# Patient Record
Sex: FEMALE | Race: WHITE | NOT HISPANIC OR LATINO | Employment: UNEMPLOYED | URBAN - METROPOLITAN AREA
[De-identification: names, ages, dates, MRNs, and addresses within clinical notes are randomized per-mention and may not be internally consistent; named-entity substitution may affect disease eponyms.]

---

## 2017-03-10 ENCOUNTER — TELEPHONE (OUTPATIENT)
Dept: GASTROENTEROLOGY | Facility: CLINIC | Age: 82
End: 2017-03-10

## 2017-05-02 ENCOUNTER — OFFICE VISIT (OUTPATIENT)
Dept: INTERNAL MEDICINE | Facility: CLINIC | Age: 82
End: 2017-05-02
Payer: MEDICARE

## 2017-05-02 ENCOUNTER — LAB VISIT (OUTPATIENT)
Dept: LAB | Facility: HOSPITAL | Age: 82
End: 2017-05-02
Attending: INTERNAL MEDICINE
Payer: MEDICARE

## 2017-05-02 VITALS
BODY MASS INDEX: 19.13 KG/M2 | HEIGHT: 59 IN | SYSTOLIC BLOOD PRESSURE: 98 MMHG | RESPIRATION RATE: 18 BRPM | WEIGHT: 94.88 LBS | DIASTOLIC BLOOD PRESSURE: 60 MMHG | HEART RATE: 68 BPM

## 2017-05-02 VITALS
DIASTOLIC BLOOD PRESSURE: 60 MMHG | BODY MASS INDEX: 18.95 KG/M2 | OXYGEN SATURATION: 98 % | WEIGHT: 94 LBS | HEIGHT: 59 IN | HEART RATE: 66 BPM | SYSTOLIC BLOOD PRESSURE: 96 MMHG

## 2017-05-02 DIAGNOSIS — E78.5 HYPERLIPIDEMIA, UNSPECIFIED HYPERLIPIDEMIA TYPE: ICD-10-CM

## 2017-05-02 DIAGNOSIS — M81.0 OSTEOPOROSIS, POST-MENOPAUSAL: ICD-10-CM

## 2017-05-02 DIAGNOSIS — F17.200 SMOKER: ICD-10-CM

## 2017-05-02 DIAGNOSIS — K22.4 ESOPHAGEAL DYSMOTILITY: Primary | ICD-10-CM

## 2017-05-02 DIAGNOSIS — K21.9 GASTROESOPHAGEAL REFLUX DISEASE WITHOUT ESOPHAGITIS: ICD-10-CM

## 2017-05-02 DIAGNOSIS — K22.4 ESOPHAGEAL DYSMOTILITY: ICD-10-CM

## 2017-05-02 DIAGNOSIS — S22.000S THORACIC COMPRESSION FRACTURE, SEQUELA: ICD-10-CM

## 2017-05-02 DIAGNOSIS — K59.03 CONSTIPATION DUE TO PAIN MEDICATION: ICD-10-CM

## 2017-05-02 DIAGNOSIS — Z00.00 ENCOUNTER FOR PREVENTIVE HEALTH EXAMINATION: Primary | ICD-10-CM

## 2017-05-02 LAB
ALBUMIN SERPL BCP-MCNC: 3.5 G/DL
ALP SERPL-CCNC: 74 U/L
ALT SERPL W/O P-5'-P-CCNC: 8 U/L
ANION GAP SERPL CALC-SCNC: 11 MMOL/L
AST SERPL-CCNC: 22 U/L
BASOPHILS # BLD AUTO: 0.02 K/UL
BASOPHILS NFR BLD: 0.2 %
BILIRUB SERPL-MCNC: 0.4 MG/DL
BUN SERPL-MCNC: 15 MG/DL
CALCIUM SERPL-MCNC: 9.5 MG/DL
CHLORIDE SERPL-SCNC: 96 MMOL/L
CHOLEST/HDLC SERPL: 2.9 {RATIO}
CO2 SERPL-SCNC: 32 MMOL/L
CREAT SERPL-MCNC: 1 MG/DL
DIFFERENTIAL METHOD: ABNORMAL
EOSINOPHIL # BLD AUTO: 0.2 K/UL
EOSINOPHIL NFR BLD: 2.2 %
ERYTHROCYTE [DISTWIDTH] IN BLOOD BY AUTOMATED COUNT: 14 %
EST. GFR  (AFRICAN AMERICAN): 57.7 ML/MIN/1.73 M^2
EST. GFR  (NON AFRICAN AMERICAN): 50.1 ML/MIN/1.73 M^2
GLUCOSE SERPL-MCNC: 97 MG/DL
HCT VFR BLD AUTO: 44.8 %
HDL/CHOLESTEROL RATIO: 35 %
HDLC SERPL-MCNC: 143 MG/DL
HDLC SERPL-MCNC: 50 MG/DL
HGB BLD-MCNC: 14.3 G/DL
LDLC SERPL CALC-MCNC: 65.6 MG/DL
LYMPHOCYTES # BLD AUTO: 1.9 K/UL
LYMPHOCYTES NFR BLD: 21.8 %
MCH RBC QN AUTO: 31.1 PG
MCHC RBC AUTO-ENTMCNC: 31.9 %
MCV RBC AUTO: 97 FL
MONOCYTES # BLD AUTO: 0.8 K/UL
MONOCYTES NFR BLD: 9.1 %
NEUTROPHILS # BLD AUTO: 5.7 K/UL
NEUTROPHILS NFR BLD: 66.5 %
NONHDLC SERPL-MCNC: 93 MG/DL
PLATELET # BLD AUTO: 253 K/UL
PMV BLD AUTO: 10.1 FL
POTASSIUM SERPL-SCNC: 3.6 MMOL/L
PROT SERPL-MCNC: 7.1 G/DL
RBC # BLD AUTO: 4.6 M/UL
SODIUM SERPL-SCNC: 139 MMOL/L
TRIGL SERPL-MCNC: 137 MG/DL
TSH SERPL DL<=0.005 MIU/L-ACNC: 1.95 UIU/ML
WBC # BLD AUTO: 8.57 K/UL

## 2017-05-02 PROCEDURE — 99999 PR PBB SHADOW E&M-EST. PATIENT-LVL III: CPT | Mod: PBBFAC,,, | Performed by: NURSE PRACTITIONER

## 2017-05-02 PROCEDURE — 99213 OFFICE O/P EST LOW 20 MIN: CPT | Mod: 27,PBBFAC | Performed by: INTERNAL MEDICINE

## 2017-05-02 PROCEDURE — 99999 PR PBB SHADOW E&M-EST. PATIENT-LVL III: CPT | Mod: 27,PBBFAC,, | Performed by: INTERNAL MEDICINE

## 2017-05-02 PROCEDURE — G0438 PPPS, INITIAL VISIT: HCPCS | Mod: ,,, | Performed by: NURSE PRACTITIONER

## 2017-05-02 PROCEDURE — 99214 OFFICE O/P EST MOD 30 MIN: CPT | Mod: S$PBB,,, | Performed by: INTERNAL MEDICINE

## 2017-05-02 RX ORDER — SIMVASTATIN 40 MG/1
TABLET, FILM COATED ORAL
Qty: 90 TABLET | Refills: 3 | Status: SHIPPED | OUTPATIENT
Start: 2017-05-02 | End: 2017-12-03

## 2017-05-02 RX ORDER — SIMVASTATIN 40 MG/1
TABLET, FILM COATED ORAL
Qty: 90 TABLET | Refills: 0 | Status: SHIPPED | OUTPATIENT
Start: 2017-05-02 | End: 2017-05-02 | Stop reason: SDUPTHER

## 2017-05-02 NOTE — PROGRESS NOTES
Subjective:       Patient ID: Simona Bassett is a 89 y.o. female.    Chief Complaint: Follow-up (Dysphagia, unspecified type )    HPI   C/o ache R shoulder.  She sleeps on that side.  Also with bruise on back where spine hits chair.  She spends a lot of time in room.    She smokes in bed in her room.    H/o dysphagia.  She says it's fine, but daughter thinks otherwise.  GI note reviewed.  Pt is eating less.  Esophogram never done.  Eating a few bites only.  Takes nexim twice a week.    Still smokes, most often in bed!.    Hyperlipidemia.  Due for refill simvastatin.    Constipation worse, but not eating much and not taking lactulose.    She has h/o hypokalemia, but not taking K regularly.    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  Had had R retinal injections.        Review of Systems   Constitutional: Negative for fever and unexpected weight change.   HENT: Negative for congestion and postnasal drip.    Eyes: Negative for pain, discharge and visual disturbance.   Respiratory: Negative for cough, chest tightness, shortness of breath and wheezing.    Cardiovascular: Negative for chest pain and leg swelling.   Gastrointestinal: Positive for constipation. Negative for abdominal pain, diarrhea and nausea.   Genitourinary: Negative for difficulty urinating, dysuria and hematuria.   Skin: Positive for rash.   Neurological: Negative for headaches.   Psychiatric/Behavioral: Negative for dysphoric mood and sleep disturbance. The patient is not nervous/anxious.        Objective:      Physical Exam   Constitutional: She is oriented to person, place, and time. She appears well-developed and well-nourished.   Eyes: No scleral icterus.   Neck: No JVD present. No thyromegaly present.   Cardiovascular: Normal rate, regular rhythm and normal heart sounds.    Pulmonary/Chest: Effort normal and breath sounds normal. No respiratory distress. She has no wheezes. She has no rales.   Abdominal: Soft. She  exhibits no mass. There is no tenderness.   Musculoskeletal: She exhibits no edema.   Neurological: She is alert and oriented to person, place, and time.   Skin:   Erythema, without breakdown, at mid spine protrusion.  Few abraded areas R prox post humerus.   Psychiatric: She has a normal mood and affect. Her behavior is normal.       Results for orders placed or performed in visit on 05/02/17   CBC auto differential   Result Value Ref Range    WBC 8.57 3.90 - 12.70 K/uL    RBC 4.60 4.00 - 5.40 M/uL    Hemoglobin 14.3 12.0 - 16.0 g/dL    Hematocrit 44.8 37.0 - 48.5 %    MCV 97 82 - 98 fL    MCH 31.1 (H) 27.0 - 31.0 pg    MCHC 31.9 (L) 32.0 - 36.0 %    RDW 14.0 11.5 - 14.5 %    Platelets 253 150 - 350 K/uL    MPV 10.1 9.2 - 12.9 fL    Gran # 5.7 1.8 - 7.7 K/uL    Lymph # 1.9 1.0 - 4.8 K/uL    Mono # 0.8 0.3 - 1.0 K/uL    Eos # 0.2 0.0 - 0.5 K/uL    Baso # 0.02 0.00 - 0.20 K/uL    Gran% 66.5 38.0 - 73.0 %    Lymph% 21.8 18.0 - 48.0 %    Mono% 9.1 4.0 - 15.0 %    Eosinophil% 2.2 0.0 - 8.0 %    Basophil% 0.2 0.0 - 1.9 %    Differential Method Automated      Assessment:       1. Esophageal dysmotility    2. Gastroesophageal reflux disease without esophagitis    3. Hyperlipidemia, unspecified hyperlipidemia type    4. Osteoporosis, post-menopausal    5. Smoker    6. Constipation due to pain medication        Plan:       Simona was seen today for follow-up.    Diagnoses and all orders for this visit:    Esophageal dysmotility    Gastroesophageal reflux disease without esophagitis    Hyperlipidemia, unspecified hyperlipidemia type    Osteoporosis, post-menopausal    Smoker    Constipation due to pain medication    Other orders  -     simvastatin (ZOCOR) 40 MG tablet; TAKE ONE TABLET BY MOUTH ONCE DAILY IN THE EVENING           Will notify Dr Cruz - esophogram needs to be r/s [phone] is daughter's #    Change position regularly.  Warned against smoking in bed!.  Daughter aware of risks and doing her best to ensure pt's  safety.

## 2017-05-02 NOTE — PROGRESS NOTES
"Simona Bassett presented for an initial Medicare AWV today.  Her daughter is present for today's visit. The following components were reviewed and updated:    · Medical history  · Family History  · Social history  · Allergies and Current Medications  · Health Risk Assessment  · Health Maintenance  · Care Team    **See Completed Assessments for Annual Wellness visit with in the encounter summary    The following assessments were completed:  · Depression Screening  · Cognitive function Screening      · Timed Get Up Test  · Whisper Test    Vitals:    05/02/17 0949   BP: 98/60   BP Location: Right arm   Patient Position: Sitting   BP Method: Manual   Pulse: 68   Resp: 18   Weight: 43 kg (94 lb 14.4 oz)   Height: 4' 11" (1.499 m)     Body mass index is 19.17 kg/(m^2).  Waist Measurements: 36 in]        Diagnoses and health risks identified today and associated recommendations/orders:  1. Encounter for preventive health examination      2. Hyperlipidemia, unspecified hyperlipidemia type  Stable and controlled. Continue current treatment plan as previously prescribed by PCP.        3. Gastroesophageal reflux disease without esophagitis  Stable and controlled. Continue current treatment plan as previously prescribed by PCP.       4. Esophageal dysmotility  Stable and controlled. Continue current treatment plan as previously prescribed by PCP.       5. Osteoporosis, post-menopausal  Stable and controlled. Continue current treatment plan as previously prescribed by PCP.       6. Thoracic compression fracture, sequela  Stable and controlled. Continue current treatment plan as previously prescribed by PCP.       7. Smoker  Discussed with patient the importance of smoking cessation. Literature provided. Declines participation in the smoking cessation program.      Provided Simona with a 5-10 year written screening schedule and personal prevention plan. Recommendations were developed using the USPSTF age appropriate " recommendations. Education, counseling, and referrals were provided as needed.  After Visit Summary printed and given to patient which includes a list of additional screenings\tests needed.    Return in about 6 months (around 11/2/2017) for Follow up with PCP, SOONER IF NEEDED, Follow up Annual Wellness Visit in 1 year.      JULISA SuarezC

## 2017-05-02 NOTE — MR AVS SNAPSHOT
[facility]  [address]  [facility] [address]  Phone: [phone]  Fax: [phone]                  Simona Bassett   2017 10:00 AM   Office Visit    Description:  Female : 10/1/1927   Provider:  SUSAN RICHARDS   Department:  [facility]           Reason for Visit     Medicare AWV           Diagnoses this Visit        Comments    Encounter for preventive health examination    -  Primary            To Do List           Future Appointments        Provider Department Dept Phone    2017 11:00 AM Twila Loera MD [facility] [phone]      Goals (5 Years of Data)     None      Follow-Up and Disposition     Return in about 6 months (around 2017) for Follow up with PCP, SOONER IF NEEDED, Follow up Annual Wellness Visit in 1 year.      [facility]     [facility] On Call Nurse Care Line -  Assistance  Unless otherwise directed by your provider, please contact [facility], our nurse care line that is available for  assistance.     Registered nurses in the [facility] On Call Center provide: appointment scheduling, clinical advisement, health education, and other advisory services.  Call: [phone] (toll free)               Medications           Message regarding Medications     Verify the changes and/or additions to your medication regime listed below are the same as discussed with your clinician today.  If any of these changes or additions are incorrect, please notify your healthcare provider.             Verify that the below list of medications is an accurate representation of the medications you are currently taking.  If none reported, the list may be blank. If incorrect, please contact your healthcare provider. Carry this list with you in case of emergency.           Current Medications     aspirin 81 mg Tab Every day    CALCIUM CARBONATE/VITAMIN D3 (CALCIUM 500 + D ORAL) Twice a day    esomeprazole (NEXIUM) 20 MG  "capsule Take 1 capsule (20 mg total) by mouth before breakfast.    mupirocin (BACTROBAN) 2 % ointment 1 Tube. AAA an needed    nitroGLYCERIN (NITROSTAT) 0.4 MG SL tablet Place 1 tablet (0.4 mg total) under the tongue every 5 (five) minutes as needed for Chest pain. Every day PRN    polymyxin B sulf-trimethoprim (POLYTRIM) 10,000 unit- 1 mg/mL Drop Place 1 drop into the left eye 3 (three) times daily.     potassium chloride SA (K-DUR,KLOR-CON) 20 MEQ tablet 1 tab po q day    simvastatin (ZOCOR) 40 MG tablet TAKE ONE TABLET BY MOUTH ONCE DAILY IN THE EVENING    tramadol (ULTRAM) 50 mg tablet Take 1 tablet (50 mg total) by mouth once daily.           Clinical Reference Information           Your Vitals Were     BP Pulse Resp Height Weight BMI    98/60 (BP Location: Right arm, Patient Position: Sitting, BP Method: Manual) 68 18 4' 11" (1.499 m) 43 kg (94 lb 14.4 oz) 19.17 kg/m2      Blood Pressure          Most Recent Value    BP  98/60      Allergies as of 5/2/2017     No Known Allergies      Immunizations Administered on Date of Encounter - 5/2/2017     None      [facility] Sign-Up     Activating your [facility] account is as easy as 1-2-3!     1) Visit [email or web address], select Sign Up Now, enter this activation code and your date of birth, then select Next.  [email or web address]  Expires: 6/16/2017 10:21 AM      2) Create a username and password to use when you visit [facility] in the future and select a security question in case you lose your password and select Next.    3) Enter your e-mail address and click Sign Up!    Additional Information  If you have questions, please e-mail [email or web address] or call [phone] to talk to our [facility] staff. Remember, [facility] is NOT to be used for urgent needs. For medical emergencies, dial 911.         Instructions      Counseling and Referral of Other Preventative  (Italic type indicates deductible and co-insurance are waived)    Patient Name: Simona Bassett  Today's Date: " 5/2/2017      SERVICE LIMITATIONS RECOMMENDATION    Vaccines    · Pneumococcal (once after 65)    · Influenza (annually)    · Hepatitis B (if medium/high risk)    · Prevnar 13  · TDAP      Hepatitis B medium/high risk factors:       - End-stage renal disease       - Hemophiliacs who received Factor VII or         IX concentrates       - Clients of institutions for the mentally             retarded       - Persons who live in the same house as          a HepB carrier       - Homosexual men       - Illicit injectable drug abusers     Pneumococcal: Done, no repeat necessary   11/02/2016     Influenza: Done, repeat in one year   11/02/2016     Hepatitis B: N/A     Prevnar 13: Done, no repeat necessary   05/07/2015  TDAP:03/12/2015    Mammogram (biennial age 50-74)  Annually (age 40 or over)  N/A    Pap (up to age 70 and after 70 if unknown history or abnormal study last 10 years)    N/A     The USPSTF recommends against screening for cervical cancer in women older than age 65 years who have had adequate prior screening and are not otherwise at high risk for cervical cancer.      Colorectal cancer screening (to age 75)    · Fecal occult blood test (annual)  · Flexible sigmoidoscopy (5y)  · Screening colonoscopy (10y)  · Barium enema   N/A     LAST DONE 06/05/2013; REPEAT AS CLINICALLY INDICATED     Diabetes self-management training (no USPSTF recommendations)  Requires referral by treating physician for patient with diabetes or renal disease. 10 hours of initial DSMT sessions of no less than 30 minutes each in a continuous 12-month period. 2 hours of follow-up DSMT in subsequent years.  N/A    Bone mass measurements (age 65 & older, biennial)  Requires diagnosis related to osteoporosis or estrogen deficiency. Biennial benefit unless patient has history of long-term glucocorticoid  Last done 07/08/2016, recommend to repeat every 2 years    Glaucoma screening (no USPSTF recommendation)  Diabetes mellitus, family history    , age 50 or over    American, age 65 or over  Last done 04/07/2017, recommend to repeat every 1  years    Medical nutrition therapy for diabetes or renal disease (no recommended schedule)  Requires referral by treating physician for patient with diabetes or renal disease or kidney transplant within the past 3 years.  Can be provided in same year as diabetes self-management training (DSMT), and CMS recommends medical nutrition therapy take place after DSMT. Up to 3 hours for initial year and 2 hours in subsequent years.  N/A    Cardiovascular screening blood tests (every 5 years)  · Fasting lipid panel  Order as a panel if possible  Last done 05/02/2017, recommend to repeat every 5  years    Diabetes screening tests (at least every 3 years, Medicare covers annually or at 6-month intervals for prediabetic patients)  · Fasting blood sugar (FBS) or glucose tolerance test (GTT)  Patient must be diagnosed with one of the following:       - Hypertension       - Dyslipidemia       - Obesity (BMI 30kg/m2)       - Previous elevated impaired FBS or GTT       ... or any two of the following:       - Overweight (BMI 25 but <30)       - Family history of diabetes       - Age 65 or older       - History of gestational diabetes or birth of baby weighing more than 9 pounds  Last done 05/02/2017, recommend to repeat every 3  years    Abdominal aortic aneurysm screening (once)  · Sonogram   Limited to patients who meet one of the following criteria:       - Men who are 65-75 years old and have smoked more than 100 cigarette in their lifetime       - Anyone with a family history of abdominal aortic aneurysm       - Anyone recommended for screening by the USPSTF  N/A    HIV screening (annually for increased risk patients)  · HIV-1 and HIV-2 by EIA, or JENNIFER, rapid antibody test or oral mucosa transudate  Patients must be at increased risk for HIV infection per USPSTF guidelines or pregnant. Tests covered annually  for patient at increased risk or as requested by the patient. Pregnant patients may receive up to 3 tests during pregnancy.  Risks discussed, screening is not recommended    Smoking cessation counseling (up to 8 sessions per year)  Patients must be asymptomatic of tobacco-related conditions to receive as a preventative service.  Declines    Subsequent annual wellness visit  At least 12 months since last AWV  Return in one year     The following information is provided to all patients.  This information is to help you find resources for any of the problems found today that may be affecting your health:                Living healthy guide: www.[facility].[place].[facility]      Understanding Diabetes: www.diabetes.org      Eating healthy: www.cdc.gov/healthyweight      [facility] home safety checklist: www.cdc.gov/steadi/patient.html      Agency on Aging: www.goea.[place].[facility]      Alcoholics anonymous (AA): www.aa.org      Physical Activity: www.marly.nih.gov/mg3tmhg      Tobacco use: www.quitwithusla.org     How to Quit Smoking  Smoking is one of the hardest habits to break. About half of all people who have ever smoked have been able to quit. Most people who still smoke want to quit. Here are some of the best ways to stop smoking.    Keep trying  It takes most smokers about eight tries before they can quit entirely. Its important not to give up.  Go cold turkey  Most former smokers quit cold turkey (all at once). Trying to cut back gradually doesn't seem to work as well, perhaps because it continues the smoking habit. Also, it is possible to inhale more while smoking fewer cigarettes. This results in the same amount of nicotine in your body!  Get support  Support programs can be a big help, especially for heavy smokers. These groups offer lectures, ways to change behavior, and peer support. Here are some ways to find a support program:  · Free national quitline: 800-QUIT-NOW (216-029-7008).  · Hospital quit-smoking  "programs.  · American Lung Association: (607.579.4862).  · American Cancer Society (247-968-9036).  Support at home is important too. Nonsmokers can offer praise and encouragement. If the smoker in your life finds it hard to quit, encourage them to keep trying!  Over-the-counter medicines  Nicotine replacement therapy may make quitting easier. Certain aids, such as the nicotine patch, gum, and lozenges, are available without a prescription. It is best to use these under a doctors care, though. The skin patch provides a steady supply of nicotine. Nicotine gum and lozenges give temporary bursts of low levels of nicotine. Both methods reduce the craving for cigarettes. Warning: If you have nausea, vomiting, dizziness, weakness, or a fast heartbeat, stop using these products and see your doctor.  Prescription medicines  After reviewing your smoking patterns and prior attempts to quit, your doctor may offer a prescription medicine such as bupropion, varenicline, a nicotine inhaler, or nasal spray. Each has advantages and side effects. Your doctor can review these with you.  Health benefits of quitting  The benefits of quitting start right away and keep improving the longer you go without smoking. These benefits occur at any age.  So whether you are 17 or 70, quitting is a good decision. Some of the benefits include:  · 20 minutes: Blood pressure and pulse return to normal.  · 8 hours: Oxygen levels return to normal.  · 2 days: Ability to smell and taste begin to improve as damaged nerves regrow.  · 2 to 3 weeks: Circulation and lung function improve.  · 1 to 9 months: Coughing, congestion, and shortness of breath decrease; tiredness decreases.  · 1 year: Risk of heart attack decreases by half.  · 5 years: Risk of lung cancer decreases by half; risk of stroke becomes the same as a nonsmokers.  For more on how to quit smoking, try these online resources:   · Smokefree.gov  · "Clearing the Air" booklet from the National " Cancer [place]: smokefree.gov/sites/default/files/pdf/clearing-the-air-accessible.pdf  Date Last Reviewed: 4/28/2015 © 2000-2016 [employer] [address] [place] [address], [place] [address]. All rights reserved. This information is not intended as a substitute for professional medical care. Always follow your healthcare professional's instructions.             Smoking Cessation     If you would like to quit smoking:   You may be eligible for free services if you are a [place] resident and started smoking cigarettes before September 1, 1988.  Call the Smoking Cessation Trust (SCT) toll free at (682) 482-4703 or (027) 719-0458.   Call 0-800-QUIT-NOW if you do not meet the above criteria.   Contact us via email: [email or web address]   View our website for more information: [email or web address]        Language Assistance Services     ATTENTION: Language assistance services are available, free of charge. Please call [phone].      ATENCIÓN: Si elliotla madina, tiene a cardenas disposición servicios gratuitos de asistencia lingüística. Llame al [phone].     CHÚ Ý: N?u b?n nói Ti?ng Vi?t, có các d?ch v? h? tr? ngôn ng? mi?n phí dành cho b?n. G?i s? [phone].         [facility] complies with applicable Federal civil rights laws and does not discriminate on the basis of race, color, national origin, age, disability, or sex.

## 2017-05-02 NOTE — Clinical Note
Nati - daughter is ready to schedule esophogram if you so desire.  Her # (Brandi) - [phone]  thanks  Twila

## 2017-05-02 NOTE — PATIENT INSTRUCTIONS
Counseling and Referral of Other Preventative  (Italic type indicates deductible and co-insurance are waived)    Patient Name: Simona Bassett  Today's Date: 5/2/2017      SERVICE LIMITATIONS RECOMMENDATION    Vaccines    · Pneumococcal (once after 65)    · Influenza (annually)    · Hepatitis B (if medium/high risk)    · Prevnar 13  · TDAP      Hepatitis B medium/high risk factors:       - End-stage renal disease       - Hemophiliacs who received Factor VII or         IX concentrates       - Clients of institutions for the mentally             retarded       - Persons who live in the same house as          a HepB carrier       - Homosexual men       - Illicit injectable drug abusers     Pneumococcal: Done, no repeat necessary   11/02/2016     Influenza: Done, repeat in one year   11/02/2016     Hepatitis B: N/A     Prevnar 13: Done, no repeat necessary   05/07/2015  TDAP:03/12/2015    Mammogram (biennial age 50-74)  Annually (age 40 or over)  N/A    Pap (up to age 70 and after 70 if unknown history or abnormal study last 10 years)    N/A     The USPSTF recommends against screening for cervical cancer in women older than age 65 years who have had adequate prior screening and are not otherwise at high risk for cervical cancer.      Colorectal cancer screening (to age 75)    · Fecal occult blood test (annual)  · Flexible sigmoidoscopy (5y)  · Screening colonoscopy (10y)  · Barium enema   N/A     LAST DONE 06/05/2013; REPEAT AS CLINICALLY INDICATED     Diabetes self-management training (no USPSTF recommendations)  Requires referral by treating physician for patient with diabetes or renal disease. 10 hours of initial DSMT sessions of no less than 30 minutes each in a continuous 12-month period. 2 hours of follow-up DSMT in subsequent years.  N/A    Bone mass measurements (age 65 & older, biennial)  Requires diagnosis related to osteoporosis or estrogen deficiency. Biennial benefit unless patient has history of  long-term glucocorticoid  Last done 07/08/2016, recommend to repeat every 2 years    Glaucoma screening (no USPSTF recommendation)  Diabetes mellitus, family history   , age 50 or over    American, age 65 or over  Last done 04/07/2017, recommend to repeat every 1  years    Medical nutrition therapy for diabetes or renal disease (no recommended schedule)  Requires referral by treating physician for patient with diabetes or renal disease or kidney transplant within the past 3 years.  Can be provided in same year as diabetes self-management training (DSMT), and CMS recommends medical nutrition therapy take place after DSMT. Up to 3 hours for initial year and 2 hours in subsequent years.  N/A    Cardiovascular screening blood tests (every 5 years)  · Fasting lipid panel  Order as a panel if possible  Last done 05/02/2017, recommend to repeat every 5  years    Diabetes screening tests (at least every 3 years, Medicare covers annually or at 6-month intervals for prediabetic patients)  · Fasting blood sugar (FBS) or glucose tolerance test (GTT)  Patient must be diagnosed with one of the following:       - Hypertension       - Dyslipidemia       - Obesity (BMI 30kg/m2)       - Previous elevated impaired FBS or GTT       ... or any two of the following:       - Overweight (BMI 25 but <30)       - Family history of diabetes       - Age 65 or older       - History of gestational diabetes or birth of baby weighing more than 9 pounds  Last done 05/02/2017, recommend to repeat every 3  years    Abdominal aortic aneurysm screening (once)  · Sonogram   Limited to patients who meet one of the following criteria:       - Men who are 65-75 years old and have smoked more than 100 cigarette in their lifetime       - Anyone with a family history of abdominal aortic aneurysm       - Anyone recommended for screening by the USPSTF  N/A    HIV screening (annually for increased risk patients)  · HIV-1 and HIV-2 by EIA,  or JENNIFER, rapid antibody test or oral mucosa transudate  Patients must be at increased risk for HIV infection per USPSTF guidelines or pregnant. Tests covered annually for patient at increased risk or as requested by the patient. Pregnant patients may receive up to 3 tests during pregnancy.  Risks discussed, screening is not recommended    Smoking cessation counseling (up to 8 sessions per year)  Patients must be asymptomatic of tobacco-related conditions to receive as a preventative service.  Declines    Subsequent annual wellness visit  At least 12 months since last AWV  Return in one year     The following information is provided to all patients.  This information is to help you find resources for any of the problems found today that may be affecting your health:                Living healthy guide: www.[facility].[place].[facility]      Understanding Diabetes: www.diabetes.org      Eating healthy: www.cdc.gov/healthyweight      [facility] home safety checklist: www.cdc.gov/steadi/patient.html      Agency on Aging: www.goea.[place].[facility]      Alcoholics anonymous (AA): www.aa.org      Physical Activity: www.marly.nih.gov/iu1pwwe      Tobacco use: www.quitwithusla.org     How to Quit Smoking  Smoking is one of the hardest habits to break. About half of all people who have ever smoked have been able to quit. Most people who still smoke want to quit. Here are some of the best ways to stop smoking.    Keep trying  It takes most smokers about eight tries before they can quit entirely. Its important not to give up.  Go cold turkey  Most former smokers quit cold turkey (all at once). Trying to cut back gradually doesn't seem to work as well, perhaps because it continues the smoking habit. Also, it is possible to inhale more while smoking fewer cigarettes. This results in the same amount of nicotine in your body!  Get support  Support programs can be a big help, especially for heavy smokers. These groups offer lectures, ways to change  behavior, and peer support. Here are some ways to find a support program:  · Free national quitline: 800-QUIT-NOW (226-122-5074).  · Hospital quit-smoking programs.  · American Lung Association: (939.405.5663).  · American Cancer Society (848-759-2454).  Support at home is important too. Nonsmokers can offer praise and encouragement. If the smoker in your life finds it hard to quit, encourage them to keep trying!  Over-the-counter medicines  Nicotine replacement therapy may make quitting easier. Certain aids, such as the nicotine patch, gum, and lozenges, are available without a prescription. It is best to use these under a doctors care, though. The skin patch provides a steady supply of nicotine. Nicotine gum and lozenges give temporary bursts of low levels of nicotine. Both methods reduce the craving for cigarettes. Warning: If you have nausea, vomiting, dizziness, weakness, or a fast heartbeat, stop using these products and see your doctor.  Prescription medicines  After reviewing your smoking patterns and prior attempts to quit, your doctor may offer a prescription medicine such as bupropion, varenicline, a nicotine inhaler, or nasal spray. Each has advantages and side effects. Your doctor can review these with you.  Health benefits of quitting  The benefits of quitting start right away and keep improving the longer you go without smoking. These benefits occur at any age.  So whether you are 17 or 70, quitting is a good decision. Some of the benefits include:  · 20 minutes: Blood pressure and pulse return to normal.  · 8 hours: Oxygen levels return to normal.  · 2 days: Ability to smell and taste begin to improve as damaged nerves regrow.  · 2 to 3 weeks: Circulation and lung function improve.  · 1 to 9 months: Coughing, congestion, and shortness of breath decrease; tiredness decreases.  · 1 year: Risk of heart attack decreases by half.  · 5 years: Risk of lung cancer decreases by half; risk of stroke becomes  "the same as a nonsmokers.  For more on how to quit smoking, try these online resources:   · Smokefree.gov  · "Clearing the Air" booklet from the National Cancer [employer]: smokefree.gov/sites/default/files/pdf/clearing-the-air-accessible.pdf  Date Last Reviewed: 4/28/2015 © 2000-2016 [employer]. [address] [place] [address]. All rights reserved. This information is not intended as a substitute for professional medical care. Always follow your healthcare professional's instructions.        "

## 2017-05-03 ENCOUNTER — TELEPHONE (OUTPATIENT)
Dept: GASTROENTEROLOGY | Facility: CLINIC | Age: 82
End: 2017-05-03

## 2017-05-03 NOTE — TELEPHONE ENCOUNTER
Per Dr. Loera daughter is ready to schedule esophogram.  Her # (Brandi) - [phone]      Pls contact pt and daughter and confirm that pt desires to proceed w esophagogram. pls arrange if pt agrees

## 2017-05-06 ENCOUNTER — TELEPHONE (OUTPATIENT)
Dept: GASTROENTEROLOGY | Facility: CLINIC | Age: 82
End: 2017-05-06

## 2017-05-06 DIAGNOSIS — R13.10 DYSPHAGIA, UNSPECIFIED TYPE: Primary | ICD-10-CM

## 2017-05-07 NOTE — TELEPHONE ENCOUNTER
Per Dr. Loera daughter is ready to schedule esophogram.  Her # (Brandi) - [phone]      Pls contact pt and daughter and confirm that pt desires to proceed w esophagogram. pls arrange if pt agrees

## 2017-05-08 NOTE — TELEPHONE ENCOUNTER
Spoke to Ms. Paz.  Esophagram scheduled.  Instructions and directions given.    She verbalizes understanding and appt slip mailed.

## 2017-05-18 ENCOUNTER — TELEPHONE (OUTPATIENT)
Dept: RADIOLOGY | Facility: HOSPITAL | Age: 82
End: 2017-05-18

## 2017-05-19 ENCOUNTER — HOSPITAL ENCOUNTER (OUTPATIENT)
Dept: RADIOLOGY | Facility: HOSPITAL | Age: 82
Discharge: HOME | End: 2017-05-19
Attending: INTERNAL MEDICINE
Payer: MEDICARE

## 2017-05-19 DIAGNOSIS — R13.10 DYSPHAGIA, UNSPECIFIED TYPE: ICD-10-CM

## 2017-05-19 PROCEDURE — 74220 X-RAY XM ESOPHAGUS 1CNTRST: CPT | Mod: TC

## 2017-05-19 PROCEDURE — 74220 X-RAY XM ESOPHAGUS 1CNTRST: CPT | Mod: 26,GC,, | Performed by: RADIOLOGY

## 2017-05-25 ENCOUNTER — TELEPHONE (OUTPATIENT)
Dept: GASTROENTEROLOGY | Facility: CLINIC | Age: 82
End: 2017-05-25

## 2017-05-25 NOTE — TELEPHONE ENCOUNTER
----- Message from Nati Cruz MD sent at 5/19/2017 11:25 PM CDT -----  Please let patient's daughter Gricelda Shanon Kelly at [phone]  know that esophagogram shows moderate dysmotility w tertiary contractions but no other significant abnormalities.  I released the result via [facility]. Unlikely that EGD w dilation would make much of a difference     The esophagus is normal in contour and caliber without evidence of masses or strictures. There are moderate tertiary contractions. No gastroesophageal reflux occurred during this exam.  No hiatal hernia demonstrated.  The gastric cardia is unremarkable in appearance.

## 2017-06-19 RX ORDER — TRAMADOL HYDROCHLORIDE 50 MG/1
50 TABLET ORAL DAILY
Qty: 90 TABLET | Refills: 0 | Status: ON HOLD | OUTPATIENT
Start: 2017-06-19 | End: 2017-10-30

## 2017-06-19 NOTE — TELEPHONE ENCOUNTER
----- Message from José Miguel Rutledge sent at 6/19/2017 12:31 PM CDT -----  Contact: Brandi/ [phone] cell  Type: Rx    Name of medication(s): tramadol (ULTRAM) 50 mg tablet    Is this a refill? New rx? refill    Who prescribed medication? Dr. Loera    Pharmacy Name, Phone, & Location: [facility] on file    Comments: Pt's daughter would like to request a refill on the medication above.  Please call and advise.    Thank you

## 2017-09-19 ENCOUNTER — TELEPHONE (OUTPATIENT)
Dept: INTERNAL MEDICINE | Facility: CLINIC | Age: 82
End: 2017-09-19

## 2017-09-19 NOTE — TELEPHONE ENCOUNTER
----- Message from Deandra Link sent at 9/18/2017  1:19 PM CDT -----  Contact: DaughterBrandi  Mobile: [phone]   Requesting a form for renewal for handicap sticker. Please mail it.    [address] [place] [address] JANET HERNANDEZ [id number]

## 2017-10-09 ENCOUNTER — TELEPHONE (OUTPATIENT)
Dept: INTERNAL MEDICINE | Facility: CLINIC | Age: 82
End: 2017-10-09

## 2017-10-09 DIAGNOSIS — S92.909S CLOSED FRACTURE OF FOOT, UNSPECIFIED LATERALITY, SEQUELA: Primary | ICD-10-CM

## 2017-10-09 NOTE — TELEPHONE ENCOUNTER
----- Message from Laura Carmona sent at 10/9/2017 10:03 AM CDT -----  Contact: pt [phone]  Patient would like to get a referral.  Does the patient already have the specialty clinic appointment scheduled:  no  If yes, what date is the appointment scheduled:   n/a  Referral to what specialty:  orthopedic  Reason (be specific):  Fracture in left foot  Does the patient want the referral with a specific physician:  no  Is this an [facility] or [facility] physician:  no  Comments:

## 2017-10-13 ENCOUNTER — OFFICE VISIT (OUTPATIENT)
Dept: ORTHOPEDICS | Facility: CLINIC | Age: 82
End: 2017-10-13
Payer: MEDICARE

## 2017-10-13 ENCOUNTER — HOSPITAL ENCOUNTER (OUTPATIENT)
Dept: RADIOLOGY | Facility: HOSPITAL | Age: 82
Discharge: HOME | End: 2017-10-13
Attending: ORTHOPAEDIC SURGERY
Payer: MEDICARE

## 2017-10-13 VITALS — BODY MASS INDEX: 18.95 KG/M2 | WEIGHT: 94 LBS | HEIGHT: 59 IN

## 2017-10-13 DIAGNOSIS — M79.672 LEFT FOOT PAIN: ICD-10-CM

## 2017-10-13 DIAGNOSIS — S92.355A CLOSED NONDISPLACED FRACTURE OF FIFTH METATARSAL BONE OF LEFT FOOT, INITIAL ENCOUNTER: ICD-10-CM

## 2017-10-13 DIAGNOSIS — M79.672 LEFT FOOT PAIN: Primary | ICD-10-CM

## 2017-10-13 PROCEDURE — 99203 OFFICE O/P NEW LOW 30 MIN: CPT | Mod: S$PBB,,, | Performed by: ORTHOPAEDIC SURGERY

## 2017-10-13 PROCEDURE — 99212 OFFICE O/P EST SF 10 MIN: CPT | Mod: PBBFAC,25 | Performed by: ORTHOPAEDIC SURGERY

## 2017-10-13 PROCEDURE — 99999 PR PBB SHADOW E&M-EST. PATIENT-LVL II: CPT | Mod: PBBFAC,,, | Performed by: ORTHOPAEDIC SURGERY

## 2017-10-13 PROCEDURE — 73630 X-RAY EXAM OF FOOT: CPT | Mod: TC,LT

## 2017-10-13 PROCEDURE — 73630 X-RAY EXAM OF FOOT: CPT | Mod: 26,LT,, | Performed by: RADIOLOGY

## 2017-10-13 RX ORDER — LACTULOSE 10 G/15ML
SOLUTION ORAL; RECTAL
Status: ON HOLD | COMMUNITY
Start: 2017-10-09 | End: 2017-10-30 | Stop reason: HOSPADM

## 2017-10-13 NOTE — LETTER
October 13, 2017      Twila Loera MD  [address]  [facility] [address]           [facility]  [address]  [facility] [address]  Phone: [phone]          Patient: Simona Bassett   MR Number: [id number]   YOB: 1927   Date of Visit: 10/13/2017       Dear Dr. Twila Loera:    Thank you for referring Simona Bassett to me for evaluation. Attached you will find relevant portions of my assessment and plan of care.    If you have questions, please do not hesitate to call me. I look forward to following Simona Bassett along with you.    Sincerely,    Brijesh Treviño MD    Enclosure  CC:  No Recipients    If you would like to receive this communication electronically, please contact [email or web address] or [phone] to request more information on [employer] [facility] access.    For providers and/or their staff who would like to refer a patient to [facility], please contact us through our one-stop-shop provider referral line, [facility], at [phone].    If you feel you have received this communication in error or would no longer like to receive these types of communications, please e-mail [email or web address]

## 2017-10-13 NOTE — PROGRESS NOTES
CC: left foot fracture    HPI: Simona Bassett is a 89 y.o. female who fell on 10/8/17 when she got up in the middle of the night and sustained a injury to her left foot. The pain is sharp in nature increased with any activity and improved with immobilization. She was seen at an urgent care which told her she had a mild stress frx. She denies and pain anywhere else, fever, or chills.       PAST MEDICAL HISTORY:   Past Medical History:   Diagnosis Date    Arthritis     Cataract     Dysphagia, unspecified(787.20)     Glaucoma     Hyperlipidemia     MGUS (monoclonal gammopathy of unknown significance)     Osteoporosis      PAST SURGICAL HISTORY:   Past Surgical History:   Procedure Laterality Date    BACK SURGERY      CHOLECYSTECTOMY      EYE SURGERY Bilateral     CATARACT SURGERY    GALLBLADDER SURGERY      SPINE SURGERY       FAMILY HISTORY:   Family History   Problem Relation Age of Onset    Stroke Mother     Heart disease Father     Cancer Sister     Cancer Brother      ? prostate    No Known Problems Daughter     No Known Problems Son     No Known Problems Son     No Known Problems Son     No Known Problems Son     No Known Problems Daughter     Colon cancer Neg Hx     Cystic fibrosis Neg Hx     Esophageal cancer Neg Hx     Hemochromatosis Neg Hx     Inflammatory bowel disease Neg Hx     Liver cancer Neg Hx     Liver disease Neg Hx     Rectal cancer Neg Hx     Stomach cancer Neg Hx     Derrick's disease Neg Hx     Celiac disease Neg Hx      SOCIAL HISTORY:   Social History     Social History    Marital status:      Spouse name: N/A    Number of children: 6    Years of education: N/A     Occupational History    Not on file.     Social History Main Topics    Smoking status: Current Every Day Smoker     Packs/day: 1.00     Years: 60.00    Smokeless tobacco: Never Used    Alcohol use No    Drug use: No    Sexual activity: Not on file     Other Topics Concern    Not on  "file     Social History Narrative    No narrative on file       MEDICATIONS:   Current Outpatient Prescriptions:     aspirin 81 mg Tab, Every day, Disp: , Rfl:     CALCIUM CARBONATE/VITAMIN D3 (CALCIUM 500 + D ORAL), Twice a day, Disp: , Rfl:     lactulose (CHRONULAC) 10 gram/15 mL solution, as needed. , Disp: , Rfl:     mupirocin (BACTROBAN) 2 % ointment, 1 Tube. AAA an needed, Disp: , Rfl:     nitroGLYCERIN (NITROSTAT) 0.4 MG SL tablet, Place 1 tablet (0.4 mg total) under the tongue every 5 (five) minutes as needed for Chest pain. Every day PRN, Disp: 100 tablet, Rfl: 5    polymyxin B sulf-trimethoprim (POLYTRIM) 10,000 unit- 1 mg/mL Drop, Place 1 drop into the left eye 3 (three) times daily. , Disp: , Rfl:     simvastatin (ZOCOR) 40 MG tablet, TAKE ONE TABLET BY MOUTH ONCE DAILY IN THE EVENING, Disp: 90 tablet, Rfl: 3    tramadol (ULTRAM) 50 mg tablet, Take 1 tablet (50 mg total) by mouth once daily., Disp: 90 tablet, Rfl: 0    VIT A/VIT C/VIT E/ZINC/COPPER (PRESERVISION AREDS ORAL), Take by mouth., Disp: , Rfl:     esomeprazole (NEXIUM) 20 MG capsule, Take 1 capsule (20 mg total) by mouth before breakfast. (Patient taking differently: Take 20 mg by mouth before breakfast. Daily as needed), Disp: 90 capsule, Rfl: 3    potassium chloride SA (K-DUR,KLOR-CON) 20 MEQ tablet, 1 tab po q day, Disp: 90 tablet, Rfl: 3  ALLERGIES: Review of patient's allergies indicates:  No Known Allergies    VITAL SIGNS: Ht 4' 11" (1.499 m)      Review of Systems   Constitution: Negative. Negative for chills, fever and night sweats.   HENT: Negative for congestion and headaches.    Eyes: Negative for blurred vision, left vision loss and right vision loss.   Cardiovascular: Negative for chest pain and syncope.   Respiratory: Negative for cough and shortness of breath.     Hematologic/Lymphatic: Negative for bleeding problem. Does not bruise/bleed easily.   Skin: Negative for dry skin, itching and rash.   Musculoskeletal: " Negative for falls and muscle weakness.   Neurological: Negative for disturbances in coordination, loss of balance and seizures.    Allergic/Immunologic: Negative for hives and persistent infections.       Physical Exam   Constitutional: Oriented to person, place, and time. Appears well-developed and well-nourished.   Head: Normocephalic and atraumatic.   Nose: Nose normal.   Eyes: No scleral icterus.   Neck: Normal range of motion. Neck supple.   Cardiovascular: Normal rate, rythm  Pulm: Breath sounds clear. No audible wheezing   Pulses:DP are 2+ on the right side, and 2+ on the left side.   Neurological: Alert and oriented to person, place, and time.   Skin: Skin is warm.   Psychiatric: Normal mood and affect.   Musc: Some ecchymosis and edema over the lateral aspect of the left foot with TTP over the 5th metatarsal. Otherwise NVI.    Imaging:  Xray of the left foot showing a non displaced 5th metatarsal frx    Assessment/Plan  Simona Bassett is a 89 y.o. female presents with a nondisplaced 5th metatarsal fracture. We will trx with a hard sole shoe and WBAT. Fu in 6 weeks.      I have personally taken the history and examined this patient and agree with the residents note as stated above.

## 2017-10-25 ENCOUNTER — ANESTHESIA EVENT (OUTPATIENT)
Dept: SURGERY | Facility: HOSPITAL | Age: 82
DRG: 481 | End: 2017-10-25
Payer: MEDICARE

## 2017-10-25 ENCOUNTER — HOSPITAL ENCOUNTER (INPATIENT)
Facility: HOSPITAL | Age: 82
LOS: 5 days | Discharge: NURSING, REHAB OR LONG-TERM CARE | DRG: 481 | End: 2017-10-30
Attending: EMERGENCY MEDICINE | Admitting: HOSPITALIST
Payer: MEDICARE

## 2017-10-25 DIAGNOSIS — S72.142D CLOSED DISPLACED INTERTROCHANTERIC FRACTURE OF LEFT FEMUR WITH ROUTINE HEALING: Primary | ICD-10-CM

## 2017-10-25 DIAGNOSIS — E83.39 HYPOPHOSPHATEMIA: ICD-10-CM

## 2017-10-25 DIAGNOSIS — K21.9 GASTROESOPHAGEAL REFLUX DISEASE WITHOUT ESOPHAGITIS: ICD-10-CM

## 2017-10-25 DIAGNOSIS — S72.142D CLOSED DISPLACED INTERTROCHANTERIC FRACTURE OF LEFT FEMUR WITH ROUTINE HEALING: ICD-10-CM

## 2017-10-25 DIAGNOSIS — R52 PAIN: ICD-10-CM
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
## 2017-10-25 DIAGNOSIS — Z72.0 TOBACCO ABUSE: ICD-10-CM

## 2017-10-25 DIAGNOSIS — D62 ACUTE BLOOD LOSS AS CAUSE OF POSTOPERATIVE ANEMIA: ICD-10-CM

## 2017-10-25 DIAGNOSIS — E78.2 MIXED HYPERLIPIDEMIA: ICD-10-CM

## 2017-10-25 DIAGNOSIS — S72.002D ENCOUNTER FOR AFTERCARE FOR HEALING CLOSED TRAUMATIC FRACTURE OF LEFT HIP: ICD-10-CM

## 2017-10-25 DIAGNOSIS — Z01.810 PREOP CARDIOVASCULAR EXAM: ICD-10-CM
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
## 2017-10-25 DIAGNOSIS — N30.01 ACUTE CYSTITIS WITH HEMATURIA: ICD-10-CM

## 2017-10-25 DIAGNOSIS — S72.142A CLOSED DISPLACED INTERTROCHANTERIC FRACTURE OF LEFT FEMUR: ICD-10-CM

## 2017-10-25 DIAGNOSIS — S72.142A CLOSED DISPLACED INTERTROCHANTERIC FRACTURE OF LEFT FEMUR, INITIAL ENCOUNTER: ICD-10-CM

## 2017-10-25 DIAGNOSIS — M81.0 OSTEOPOROSIS, POST-MENOPAUSAL: ICD-10-CM

## 2017-10-25 DIAGNOSIS — S72.002A CLOSED FRACTURE OF LEFT HIP, INITIAL ENCOUNTER: ICD-10-CM

## 2017-10-25 DIAGNOSIS — R63.0 POOR APPETITE: ICD-10-CM

## 2017-10-25 DIAGNOSIS — K59.03 CONSTIPATION DUE TO PAIN MEDICATION: ICD-10-CM

## 2017-10-25 PROBLEM — S92.353A CLOSED FRACTURE OF BASE OF FIFTH METATARSAL BONE: Status: ACTIVE | Noted: 2017-10-25

## 2017-10-25 LAB
AMORPH CRY UR QL COMP ASSIST: ABNORMAL
ANION GAP SERPL CALC-SCNC: 9 MMOL/L
BASOPHILS # BLD AUTO: 0.03 K/UL
BASOPHILS NFR BLD: 0.4 %
BILIRUB UR QL STRIP: NEGATIVE
BUN SERPL-MCNC: 19 MG/DL
CALCIUM SERPL-MCNC: 10.2 MG/DL
CHLORIDE SERPL-SCNC: 95 MMOL/L
CLARITY UR REFRACT.AUTO: CLEAR
CO2 SERPL-SCNC: 34 MMOL/L
COLOR UR AUTO: YELLOW
CREAT SERPL-MCNC: 0.8 MG/DL
DIFFERENTIAL METHOD: ABNORMAL
EOSINOPHIL # BLD AUTO: 0.5 K/UL
EOSINOPHIL NFR BLD: 5.4 %
ERYTHROCYTE [DISTWIDTH] IN BLOOD BY AUTOMATED COUNT: 13.8 %
EST. GFR  (AFRICAN AMERICAN): >60 ML/MIN/1.73 M^2
EST. GFR  (NON AFRICAN AMERICAN): >60 ML/MIN/1.73 M^2
GLUCOSE SERPL-MCNC: 66 MG/DL
GLUCOSE UR QL STRIP: NEGATIVE
HCT VFR BLD AUTO: 39 %
HGB BLD-MCNC: 12.3 G/DL
HGB UR QL STRIP: ABNORMAL
IMM GRANULOCYTES # BLD AUTO: 0.02 K/UL
IMM GRANULOCYTES NFR BLD AUTO: 0.2 %
INR PPP: 0.9
KETONES UR QL STRIP: NEGATIVE
LEUKOCYTE ESTERASE UR QL STRIP: ABNORMAL
LYMPHOCYTES # BLD AUTO: 1.9 K/UL
LYMPHOCYTES NFR BLD: 22.4 %
MCH RBC QN AUTO: 30.3 PG
MCHC RBC AUTO-ENTMCNC: 31.5 G/DL
MCV RBC AUTO: 96 FL
MICROSCOPIC COMMENT: ABNORMAL
MONOCYTES # BLD AUTO: 0.9 K/UL
MONOCYTES NFR BLD: 10.3 %
NEUTROPHILS # BLD AUTO: 5.2 K/UL
NEUTROPHILS NFR BLD: 61.3 %
NITRITE UR QL STRIP: NEGATIVE
NRBC BLD-RTO: 0 /100 WBC
PH UR STRIP: 7 [PH] (ref 5–8)
PLATELET # BLD AUTO: 321 K/UL
PMV BLD AUTO: 9.7 FL
POCT GLUCOSE: 123 MG/DL (ref 70–110)
POTASSIUM SERPL-SCNC: 3.5 MMOL/L
PROT UR QL STRIP: NEGATIVE
PROTHROMBIN TIME: 9.6 SEC
RBC # BLD AUTO: 4.06 M/UL
RBC #/AREA URNS AUTO: 14 /HPF (ref 0–4)
SODIUM SERPL-SCNC: 138 MMOL/L
SP GR UR STRIP: 1.01 (ref 1–1.03)
URN SPEC COLLECT METH UR: ABNORMAL
UROBILINOGEN UR STRIP-ACNC: NEGATIVE EU/DL
WBC # BLD AUTO: 8.47 K/UL
WBC #/AREA URNS AUTO: 7 /HPF (ref 0–5)

## 2017-10-25 PROCEDURE — 99285 EMERGENCY DEPT VISIT HI MDM: CPT | Mod: 25

## 2017-10-25 PROCEDURE — 51702 INSERT TEMP BLADDER CATH: CPT

## 2017-10-25 PROCEDURE — 85610 PROTHROMBIN TIME: CPT

## 2017-10-25 PROCEDURE — 93005 ELECTROCARDIOGRAM TRACING: CPT

## 2017-10-25 PROCEDURE — 96374 THER/PROPH/DIAG INJ IV PUSH: CPT

## 2017-10-25 PROCEDURE — 80048 BASIC METABOLIC PNL TOTAL CA: CPT

## 2017-10-25 PROCEDURE — 99285 EMERGENCY DEPT VISIT HI MDM: CPT | Mod: ,,, | Performed by: INTERNAL MEDICINE

## 2017-10-25 PROCEDURE — 63600175 PHARM REV CODE 636 W HCPCS: Performed by: EMERGENCY MEDICINE

## 2017-10-25 PROCEDURE — 63600175 PHARM REV CODE 636 W HCPCS: Performed by: HOSPITALIST

## 2017-10-25 PROCEDURE — 93010 ELECTROCARDIOGRAM REPORT: CPT | Mod: ,,, | Performed by: INTERNAL MEDICINE

## 2017-10-25 PROCEDURE — 11000001 HC ACUTE MED/SURG PRIVATE ROOM

## 2017-10-25 PROCEDURE — 63600175 PHARM REV CODE 636 W HCPCS: Performed by: STUDENT IN AN ORGANIZED HEALTH CARE EDUCATION/TRAINING PROGRAM

## 2017-10-25 PROCEDURE — 25000003 PHARM REV CODE 250: Performed by: HOSPITALIST

## 2017-10-25 PROCEDURE — 81001 URINALYSIS AUTO W/SCOPE: CPT

## 2017-10-25 PROCEDURE — 85025 COMPLETE CBC W/AUTO DIFF WBC: CPT

## 2017-10-25 PROCEDURE — 99223 1ST HOSP IP/OBS HIGH 75: CPT | Mod: ,,, | Performed by: HOSPITALIST

## 2017-10-25 RX ORDER — HEPARIN SODIUM 5000 [USP'U]/ML
5000 INJECTION, SOLUTION INTRAVENOUS; SUBCUTANEOUS EVERY 8 HOURS
Status: DISCONTINUED | OUTPATIENT
Start: 2017-10-25 | End: 2017-10-25

## 2017-10-25 RX ORDER — IBUPROFEN 600 MG/1
600 TABLET ORAL EVERY 6 HOURS PRN
Status: DISCONTINUED | OUTPATIENT
Start: 2017-10-25 | End: 2017-10-28

## 2017-10-25 RX ORDER — POLYETHYLENE GLYCOL 3350 17 G/17G
17 POWDER, FOR SOLUTION ORAL DAILY
Status: DISCONTINUED | OUTPATIENT
Start: 2017-10-26 | End: 2017-10-26

## 2017-10-25 RX ORDER — POLYMYXIN B SULFATE AND TRIMETHOPRIM 1; 10000 MG/ML; [USP'U]/ML
1 SOLUTION OPHTHALMIC 3 TIMES DAILY
Status: DISCONTINUED | OUTPATIENT
Start: 2017-10-25 | End: 2017-10-30 | Stop reason: HOSPADM

## 2017-10-25 RX ORDER — HYDROMORPHONE HYDROCHLORIDE 1 MG/ML
1 INJECTION, SOLUTION INTRAMUSCULAR; INTRAVENOUS; SUBCUTANEOUS EVERY 4 HOURS PRN
Status: DISCONTINUED | OUTPATIENT
Start: 2017-10-25 | End: 2017-10-26

## 2017-10-25 RX ORDER — IBUPROFEN 200 MG
24 TABLET ORAL
Status: DISCONTINUED | OUTPATIENT
Start: 2017-10-25 | End: 2017-10-30 | Stop reason: HOSPADM

## 2017-10-25 RX ORDER — FERROUS SULFATE, DRIED 160(50) MG
1 TABLET, EXTENDED RELEASE ORAL 2 TIMES DAILY
Status: DISCONTINUED | OUTPATIENT
Start: 2017-10-25 | End: 2017-10-30 | Stop reason: HOSPADM

## 2017-10-25 RX ORDER — ONDANSETRON 8 MG/1
8 TABLET, ORALLY DISINTEGRATING ORAL EVERY 8 HOURS PRN
Status: DISCONTINUED | OUTPATIENT
Start: 2017-10-25 | End: 2017-10-28

## 2017-10-25 RX ORDER — RAMELTEON 8 MG/1
8 TABLET ORAL NIGHTLY PRN
Status: DISCONTINUED | OUTPATIENT
Start: 2017-10-25 | End: 2017-10-26

## 2017-10-25 RX ORDER — HYDROMORPHONE HYDROCHLORIDE 1 MG/ML
0.5 INJECTION, SOLUTION INTRAMUSCULAR; INTRAVENOUS; SUBCUTANEOUS ONCE
Status: COMPLETED | OUTPATIENT
Start: 2017-10-25 | End: 2017-10-25

## 2017-10-25 RX ORDER — ACETAMINOPHEN 325 MG/1
650 TABLET ORAL EVERY 8 HOURS PRN
Status: DISCONTINUED | OUTPATIENT
Start: 2017-10-25 | End: 2017-10-28

## 2017-10-25 RX ORDER — GLUCAGON 1 MG
1 KIT INJECTION
Status: DISCONTINUED | OUTPATIENT
Start: 2017-10-25 | End: 2017-10-30 | Stop reason: HOSPADM

## 2017-10-25 RX ORDER — FENTANYL CITRATE 50 UG/ML
50 INJECTION, SOLUTION INTRAMUSCULAR; INTRAVENOUS
Status: COMPLETED | OUTPATIENT
Start: 2017-10-25 | End: 2017-10-25

## 2017-10-25 RX ORDER — MORPHINE SULFATE 4 MG/ML
4 INJECTION, SOLUTION INTRAMUSCULAR; INTRAVENOUS EVERY 4 HOURS PRN
Status: DISCONTINUED | OUTPATIENT
Start: 2017-10-25 | End: 2017-10-26

## 2017-10-25 RX ORDER — SIMVASTATIN 20 MG/1
40 TABLET, FILM COATED ORAL NIGHTLY
Status: DISCONTINUED | OUTPATIENT
Start: 2017-10-25 | End: 2017-10-30 | Stop reason: HOSPADM

## 2017-10-25 RX ORDER — PANTOPRAZOLE SODIUM 40 MG/1
40 TABLET, DELAYED RELEASE ORAL DAILY
Status: DISCONTINUED | OUTPATIENT
Start: 2017-10-26 | End: 2017-10-30 | Stop reason: HOSPADM

## 2017-10-25 RX ORDER — ONDANSETRON 2 MG/ML
8 INJECTION INTRAMUSCULAR; INTRAVENOUS EVERY 8 HOURS PRN
Status: DISCONTINUED | OUTPATIENT
Start: 2017-10-25 | End: 2017-10-28

## 2017-10-25 RX ORDER — IBUPROFEN 200 MG
1 TABLET ORAL DAILY
Status: DISCONTINUED | OUTPATIENT
Start: 2017-10-26 | End: 2017-10-30 | Stop reason: HOSPADM

## 2017-10-25 RX ORDER — MORPHINE SULFATE 2 MG/ML
6 INJECTION, SOLUTION INTRAMUSCULAR; INTRAVENOUS EVERY 4 HOURS PRN
Status: DISCONTINUED | OUTPATIENT
Start: 2017-10-25 | End: 2017-10-25

## 2017-10-25 RX ORDER — IBUPROFEN 200 MG
16 TABLET ORAL
Status: DISCONTINUED | OUTPATIENT
Start: 2017-10-25 | End: 2017-10-30 | Stop reason: HOSPADM

## 2017-10-25 RX ADMIN — OYSTER SHELL CALCIUM WITH VITAMIN D 1 TABLET: 500; 200 TABLET, FILM COATED ORAL at 09:10

## 2017-10-25 RX ADMIN — HYDROMORPHONE HYDROCHLORIDE 0.5 MG: 1 INJECTION, SOLUTION INTRAMUSCULAR; INTRAVENOUS; SUBCUTANEOUS at 09:10

## 2017-10-25 RX ADMIN — SIMVASTATIN 40 MG: 20 TABLET, FILM COATED ORAL at 09:10

## 2017-10-25 RX ADMIN — FENTANYL CITRATE 50 MCG: 50 INJECTION INTRAMUSCULAR; INTRAVENOUS at 05:10

## 2017-10-25 NOTE — ED NOTES
Pt reports falling today at 3:30 PM , pt reports hitting the left side of her face during the fall. Pt denies dizzines, weakness. Pt reports usually walking with a walker at home, pt reports losing balance reaching too far away from walker.

## 2017-10-25 NOTE — MEDICAL/APP STUDENT
History     Chief Complaint   Patient presents with    Hip Injury     slipped and fell 1 hr PTA,no loc, ,  left leg shortening and outwward rotation.  Usually walks w/ walker Left elbow tear.      Patient is a 89 y/o female with a past medical hx of osteoporosis presenting after a fall at 3:30pm today. Pts  called their daughter to the room after the patient fell. The fall was non witnessed. The daughter reports that she found the Pt against the wall with her left ankle crossed over the right. The Pt reports that she took her hands off of her walker to reach for an object and then slipped. She was unable to stand after the fall. Pt denies dizziness or light-headedness prior to the fall. No head injury or loss of consciousness noted.  Pt reports inability to bend the left knee or rotate the hip. Pt also reports an abrasion to the left elbow. Pt takes a baby ASA 81 mg daily.       The history is provided by the patient and a relative.       Past Medical History:   Diagnosis Date    Arthritis     Cataract     Dysphagia, unspecified(707.20)     Glaucoma     Hyperlipidemia     MGUS (monoclonal gammopathy of unknown significance)     Osteoporosis        Past Surgical History:   Procedure Laterality Date    BACK SURGERY      CHOLECYSTECTOMY      EYE SURGERY Bilateral     CATARACT SURGERY    GALLBLADDER SURGERY      SPINE SURGERY         Family History   Problem Relation Age of Onset    Stroke Mother     Heart disease Father     Cancer Sister     Cancer Brother      ? prostate    No Known Problems Daughter     No Known Problems Son     No Known Problems Son     No Known Problems Son     No Known Problems Son     No Known Problems Daughter     Colon cancer Neg Hx     Cystic fibrosis Neg Hx     Esophageal cancer Neg Hx     Hemochromatosis Neg Hx     Inflammatory bowel disease Neg Hx     Liver cancer Neg Hx     Liver disease Neg Hx     Rectal cancer Neg Hx     Stomach cancer Neg  "Hx     Derrick's disease Neg Hx     Celiac disease Neg Hx        Social History   Substance Use Topics    Smoking status: Current Every Day Smoker     Packs/day: 1.00     Years: 60.00    Smokeless tobacco: Never Used    Alcohol use No       Review of Systems   Constitutional: Negative for chills and fever.   HENT: Negative for ear discharge and rhinorrhea.    Respiratory: Negative for chest tightness and shortness of breath.    Cardiovascular: Negative for chest pain.   Gastrointestinal: Negative for nausea and vomiting.   Musculoskeletal: Positive for arthralgias, gait problem and joint swelling. Negative for back pain and neck pain.   Skin: Positive for wound. Negative for color change.        Left elbow abrasion   Neurological: Negative for dizziness, syncope, weakness, light-headedness, numbness and headaches.       Physical Exam   BP (!) 143/66 (BP Location: Right arm, Patient Position: Sitting)   Pulse 75   Temp 97.9 °F (36.6 °C) (Oral)   Resp 18   Ht 4' 11" (1.499 m)   Wt 43.1 kg (95 lb)   SpO2 95%   BMI 19.19 kg/m²     Physical Exam    Constitutional:   Elderly female appears uncomfortable and in pain   Cardiovascular: Normal heart sounds.   Musculoskeletal: She exhibits edema and tenderness.        Left elbow: She exhibits normal range of motion, no swelling and no deformity. No tenderness found.        Left hip: She exhibits decreased range of motion, decreased strength, tenderness, swelling and deformity. She exhibits no laceration.        Legs:  Neurological: She is alert and oriented to person, place, and time. No sensory deficit.   Skin: Abrasion noted.   Superficial left elbow abrasion, no active bleeding.         ED Course     1. Fall: mechanical in nature.   2. Hip injury: fracture vs dislocation of left hip.   Xray of left hip and femur   Pain control    Insert tash   3. Elbow abrasion:    Wound care per nursing staff    Consider, but do not suspect underlying fracture      "

## 2017-10-25 NOTE — ED NOTES
Pt identifiers accurate with arm band.   LOC: The patient is awake, alert and aware of environment with an appropriate affect, the patient is oriented x 3 and speaking appropriately.  APPEARANCE: Patient resting comfortably and in no acute distress, patient is clean and well groomed  SKIN: The skin is warm and dry, color consistent with ethnicity, patient has normal skin turgor and moist mucus membranes, skin intact.  MUSCULOSKELETAL: Patient moving all extremities well except bilateral lower extremities. Pt reports pain to left hip, elbow and face.   RESPIRATORY: Airway is open and patent, breath sounds clear throughout all lung fields; respirations are spontaneous, patient has a normal effort and rate, no accessory muscle use noted.   CARDIAC: Patient has no peripheral edema noted, capillary refill < 3 seconds. No complaints of chest pain.  ABDOMEN: Soft and non tender to palpation, no distention noted. Bowel sounds present x 4. Pt reports last BM 3 days ago on 10/20  NEUROLOGIC: Cataracts in bilateral eyes, eyes open spontaneously, behavior appropriate to situation, follows commands, facial expression symmetrical, bilateral hand grasp equal and even, purposeful motor response noted, normal sensation in all extremities when touched with a finger. Pt [place].

## 2017-10-25 NOTE — HPI
89 YOF presents with L intertrochanteric femur fx after mechanical FFS.    Fell onto L side with immediate pain and deformity.  Denies numbness, tingling.  Mild pain to R ankle and knee.  No other pain reported.  Walks with walker at baseline.  Daughter present on eval is medical power of .

## 2017-10-26 ENCOUNTER — SURGERY (OUTPATIENT)
Age: 82
End: 2017-10-26

## 2017-10-26 ENCOUNTER — ANESTHESIA (OUTPATIENT)
Dept: SURGERY | Facility: HOSPITAL | Age: 82
DRG: 481 | End: 2017-10-26
Payer: MEDICARE

## 2017-10-26 PROBLEM — S72.142D CLOSED DISPLACED INTERTROCHANTERIC FRACTURE OF LEFT FEMUR WITH ROUTINE HEALING: Status: ACTIVE | Noted: 2017-10-25

## 2017-10-26 PROBLEM — N30.01 ACUTE CYSTITIS WITH HEMATURIA: Status: ACTIVE | Noted: 2017-10-26

## 2017-10-26 LAB
25(OH)D3+25(OH)D2 SERPL-MCNC: 48 NG/ML
ABO + RH BLD: NORMAL
ALBUMIN SERPL BCP-MCNC: 3 G/DL
ALP SERPL-CCNC: 96 U/L
ALT SERPL W/O P-5'-P-CCNC: 13 U/L
ANION GAP SERPL CALC-SCNC: 12 MMOL/L
APTT BLDCRRT: 23.1 SEC
AST SERPL-CCNC: 25 U/L
BACTERIA #/AREA URNS AUTO: ABNORMAL /HPF
BASOPHILS # BLD AUTO: 0.02 K/UL
BASOPHILS # BLD AUTO: 0.03 K/UL
BASOPHILS NFR BLD: 0.2 %
BASOPHILS NFR BLD: 0.2 %
BILIRUB SERPL-MCNC: 0.6 MG/DL
BILIRUB UR QL STRIP: NEGATIVE
BLD GP AB SCN CELLS X3 SERPL QL: NORMAL
BUN SERPL-MCNC: 23 MG/DL
CALCIUM SERPL-MCNC: 10.4 MG/DL
CHLORIDE SERPL-SCNC: 96 MMOL/L
CLARITY UR REFRACT.AUTO: ABNORMAL
CO2 SERPL-SCNC: 27 MMOL/L
COLOR UR AUTO: YELLOW
CREAT SERPL-MCNC: 1 MG/DL
DIFFERENTIAL METHOD: ABNORMAL
DIFFERENTIAL METHOD: ABNORMAL
EOSINOPHIL # BLD AUTO: 0 K/UL
EOSINOPHIL # BLD AUTO: 0.1 K/UL
EOSINOPHIL NFR BLD: 0.2 %
EOSINOPHIL NFR BLD: 0.3 %
ERYTHROCYTE [DISTWIDTH] IN BLOOD BY AUTOMATED COUNT: 13.9 %
ERYTHROCYTE [DISTWIDTH] IN BLOOD BY AUTOMATED COUNT: 14.1 %
EST. GFR  (AFRICAN AMERICAN): 57.3 ML/MIN/1.73 M^2
EST. GFR  (NON AFRICAN AMERICAN): 49.7 ML/MIN/1.73 M^2
GLUCOSE SERPL-MCNC: 125 MG/DL
GLUCOSE UR QL STRIP: NEGATIVE
HCT VFR BLD AUTO: 27.5 %
HCT VFR BLD AUTO: 35 %
HGB BLD-MCNC: 11.3 G/DL
HGB BLD-MCNC: 8.6 G/DL
HGB UR QL STRIP: ABNORMAL
HYALINE CASTS UR QL AUTO: 2 /LPF
IMM GRANULOCYTES # BLD AUTO: 0.04 K/UL
IMM GRANULOCYTES # BLD AUTO: 0.07 K/UL
IMM GRANULOCYTES NFR BLD AUTO: 0.3 %
IMM GRANULOCYTES NFR BLD AUTO: 0.5 %
KETONES UR QL STRIP: NEGATIVE
LEUKOCYTE ESTERASE UR QL STRIP: ABNORMAL
LYMPHOCYTES # BLD AUTO: 1.1 K/UL
LYMPHOCYTES # BLD AUTO: 1.7 K/UL
LYMPHOCYTES NFR BLD: 10.9 %
LYMPHOCYTES NFR BLD: 8.8 %
MAGNESIUM SERPL-MCNC: 2 MG/DL
MCH RBC QN AUTO: 30.7 PG
MCH RBC QN AUTO: 31.2 PG
MCHC RBC AUTO-ENTMCNC: 31.3 G/DL
MCHC RBC AUTO-ENTMCNC: 32.3 G/DL
MCV RBC AUTO: 97 FL
MCV RBC AUTO: 98 FL
MICROSCOPIC COMMENT: ABNORMAL
MONOCYTES # BLD AUTO: 1 K/UL
MONOCYTES # BLD AUTO: 1.2 K/UL
MONOCYTES NFR BLD: 7.7 %
MONOCYTES NFR BLD: 8.1 %
NEUTROPHILS # BLD AUTO: 10.5 K/UL
NEUTROPHILS # BLD AUTO: 12.3 K/UL
NEUTROPHILS NFR BLD: 80 %
NEUTROPHILS NFR BLD: 82.8 %
NITRITE UR QL STRIP: NEGATIVE
NRBC BLD-RTO: 0 /100 WBC
NRBC BLD-RTO: 0 /100 WBC
PH UR STRIP: 6 [PH] (ref 5–8)
PHOSPHATE SERPL-MCNC: 3.6 MG/DL
PLATELET # BLD AUTO: 222 K/UL
PLATELET # BLD AUTO: 300 K/UL
PMV BLD AUTO: 10.4 FL
PMV BLD AUTO: 9.7 FL
POTASSIUM SERPL-SCNC: 4.3 MMOL/L
PREALB SERPL-MCNC: 21 MG/DL
PROCALCITONIN SERPL IA-MCNC: 0.13 NG/ML
PROT SERPL-MCNC: 6.4 G/DL
PROT UR QL STRIP: ABNORMAL
RBC # BLD AUTO: 2.8 M/UL
RBC # BLD AUTO: 3.62 M/UL
RBC #/AREA URNS AUTO: 91 /HPF (ref 0–4)
SODIUM SERPL-SCNC: 135 MMOL/L
SP GR UR STRIP: 1.01 (ref 1–1.03)
SQUAMOUS #/AREA URNS AUTO: 1 /HPF
TRANSFERRIN SERPL-MCNC: 216 MG/DL
URN SPEC COLLECT METH UR: ABNORMAL
UROBILINOGEN UR STRIP-ACNC: NEGATIVE EU/DL
WBC # BLD AUTO: 12.64 K/UL
WBC # BLD AUTO: 15.36 K/UL
WBC #/AREA URNS AUTO: 98 /HPF (ref 0–5)

## 2017-10-26 PROCEDURE — 0QS736Z REPOSITION LEFT UPPER FEMUR WITH INTRAMEDULLARY INTERNAL FIXATION DEVICE, PERCUTANEOUS APPROACH: ICD-10-PCS | Performed by: ORTHOPAEDIC SURGERY

## 2017-10-26 PROCEDURE — 27201423 OPTIME MED/SURG SUP & DEVICES STERILE SUPPLY: Performed by: ORTHOPAEDIC SURGERY

## 2017-10-26 PROCEDURE — 27800517 HC TRAY,EPIDURAL-CONTINUOUS: Performed by: ANESTHESIOLOGY

## 2017-10-26 PROCEDURE — 84100 ASSAY OF PHOSPHORUS: CPT

## 2017-10-26 PROCEDURE — 86900 BLOOD TYPING SEROLOGIC ABO: CPT

## 2017-10-26 PROCEDURE — 37000009 HC ANESTHESIA EA ADD 15 MINS: Performed by: ORTHOPAEDIC SURGERY

## 2017-10-26 PROCEDURE — 25000003 PHARM REV CODE 250: Performed by: NURSE ANESTHETIST, CERTIFIED REGISTERED

## 2017-10-26 PROCEDURE — 81001 URINALYSIS AUTO W/SCOPE: CPT

## 2017-10-26 PROCEDURE — 25000003 PHARM REV CODE 250: Performed by: INTERNAL MEDICINE

## 2017-10-26 PROCEDURE — 99232 SBSQ HOSP IP/OBS MODERATE 35: CPT | Mod: ,,, | Performed by: INTERNAL MEDICINE

## 2017-10-26 PROCEDURE — C1769 GUIDE WIRE: HCPCS | Performed by: ORTHOPAEDIC SURGERY

## 2017-10-26 PROCEDURE — 99221 1ST HOSP IP/OBS SF/LOW 40: CPT | Mod: 57,GC,, | Performed by: ORTHOPAEDIC SURGERY

## 2017-10-26 PROCEDURE — 82306 VITAMIN D 25 HYDROXY: CPT

## 2017-10-26 PROCEDURE — 71000033 HC RECOVERY, INTIAL HOUR: Performed by: ORTHOPAEDIC SURGERY

## 2017-10-26 PROCEDURE — 86901 BLOOD TYPING SEROLOGIC RH(D): CPT

## 2017-10-26 PROCEDURE — C1713 ANCHOR/SCREW BN/BN,TIS/BN: HCPCS | Performed by: ORTHOPAEDIC SURGERY

## 2017-10-26 PROCEDURE — D9220A PRA ANESTHESIA: Mod: CRNA,,, | Performed by: NURSE ANESTHETIST, CERTIFIED REGISTERED

## 2017-10-26 PROCEDURE — 76942 ECHO GUIDE FOR BIOPSY: CPT | Mod: 26,,, | Performed by: ANESTHESIOLOGY

## 2017-10-26 PROCEDURE — 85025 COMPLETE CBC W/AUTO DIFF WBC: CPT | Mod: 91

## 2017-10-26 PROCEDURE — 76942 ECHO GUIDE FOR BIOPSY: CPT | Performed by: ANESTHESIOLOGY

## 2017-10-26 PROCEDURE — 84466 ASSAY OF TRANSFERRIN: CPT

## 2017-10-26 PROCEDURE — 63600175 PHARM REV CODE 636 W HCPCS: Performed by: STUDENT IN AN ORGANIZED HEALTH CARE EDUCATION/TRAINING PROGRAM

## 2017-10-26 PROCEDURE — 36000711: Performed by: ORTHOPAEDIC SURGERY

## 2017-10-26 PROCEDURE — C9399 UNCLASSIFIED DRUGS OR BIOLOG: HCPCS | Performed by: NURSE ANESTHETIST, CERTIFIED REGISTERED

## 2017-10-26 PROCEDURE — 84145 PROCALCITONIN (PCT): CPT

## 2017-10-26 PROCEDURE — 25000003 PHARM REV CODE 250: Performed by: HOSPITALIST

## 2017-10-26 PROCEDURE — 64999 UNLISTED PX NERVOUS SYSTEM: CPT | Mod: 59,LT,, | Performed by: ANESTHESIOLOGY

## 2017-10-26 PROCEDURE — 86920 COMPATIBILITY TEST SPIN: CPT

## 2017-10-26 PROCEDURE — 63600175 PHARM REV CODE 636 W HCPCS: Performed by: NURSE ANESTHETIST, CERTIFIED REGISTERED

## 2017-10-26 PROCEDURE — D9220A PRA ANESTHESIA: Mod: ANES,,, | Performed by: ANESTHESIOLOGY

## 2017-10-26 PROCEDURE — 27245 TREAT THIGH FRACTURE: CPT | Mod: LT,GC,, | Performed by: ORTHOPAEDIC SURGERY

## 2017-10-26 PROCEDURE — 85730 THROMBOPLASTIN TIME PARTIAL: CPT

## 2017-10-26 PROCEDURE — 36000710: Performed by: ORTHOPAEDIC SURGERY

## 2017-10-26 PROCEDURE — 63600175 PHARM REV CODE 636 W HCPCS: Performed by: HOSPITALIST

## 2017-10-26 PROCEDURE — 25000003 PHARM REV CODE 250: Performed by: STUDENT IN AN ORGANIZED HEALTH CARE EDUCATION/TRAINING PROGRAM

## 2017-10-26 PROCEDURE — 37000008 HC ANESTHESIA 1ST 15 MINUTES: Performed by: ORTHOPAEDIC SURGERY

## 2017-10-26 PROCEDURE — 11000001 HC ACUTE MED/SURG PRIVATE ROOM

## 2017-10-26 PROCEDURE — 84134 ASSAY OF PREALBUMIN: CPT

## 2017-10-26 PROCEDURE — 87086 URINE CULTURE/COLONY COUNT: CPT

## 2017-10-26 PROCEDURE — 80053 COMPREHEN METABOLIC PANEL: CPT

## 2017-10-26 PROCEDURE — 71000039 HC RECOVERY, EACH ADD'L HOUR: Performed by: ORTHOPAEDIC SURGERY

## 2017-10-26 PROCEDURE — 83735 ASSAY OF MAGNESIUM: CPT

## 2017-10-26 PROCEDURE — 36415 COLL VENOUS BLD VENIPUNCTURE: CPT

## 2017-10-26 DEVICE — IMPLANTABLE DEVICE: Type: IMPLANTABLE DEVICE | Site: LEG | Status: FUNCTIONAL

## 2017-10-26 RX ORDER — ONDANSETRON 2 MG/ML
4 INJECTION INTRAMUSCULAR; INTRAVENOUS EVERY 12 HOURS PRN
Status: DISCONTINUED | OUTPATIENT
Start: 2017-10-26 | End: 2017-10-30 | Stop reason: HOSPADM

## 2017-10-26 RX ORDER — LIDOCAINE HCL/PF 100 MG/5ML
SYRINGE (ML) INTRAVENOUS
Status: DISCONTINUED | OUTPATIENT
Start: 2017-10-26 | End: 2017-10-26

## 2017-10-26 RX ORDER — PREGABALIN 75 MG/1
75 CAPSULE ORAL
Status: DISCONTINUED | OUTPATIENT
Start: 2017-10-26 | End: 2017-10-26

## 2017-10-26 RX ORDER — MUPIROCIN 20 MG/G
1 OINTMENT TOPICAL
Status: DISCONTINUED | OUTPATIENT
Start: 2017-10-26 | End: 2017-10-26

## 2017-10-26 RX ORDER — PHENYLEPHRINE HYDROCHLORIDE 10 MG/ML
INJECTION INTRAVENOUS
Status: DISCONTINUED | OUTPATIENT
Start: 2017-10-26 | End: 2017-10-26

## 2017-10-26 RX ORDER — PROPOFOL 10 MG/ML
VIAL (ML) INTRAVENOUS
Status: DISCONTINUED | OUTPATIENT
Start: 2017-10-26 | End: 2017-10-26

## 2017-10-26 RX ORDER — SODIUM CHLORIDE 9 MG/ML
INJECTION, SOLUTION INTRAVENOUS
Status: DISCONTINUED | OUTPATIENT
Start: 2017-10-26 | End: 2017-10-26

## 2017-10-26 RX ORDER — METHOCARBAMOL 500 MG/1
1000 TABLET, FILM COATED ORAL EVERY 6 HOURS PRN
Status: DISCONTINUED | OUTPATIENT
Start: 2017-10-26 | End: 2017-10-30 | Stop reason: HOSPADM

## 2017-10-26 RX ORDER — AMOXICILLIN 250 MG
1 CAPSULE ORAL 2 TIMES DAILY
Status: DISCONTINUED | OUTPATIENT
Start: 2017-10-26 | End: 2017-10-30 | Stop reason: HOSPADM

## 2017-10-26 RX ORDER — ONDANSETRON 2 MG/ML
INJECTION INTRAMUSCULAR; INTRAVENOUS
Status: DISCONTINUED | OUTPATIENT
Start: 2017-10-26 | End: 2017-10-26

## 2017-10-26 RX ORDER — PREGABALIN 75 MG/1
75 CAPSULE ORAL NIGHTLY
Status: DISCONTINUED | OUTPATIENT
Start: 2017-10-26 | End: 2017-10-30 | Stop reason: HOSPADM

## 2017-10-26 RX ORDER — ACETAMINOPHEN 500 MG
1000 TABLET ORAL EVERY 6 HOURS
Status: DISCONTINUED | OUTPATIENT
Start: 2017-10-27 | End: 2017-10-30 | Stop reason: HOSPADM

## 2017-10-26 RX ORDER — SODIUM CHLORIDE 9 MG/ML
3 INJECTION, SOLUTION INTRAMUSCULAR; INTRAVENOUS; SUBCUTANEOUS EVERY 8 HOURS PRN
Status: DISCONTINUED | OUTPATIENT
Start: 2017-10-26 | End: 2017-10-28

## 2017-10-26 RX ORDER — POLYETHYLENE GLYCOL 3350 17 G/17G
17 POWDER, FOR SOLUTION ORAL DAILY
Status: DISCONTINUED | OUTPATIENT
Start: 2017-10-27 | End: 2017-10-30 | Stop reason: HOSPADM

## 2017-10-26 RX ORDER — FENTANYL CITRATE 50 UG/ML
25 INJECTION, SOLUTION INTRAMUSCULAR; INTRAVENOUS EVERY 5 MIN PRN
Status: DISCONTINUED | OUTPATIENT
Start: 2017-10-26 | End: 2017-10-26 | Stop reason: HOSPADM

## 2017-10-26 RX ORDER — MUPIROCIN 20 MG/G
OINTMENT TOPICAL
Status: DISCONTINUED | OUTPATIENT
Start: 2017-10-26 | End: 2017-10-26

## 2017-10-26 RX ORDER — ROCURONIUM BROMIDE 10 MG/ML
INJECTION, SOLUTION INTRAVENOUS
Status: DISCONTINUED | OUTPATIENT
Start: 2017-10-26 | End: 2017-10-26

## 2017-10-26 RX ORDER — ROPIVACAINE HYDROCHLORIDE 2 MG/ML
10 INJECTION, SOLUTION EPIDURAL; INFILTRATION; PERINEURAL CONTINUOUS
Status: DISCONTINUED | OUTPATIENT
Start: 2017-10-26 | End: 2017-10-30

## 2017-10-26 RX ORDER — FENTANYL CITRATE 50 UG/ML
25 INJECTION, SOLUTION INTRAMUSCULAR; INTRAVENOUS EVERY 5 MIN PRN
Status: DISCONTINUED | OUTPATIENT
Start: 2017-10-26 | End: 2017-10-26

## 2017-10-26 RX ORDER — BISACODYL 10 MG
10 SUPPOSITORY, RECTAL RECTAL DAILY PRN
Status: DISCONTINUED | OUTPATIENT
Start: 2017-10-26 | End: 2017-10-30 | Stop reason: HOSPADM

## 2017-10-26 RX ORDER — ONDANSETRON 2 MG/ML
4 INJECTION INTRAMUSCULAR; INTRAVENOUS EVERY 12 HOURS PRN
Status: DISCONTINUED | OUTPATIENT
Start: 2017-10-26 | End: 2017-10-26

## 2017-10-26 RX ORDER — ROPIVACAINE HYDROCHLORIDE 2 MG/ML
10 INJECTION, SOLUTION EPIDURAL; INFILTRATION; PERINEURAL CONTINUOUS
Status: DISCONTINUED | OUTPATIENT
Start: 2017-10-26 | End: 2017-10-27

## 2017-10-26 RX ORDER — ACETAMINOPHEN 500 MG
1000 TABLET ORAL EVERY 8 HOURS
Status: COMPLETED | OUTPATIENT
Start: 2017-10-26 | End: 2017-10-27

## 2017-10-26 RX ORDER — SODIUM CHLORIDE 0.9 % (FLUSH) 0.9 %
3 SYRINGE (ML) INJECTION
Status: DISCONTINUED | OUTPATIENT
Start: 2017-10-26 | End: 2017-10-26 | Stop reason: HOSPADM

## 2017-10-26 RX ORDER — ENOXAPARIN SODIUM 100 MG/ML
40 INJECTION SUBCUTANEOUS EVERY 24 HOURS
Status: DISCONTINUED | OUTPATIENT
Start: 2017-10-26 | End: 2017-10-30 | Stop reason: HOSPADM

## 2017-10-26 RX ORDER — SODIUM CHLORIDE 9 MG/ML
INJECTION, SOLUTION INTRAVENOUS CONTINUOUS
Status: DISCONTINUED | OUTPATIENT
Start: 2017-10-26 | End: 2017-10-27

## 2017-10-26 RX ORDER — CEFAZOLIN SODIUM 2 G/50ML
2 SOLUTION INTRAVENOUS
Status: COMPLETED | OUTPATIENT
Start: 2017-10-26 | End: 2017-10-27

## 2017-10-26 RX ORDER — FENTANYL CITRATE 50 UG/ML
INJECTION, SOLUTION INTRAMUSCULAR; INTRAVENOUS
Status: DISCONTINUED | OUTPATIENT
Start: 2017-10-26 | End: 2017-10-26

## 2017-10-26 RX ORDER — MIDAZOLAM HYDROCHLORIDE 1 MG/ML
1 INJECTION INTRAMUSCULAR; INTRAVENOUS EVERY 5 MIN PRN
Status: DISCONTINUED | OUTPATIENT
Start: 2017-10-26 | End: 2017-10-26

## 2017-10-26 RX ORDER — PREGABALIN 75 MG/1
75 CAPSULE ORAL NIGHTLY
Status: DISCONTINUED | OUTPATIENT
Start: 2017-10-26 | End: 2017-10-26

## 2017-10-26 RX ORDER — LIDOCAINE HYDROCHLORIDE 10 MG/ML
1 INJECTION, SOLUTION EPIDURAL; INFILTRATION; INTRACAUDAL; PERINEURAL
Status: DISCONTINUED | OUTPATIENT
Start: 2017-10-26 | End: 2017-10-26

## 2017-10-26 RX ORDER — RAMELTEON 8 MG/1
8 TABLET ORAL NIGHTLY PRN
Status: DISCONTINUED | OUTPATIENT
Start: 2017-10-26 | End: 2017-10-30 | Stop reason: HOSPADM

## 2017-10-26 RX ADMIN — HYDROMORPHONE HYDROCHLORIDE 1 MG: 1 INJECTION, SOLUTION INTRAMUSCULAR; INTRAVENOUS; SUBCUTANEOUS at 09:10

## 2017-10-26 RX ADMIN — PHENYLEPHRINE HYDROCHLORIDE 200 MCG: 10 INJECTION INTRAVENOUS at 06:10

## 2017-10-26 RX ADMIN — SUGAMMADEX 172 MG: 100 INJECTION, SOLUTION INTRAVENOUS at 06:10

## 2017-10-26 RX ADMIN — ENOXAPARIN SODIUM 40 MG: 100 INJECTION SUBCUTANEOUS at 09:10

## 2017-10-26 RX ADMIN — ACETAMINOPHEN 1000 MG: 500 TABLET ORAL at 09:10

## 2017-10-26 RX ADMIN — SODIUM CHLORIDE 500 ML: 900 INJECTION, SOLUTION INTRAVENOUS at 11:10

## 2017-10-26 RX ADMIN — STANDARDIZED SENNA CONCENTRATE AND DOCUSATE SODIUM 1 TABLET: 8.6; 5 TABLET, FILM COATED ORAL at 09:10

## 2017-10-26 RX ADMIN — PREGABALIN 75 MG: 75 CAPSULE ORAL at 09:10

## 2017-10-26 RX ADMIN — ONDANSETRON 4 MG: 2 INJECTION INTRAMUSCULAR; INTRAVENOUS at 06:10

## 2017-10-26 RX ADMIN — FENTANYL CITRATE 50 MCG: 50 INJECTION, SOLUTION INTRAMUSCULAR; INTRAVENOUS at 05:10

## 2017-10-26 RX ADMIN — SIMVASTATIN 40 MG: 20 TABLET, FILM COATED ORAL at 09:10

## 2017-10-26 RX ADMIN — SODIUM CHLORIDE: 0.9 INJECTION, SOLUTION INTRAVENOUS at 11:10

## 2017-10-26 RX ADMIN — CEFTRIAXONE 1 G: 1 INJECTION, SOLUTION INTRAVENOUS at 05:10

## 2017-10-26 RX ADMIN — ROPIVACAINE HYDROCHLORIDE 10 ML/HR: 2 INJECTION, SOLUTION EPIDURAL; INFILTRATION at 07:10

## 2017-10-26 RX ADMIN — FENTANYL CITRATE 25 MCG: 50 INJECTION, SOLUTION INTRAMUSCULAR; INTRAVENOUS at 06:10

## 2017-10-26 RX ADMIN — PHENYLEPHRINE HYDROCHLORIDE 100 MCG: 10 INJECTION INTRAVENOUS at 06:10

## 2017-10-26 RX ADMIN — POLYMYXIN B SULFATE, TRIMETHOPRIM SULFATE 1 DROP: 10000; 1 SOLUTION/ DROPS OPHTHALMIC at 11:10

## 2017-10-26 RX ADMIN — SODIUM CHLORIDE: 0.9 INJECTION, SOLUTION INTRAVENOUS at 04:10

## 2017-10-26 RX ADMIN — SODIUM CHLORIDE: 0.9 INJECTION, SOLUTION INTRAVENOUS at 01:10

## 2017-10-26 RX ADMIN — PHENYLEPHRINE HYDROCHLORIDE 200 MCG: 10 INJECTION INTRAVENOUS at 05:10

## 2017-10-26 RX ADMIN — PHENYLEPHRINE HYDROCHLORIDE 300 MCG: 10 INJECTION INTRAVENOUS at 06:10

## 2017-10-26 RX ADMIN — LIDOCAINE HYDROCHLORIDE 60 MG: 20 INJECTION, SOLUTION INTRAVENOUS at 05:10

## 2017-10-26 RX ADMIN — CEFAZOLIN SODIUM 2 G: 2 SOLUTION INTRAVENOUS at 09:10

## 2017-10-26 RX ADMIN — FENTANYL CITRATE 12.5 MCG: 50 INJECTION INTRAMUSCULAR; INTRAVENOUS at 12:10

## 2017-10-26 RX ADMIN — MUPIROCIN: 20 OINTMENT TOPICAL at 01:10

## 2017-10-26 RX ADMIN — ROCURONIUM BROMIDE 30 MG: 10 INJECTION, SOLUTION INTRAVENOUS at 05:10

## 2017-10-26 RX ADMIN — FENTANYL CITRATE 12.5 MCG: 50 INJECTION INTRAMUSCULAR; INTRAVENOUS at 02:10

## 2017-10-26 RX ADMIN — SODIUM CHLORIDE, SODIUM GLUCONATE, SODIUM ACETATE, POTASSIUM CHLORIDE, MAGNESIUM CHLORIDE, SODIUM PHOSPHATE, DIBASIC, AND POTASSIUM PHOSPHATE: .53; .5; .37; .037; .03; .012; .00082 INJECTION, SOLUTION INTRAVENOUS at 06:10

## 2017-10-26 RX ADMIN — PROPOFOL 40 MG: 10 INJECTION, EMULSION INTRAVENOUS at 05:10

## 2017-10-26 RX ADMIN — PHENYLEPHRINE HYDROCHLORIDE 100 MCG: 10 INJECTION INTRAVENOUS at 05:10

## 2017-10-26 RX ADMIN — OYSTER SHELL CALCIUM WITH VITAMIN D 1 TABLET: 500; 200 TABLET, FILM COATED ORAL at 11:10

## 2017-10-26 NOTE — PROGRESS NOTES
Paged on-call physician for Dr. Hackett (hospital medicine team) regarding patient's urine output  - 60 cc over 4 hours.  Awaiting further orders. [facility]    Rec'd call from physican on call for hospital medicine team.  Patient is being followed by IM-6. I am to call back after 7 am when that team gets to the hospital  [facility]

## 2017-10-26 NOTE — ASSESSMENT & PLAN NOTE
· Patient smaokes 1PPD x 60 years of cigarettes.  · Nicotine patch 21 mg daily while hospitalized.   · Incentive spirometry post-op to prevent post-op pulmonary complications in known active smoker.

## 2017-10-26 NOTE — SUBJECTIVE & OBJECTIVE
Interval History: Patient currently in pre-op and received nerve block to left leg in preparation for surgery. Patient to be taken to OR today by Orthopedics for IM nail for left hip intertrochanteric fracture. Patient is very hard of hearing.     Review of Systems   Unable to perform ROS: Mental status change   Patient seen in Pre-op and had been given Fentanyl prior to my arrival.    Objective:     Vital Signs (Most Recent):  Temp: 97.6 °F (36.4 °C) (10/26/17 1344)  Pulse: 91 (10/26/17 1527)  Resp: 15 (10/26/17 1527)  BP: 109/62 (10/26/17 1527)  SpO2: 96 % (10/26/17 1527) Vital Signs (24h Range):  Temp:  [96.6 °F (35.9 °C)-97.9 °F (36.6 °C)] 97.6 °F (36.4 °C)  Pulse:  [] 91  Resp:  [15-26] 15  SpO2:  [93 %-100 %] 96 %  BP: ()/(56-73) 109/62     Weight: 43.1 kg (95 lb)  Body mass index is 19.19 kg/m².    Intake/Output Summary (Last 24 hours) at 10/26/17 1556  Last data filed at 10/26/17 0900   Gross per 24 hour   Intake                0 ml   Output              250 ml   Net             -250 ml      Physical Exam   Constitutional: No distress.   Thin and frail  female   HENT:   Mouth/Throat: Oropharynx is clear and moist.   Eyes: Conjunctivae are normal.   Neck: Neck supple. No JVD present.   Cardiovascular: Normal rate, regular rhythm and normal heart sounds.  Exam reveals no gallop and no friction rub.    No murmur heard.  Pulmonary/Chest: Effort normal and breath sounds normal. She has no wheezes.   Abdominal: Soft. Bowel sounds are normal. She exhibits no distension. There is no tenderness.   Musculoskeletal: She exhibits no edema or deformity.   Neurological: She is alert.   Skin: Skin is warm. No erythema.   Psychiatric: She has a normal mood and affect.       Significant Labs:   CBC:   Recent Labs  Lab 10/25/17  1702 10/26/17  0433   WBC 8.47 15.36*   HGB 12.3 11.3*   HCT 39.0 35.0*    300     CMP:   Recent Labs  Lab 10/25/17  1702 10/26/17  0433    135*   K 3.5 4.3   CL 95  96   CO2 34* 27   GLU 66* 125*   BUN 19 23   CREATININE 0.8 1.0   CALCIUM 10.2 10.4   PROT  --  6.4   ALBUMIN  --  3.0*   BILITOT  --  0.6   ALKPHOS  --  96   AST  --  25   ALT  --  13   ANIONGAP 9 12   EGFRNONAA >60.0 49.7*     Prealbumin:   Recent Labs  Lab 10/26/17  0013   PREALBUMIN 21     Urine Studies:   Recent Labs  Lab 10/26/17  1055   COLORU Yellow   APPEARANCEUA Cloudy*   PHUR 6.0   SPECGRAV 1.015   PROTEINUA 2+*   GLUCUA Negative   KETONESU Negative   BILIRUBINUA Negative   OCCULTUA 3+*   NITRITE Negative   UROBILINOGEN Negative   LEUKOCYTESUR 3+*   RBCUA 91*   WBCUA 98*   BACTERIA Moderate*   SQUAMEPITHEL 1   HYALINECASTS 2*     Lab Results   Component Value Date    [id number] 48 10/26/2017     Significant Imaging: I have reviewed all pertinent imaging results/findings within the past 24 hours.

## 2017-10-26 NOTE — ASSESSMENT & PLAN NOTE
89 y.o. female with left intertrochanteric femur fx    Pain control  PT/OT: hold until postop  DVT PPx: hold until postop  Abx: preop Ancef  Drain: none  Bianchi: yes    --Admit to medicine hip fracture service for pre-operative clearance and medical evaluation  --To OR tomorrow for operative fixation of left intertrochanteric femur fx  --Pt marked and consented, case booked  --Pre-operative labs and imaging obtained in ED (UA, Type and Cross, PT-INR, CBC, BMP, PreAlbumin, CXR, EKG)   --Bed rest, bianchi, NPO at midnight     Risks and complications were discussed including but not limited to the risks of anesthetic complications, infection, wound healing complications, non-union, mal-union, hardware failure, pain, stiffness, DVT, pulmonary embolism, perioperative medical risks (cardiac, pulmonary, renal, neurologic), and death among others were discussed. No guarantees were made and the patient and family elect to proceed. They fully understand the reported 30% mortality risk within the first year of surgery.

## 2017-10-26 NOTE — SUBJECTIVE & OBJECTIVE
"Principal Problem:Closed displaced intertrochanteric fracture of left femur    Principal Orthopedic Problem: same    Interval History: Patient seen and examined at bedside.  No acute events overnight.  Pain controlled.     Review of patient's allergies indicates:  No Known Allergies    Current Facility-Administered Medications   Medication    0.9%  NaCl infusion    acetaminophen tablet 650 mg    calcium-vitamin D3 500 mg(1,250mg) -200 unit per tablet 1 tablet    dextrose 50% injection 12.5 g    dextrose 50% injection 25 g    glucagon (human recombinant) injection 1 mg    glucose chewable tablet 16 g    glucose chewable tablet 24 g    HYDROmorphone injection 1 mg    ibuprofen tablet 600 mg    influenza (FLUZONE HIGH-DOSE) vaccine 0.5 mL    morphine injection 4 mg    nicotine 14 mg/24 hr 1 patch    ondansetron disintegrating tablet 8 mg    ondansetron injection 8 mg    pantoprazole EC tablet 40 mg    polyethylene glycol packet 17 g    polymyxin B sulf-trimethoprim 10,000 unit- 1 mg/mL ophthalmic drops 1 drop    ramelteon tablet 8 mg    simvastatin tablet 40 mg     Objective:     Vital Signs (Most Recent):  Temp: 96.6 °F (35.9 °C) (10/26/17 0818)  Pulse: 96 (10/26/17 0818)  Resp: 16 (10/26/17 0818)  BP: (!) 97/56 (10/26/17 0818)  SpO2: (!) 94 % (10/26/17 0818) Vital Signs (24h Range):  Temp:  [96.6 °F (35.9 °C)-97.9 °F (36.6 °C)] 96.6 °F (35.9 °C)  Pulse:  [] 96  Resp:  [16-26] 16  SpO2:  [93 %-97 %] 94 %  BP: ()/(56-70) 97/56     Weight: 43.1 kg (95 lb)  Height: 4' 11" (149.9 cm)  Body mass index is 19.19 kg/m².      Intake/Output Summary (Last 24 hours) at 10/26/17 1123  Last data filed at 10/26/17 0900   Gross per 24 hour   Intake                0 ml   Output              250 ml   Net             -250 ml       Ortho/SPM Exam   AAOx4  NAD  RRR  No increased WOB  NVI  WWP extremities  FCDs in place and functioning    Significant Labs: All pertinent labs within the past 24 hours have " been reviewed.    Significant Imaging: I have reviewed all pertinent imaging results/findings.

## 2017-10-26 NOTE — NURSING
I assumed care from ANGELA Mejias.  I have reviewed Magalie's assessment and agree with same.  Will assume care of patient.  Will continue to monitor.

## 2017-10-26 NOTE — PROGRESS NOTES
"[facility]  Orthopedics  Progress Note    Patient Name: Simona Bassett  MRN: [id number]  Admission Date: 10/25/2017  Hospital Length of Stay: 1 days  Attending Provider: Derek Verdin MD  Primary Care Provider: Twila Loera MD  Follow-up For: Procedure(s) (LRB):  IM NAILING OF FEMUR - left - synthes - profx (Left)    Post-Operative Day:    Subjective:     Principal Problem:Closed displaced intertrochanteric fracture of left femur    Principal Orthopedic Problem: same    Interval History: Patient seen and examined at bedside.  No acute events overnight.  Pain controlled.     Review of patient's allergies indicates:  No Known Allergies    Current Facility-Administered Medications   Medication    0.9%  NaCl infusion    acetaminophen tablet 650 mg    calcium-vitamin D3 500 mg(1,250mg) -200 unit per tablet 1 tablet    dextrose 50% injection 12.5 g    dextrose 50% injection 25 g    glucagon (human recombinant) injection 1 mg    glucose chewable tablet 16 g    glucose chewable tablet 24 g    HYDROmorphone injection 1 mg    ibuprofen tablet 600 mg    influenza (FLUZONE HIGH-DOSE) vaccine 0.5 mL    morphine injection 4 mg    nicotine 14 mg/24 hr 1 patch    ondansetron disintegrating tablet 8 mg    ondansetron injection 8 mg    pantoprazole EC tablet 40 mg    polyethylene glycol packet 17 g    polymyxin B sulf-trimethoprim 10,000 unit- 1 mg/mL ophthalmic drops 1 drop    ramelteon tablet 8 mg    simvastatin tablet 40 mg     Objective:     Vital Signs (Most Recent):  Temp: 96.6 °F (35.9 °C) (10/26/17 0818)  Pulse: 96 (10/26/17 0818)  Resp: 16 (10/26/17 0818)  BP: (!) 97/56 (10/26/17 0818)  SpO2: (!) 94 % (10/26/17 0818) Vital Signs (24h Range):  Temp:  [96.6 °F (35.9 °C)-97.9 °F (36.6 °C)] 96.6 °F (35.9 °C)  Pulse:  [] 96  Resp:  [16-26] 16  SpO2:  [93 %-97 %] 94 %  BP: ()/(56-70) 97/56     Weight: 43.1 kg (95 lb)  Height: 4' 11" (149.9 cm)  Body mass index is 19.19 " kg/m².      Intake/Output Summary (Last 24 hours) at 10/26/17 1123  Last data filed at 10/26/17 0900   Gross per 24 hour   Intake                0 ml   Output              250 ml   Net             -250 ml       Ortho/SPM Exam   AAOx4  NAD  RRR  No increased WOB  NVI  WWP extremities  FCDs in place and functioning    Significant Labs: All pertinent labs within the past 24 hours have been reviewed.    Significant Imaging: I have reviewed all pertinent imaging results/findings.    Assessment/Plan:     * Closed displaced intertrochanteric fracture of left femur    89 y.o. female with left intertrochanteric femur fx    Pain control  PT/OT: hold until postop  DVT PPx: hold until postop  Abx: preop Ancef  Drain: none  Bianchi: yes    --Admitted to medicine hip fracture service for pre-operative clearance and medical evaluation  --To OR today for operative fixation of left intertrochanteric femur fx  --Pt marked and consented, case booked  --Pre-operative labs and imaging obtained in ED (UA, Type and Cross, PT-INR, CBC, BMP, PreAlbumin, CXR, EKG)   --Bed rest, bianchi, NPO    Risks and complications were discussed including but not limited to the risks of anesthetic complications, infection, wound healing complications, non-union, mal-union, hardware failure, pain, stiffness, DVT, pulmonary embolism, perioperative medical risks (cardiac, pulmonary, renal, neurologic), and death among others were discussed. No guarantees were made and the patient and family elect to proceed. They fully understand the reported 30% mortality risk within the first year of surgery.         Closed fracture of base of fifth metatarsal bone    L pseudojones fx  - will obtain boot after surgery  - WB status based on hip postop              Fletcher Polk MD  Orthopedics  [facility]

## 2017-10-26 NOTE — HPI
Ms. Simona Bassett is a 89 y/o female with osteoporosis, GERD, HLP and recent left ankle fracture who presents to the ED after she had a fall in her house today. The history is obtained by the daughter as the patient is hard of hearing. The daughter mentions that she was in her bedroom with her hands off of the walker trying to get her diapers, when she lost her balance and fell on her left hip and left elbow. After the fall, she was unable to ambulate. She normally ambulates around the house using a walker.  The patient claims she didn't hit her head when she fell. She does take Aspirin 81mg daily, last dose was on 10/24. She denies any numbness or tingling in her left leg. X-rays done in ER revealed nondisplaced fracture base of fifth metatarsal on left foot and comminuted impacted intertrochanteric fracture of the left femur.

## 2017-10-26 NOTE — ANESTHESIA PROCEDURE NOTES
Suprainguinal Fascia Iliaca Catheter    Patient location during procedure: pre-op   Block not for primary anesthetic.  Reason for block: at surgeon's request and post-op pain management   Post-op Pain Location: L hip pain  Start time: 10/26/2017 2:22 PM  Timeout: 10/26/2017 2:22 PM   End time: 10/26/2017 2:55 PM  Staffing  Anesthesiologist: MARKUS PATEL  Resident/CRNA: BISI DAS JR.  Performed: resident/CRNA   Preanesthetic Checklist  Completed: patient identified, site marked, surgical consent, pre-op evaluation, timeout performed, IV checked, risks and benefits discussed and monitors and equipment checked  Peripheral Block  Patient position: supine  Prep: ChloraPrep and site prepped and draped  Patient monitoring: heart rate, cardiac monitor, continuous pulse ox, continuous capnometry and frequent blood pressure checks  Block type: fascia iliaca (Suprainguinal fascia iliaca)  Laterality: left  Injection technique: continuous  Needle  Needle type: Tuohy   Needle gauge: 17 G  Needle length: 3.5 in  Needle localization: anatomical landmarks and ultrasound guidance  Catheter type: spring wound  Catheter size: 19 G  Test dose: lidocaine 1.5% with Epi 1-to-200,000 and negative   -ultrasound image captured on disc.  Assessment  Injection assessment: negative aspiration, negative parasthesia and local visualized surrounding nerve  Paresthesia pain: none  Heart rate change: no  Slow fractionated injection: yes  Medications:  Bolus administered: 40 mL of 0.25 ropivacaine  Epinephrine added: 3.75 mcg/mL (1/300,000)  Additional Notes  VSS.  [facility] RN monitoring vitals throughout procedure.  Patient tolerated procedure well.

## 2017-10-26 NOTE — SUBJECTIVE & OBJECTIVE
Past Medical History:   Diagnosis Date    Arthritis     Cataract     Dysphagia, unspecified(787.20)     Glaucoma     Hyperlipidemia     MGUS (monoclonal gammopathy of unknown significance)     Osteoporosis        Past Surgical History:   Procedure Laterality Date    BACK SURGERY      CHOLECYSTECTOMY      EYE SURGERY Bilateral     CATARACT SURGERY    GALLBLADDER SURGERY      SPINE SURGERY         Review of patient's allergies indicates:  No Known Allergies    No current facility-administered medications on file prior to encounter.      Current Outpatient Prescriptions on File Prior to Encounter   Medication Sig    aspirin 81 mg Tab Every day    CALCIUM CARBONATE/VITAMIN D3 (CALCIUM 500 + D ORAL) Twice a day    lactulose (CHRONULAC) 10 gram/15 mL solution as needed.     nitroGLYCERIN (NITROSTAT) 0.4 MG SL tablet Place 1 tablet (0.4 mg total) under the tongue every 5 (five) minutes as needed for Chest pain. Every day PRN    polymyxin B sulf-trimethoprim (POLYTRIM) 10,000 unit- 1 mg/mL Drop Place 1 drop into the left eye 3 (three) times daily.     simvastatin (ZOCOR) 40 MG tablet TAKE ONE TABLET BY MOUTH ONCE DAILY IN THE EVENING    tramadol (ULTRAM) 50 mg tablet Take 1 tablet (50 mg total) by mouth once daily.    esomeprazole (NEXIUM) 20 MG capsule Take 1 capsule (20 mg total) by mouth before breakfast. (Patient taking differently: Take 20 mg by mouth before breakfast. Daily as needed)    [DISCONTINUED] mupirocin (BACTROBAN) 2 % ointment 1 Tube. AAA an needed    [DISCONTINUED] potassium chloride SA (K-DUR,KLOR-CON) 20 MEQ tablet 1 tab po q day    [DISCONTINUED] VIT A/VIT C/VIT E/ZINC/COPPER (PRESERVISION AREDS ORAL) Take by mouth.     Family History     Problem Relation (Age of Onset)    Cancer Sister, Brother    Heart disease Father    No Known Problems Daughter, Son, Son, Son, Son, Daughter    Stroke Mother        Social History Main Topics    Smoking status: Current Every Day Smoker      Packs/day: 1.00     Years: 60.00    Smokeless tobacco: Never Used    Alcohol use No    Drug use: No    Sexual activity: Not on file     Review of Systems   Constitutional: Negative for chills, fatigue and fever.   HENT: Negative for sore throat and trouble swallowing.    Eyes: Positive for visual disturbance. Negative for photophobia.   Respiratory: Negative for cough and shortness of breath.    Cardiovascular: Negative for chest pain, palpitations and leg swelling.   Gastrointestinal: Positive for constipation. Negative for abdominal pain, diarrhea, nausea and vomiting.   Endocrine: Negative for cold intolerance and heat intolerance.   Genitourinary: Negative for dysuria and frequency.   Musculoskeletal: Positive for gait problem. Negative for arthralgias and myalgias.   Skin: Negative for rash and wound.   Neurological: Negative for dizziness, syncope, weakness and light-headedness.   Psychiatric/Behavioral: Negative for confusion and hallucinations.   All other systems reviewed and are negative.    Objective:     Vital Signs (Most Recent):  Temp: 97.9 °F (36.6 °C) (10/25/17 1618)  Pulse: 86 (10/25/17 1938)  Resp: (!) 26 (10/25/17 1938)  BP: 126/67 (10/25/17 1938)  SpO2: 97 % (10/25/17 1938) Vital Signs (24h Range):  Temp:  [97.9 °F (36.6 °C)] 97.9 °F (36.6 °C)  Pulse:  [75-86] 86  Resp:  [18-26] 26  SpO2:  [95 %-97 %] 97 %  BP: (118-143)/(66-68) 126/67     Weight: 43.1 kg (95 lb)  Body mass index is 19.19 kg/m².    Physical Exam   Constitutional: She is oriented to person, place, and time.   Thin, cachectic, appears stated age   HENT:   Head: Normocephalic and atraumatic.   Mouth/Throat: Oropharynx is clear and moist.   Eyes: EOM are normal. No scleral icterus.   Left cataract   Neck: Normal range of motion. Neck supple.   Cardiovascular: Normal rate and regular rhythm.    No murmur heard.  Pulmonary/Chest: Effort normal and breath sounds normal. No respiratory distress. She has no wheezes.   Abdominal: Soft.  Bowel sounds are normal. She exhibits no distension. There is no tenderness.   Musculoskeletal: She exhibits deformity (Left leg shortened and externally rotated). She exhibits no edema.   Neurological: She is alert and oriented to person, place, and time.   Skin: Skin is warm and dry.   Psychiatric: She has a normal mood and affect. Her behavior is normal. Judgment and thought content normal.        Significant Labs:   CBC:   Recent Labs  Lab 10/25/17  1702   WBC 8.47   HGB 12.3   HCT 39.0        CMP:   Recent Labs  Lab 10/25/17  1702      K 3.5   CL 95   CO2 34*   GLU 66*   BUN 19   CREATININE 0.8   CALCIUM 10.2   ANIONGAP 9   EGFRNONAA >60.0     All pertinent labs within the past 24 hours have been reviewed.    Significant Imaging: Hip xray with left fracture

## 2017-10-26 NOTE — SUBJECTIVE & OBJECTIVE
Past Medical History:   Diagnosis Date    Arthritis     Cataract     Dysphagia, unspecified(787.20)     Glaucoma     Hyperlipidemia     MGUS (monoclonal gammopathy of unknown significance)     Osteoporosis        Past Surgical History:   Procedure Laterality Date    BACK SURGERY      CHOLECYSTECTOMY      EYE SURGERY Bilateral     CATARACT SURGERY    GALLBLADDER SURGERY      SPINE SURGERY         Review of patient's allergies indicates:  No Known Allergies    Current Facility-Administered Medications   Medication    acetaminophen tablet 650 mg    calcium-vitamin D3 500 mg(1,250mg) -200 unit per tablet 1 tablet    dextrose 50% injection 12.5 g    dextrose 50% injection 25 g    glucagon (human recombinant) injection 1 mg    glucose chewable tablet 16 g    glucose chewable tablet 24 g    HYDROmorphone injection 0.5 mg    HYDROmorphone injection 1 mg    ibuprofen tablet 600 mg    morphine injection 4 mg    [START ON 10/26/2017] nicotine 14 mg/24 hr 1 patch    ondansetron disintegrating tablet 8 mg    ondansetron injection 8 mg    [START ON 10/26/2017] pantoprazole EC tablet 40 mg    [START ON 10/26/2017] polyethylene glycol packet 17 g    polymyxin B sulf-trimethoprim 10,000 unit- 1 mg/mL ophthalmic drops 1 drop    ramelteon tablet 8 mg    simvastatin tablet 40 mg     Family History     Problem Relation (Age of Onset)    Cancer Sister, Brother    Heart disease Father    No Known Problems Daughter, Son, Son, Son, Son, Daughter    Stroke Mother        Social History Main Topics    Smoking status: Current Every Day Smoker     Packs/day: 1.00     Years: 60.00    Smokeless tobacco: Never Used    Alcohol use No    Drug use: No    Sexual activity: Not on file     ROS   Constitutional: negative for fevers  Eyes: no visual changes  ENT: negative for hearing loss  Respiratory: negative for dyspnea  Cardiovascular: negative for chest pain  Gastrointestinal: negative for abdominal  "pain  Genitourinary: negative for dysuria  Neurological: negative for headaches  Behavioral/Psych: negative for hallucinations  Endocrine: negative for temperature intolerance    Objective:     Vital Signs (Most Recent):  Temp: 97.3 °F (36.3 °C) (10/25/17 2024)  Pulse: 87 (10/25/17 2024)  Resp: 20 (10/25/17 2024)  BP: 121/65 (10/25/17 2024)  SpO2: (!) 94 % (10/25/17 2024) Vital Signs (24h Range):  Temp:  [97.3 °F (36.3 °C)-97.9 °F (36.6 °C)] 97.3 °F (36.3 °C)  Pulse:  [75-87] 87  Resp:  [18-26] 20  SpO2:  [94 %-97 %] 94 %  BP: (118-143)/(65-68) 121/65     Weight: 43.1 kg (95 lb)  Height: 4' 11" (149.9 cm)  Body mass index is 19.19 kg/m².    No intake or output data in the 24 hours ending 10/25/17 2030    Ortho/SPM Exam  MSK:  LLE:   Skin intact  Leg shortened, externally rotated  TTP L knee and ankle  SILT T/SP/DP/Martins/Sa  Motor intact T/SP/DP  Brisk cap refill  Warm well perfused extremities  DP palpable and equal to contralateral    No pain with B wrist/elbow/shoulder ROM  No pain with R ankle/knee/hip ROM  No TTP spine/pelvis  Blanching erythema to sacrum; no decubitus ulcers    Significant Labs: All pertinent labs within the past 24 hours have been reviewed.    Significant Imaging: I have reviewed all pertinent imaging results/findings.     XR ap pelvis shows L intertrochanteric femur fx.  XR R foot/ankle/tib/fib/knee negative for additional fx or dislocation.  "

## 2017-10-26 NOTE — ASSESSMENT & PLAN NOTE
Patient on admit U/A with 98 WBCs and moderate bacteria and negative nitrite consistent with likely UTI. Will send urine for urine culture and start Rocephin 1 gram IV daily to treat and will need 3 days of antibiotics. Patient with no signs to suggest sepsis so okay to proceed to hip fracture surgery today.

## 2017-10-26 NOTE — PROGRESS NOTES
Called Brandi Bassett who is the patient's daughter and gave an update on patient's status and that patient is not due to roll til 17:00.

## 2017-10-26 NOTE — ASSESSMENT & PLAN NOTE
89 y.o. female with left intertrochanteric femur fx    Pain control  PT/OT: hold until postop  DVT PPx: hold until postop  Abx: preop Ancef  Drain: none  Bianchi: yes    --Admitted to medicine hip fracture service for pre-operative clearance and medical evaluation  --To OR today for operative fixation of left intertrochanteric femur fx  --Pt marked and consented, case booked  --Pre-operative labs and imaging obtained in ED (UA, Type and Cross, PT-INR, CBC, BMP, PreAlbumin, CXR, EKG)   --Bed rest, bianchi, NPO    Risks and complications were discussed including but not limited to the risks of anesthetic complications, infection, wound healing complications, non-union, mal-union, hardware failure, pain, stiffness, DVT, pulmonary embolism, perioperative medical risks (cardiac, pulmonary, renal, neurologic), and death among others were discussed. No guarantees were made and the patient and family elect to proceed. They fully understand the reported 30% mortality risk within the first year of surgery.

## 2017-10-26 NOTE — ASSESSMENT & PLAN NOTE
· Patient admitted with hip fracture related to osteoporosis. Patient will need outpatient DEXA scan and further evaluation for additional treatment of osteoporosis. Will refer patient to [facility] on discharge for further evaluation and management of osteoporosis.    · Continue Calcium and Vitamin D to treat.   · Vitamin D level is adequate at 48 on this admit so no additional Vitamin D replacement needed.

## 2017-10-26 NOTE — PROGRESS NOTES
Dr. Barber with internal medicine at bedside. Stated to disregard duplicate orders including pre op labs and pre op lyrica. Stated lyrica to be started tonight and labs to be redrawn tomorrow morning.

## 2017-10-26 NOTE — ASSESSMENT & PLAN NOTE
Patient is day of surgery for surgical repair of left hip fracture. Patient reports no pain in left hip but just received nerve block to left leg by anesthesia. Patient to go to OR today by Orthopedics for operative repair of hip fracture. Patient will start PT/OT in the am tomorrow for gait training and strengthening and restoration of ADL's. Patient is NWB: left lower extremity, as per Orthopedic recommendations. Plan is to start Lovenox 40 mg subcutaneous daily 12 hours post-op and continue LULÚ/SCDs for DVT prophylaxis for now. Perineural pain catheter in place for pain control and being managed by Anesthesia Pain Service. Continue Bean to gravity and remove on POD #1. Continue IVF's until patient tolerating oral diet > 4 hours then will discontinue IVF's and saline lock IV.

## 2017-10-26 NOTE — H&P
[facility] Hospital Medicine  History & Physical    Patient Name: Simona Bassett  MRN: [id number]  Admission Date: 10/25/2017  Attending Physician: Derek Verdin MD   Primary Care Provider: Twila Loera MD    [facility] Medicine Team: [facility] Maria Del Rosario Hackett MD     Patient information was obtained from patient and relative(s).     Subjective:     Principal Problem:Closed fracture of left hip    Chief Complaint:   Chief Complaint   Patient presents with    Hip Injury     slipped and fell 1 hr PTA,no loc, ,  left leg shortening and outwward rotation.  Usually walks w/ walker Left elbow tear.         HPI: Ms. Simona Bassett is a 89yo female with osteoporosis and recent left ankle fracture who presents to the ED after she had a fall in her house today.  The history is obtained by the daughter as the patient is hard of hearing.  The daughter mentions that she was in her bedroom with her hands off of the walker trying to get her diapers, when she lost her balance and fell on her left hip and left elbow.  After the fall, she was unable to ambulate.  She normally ambulates around the house using a walker.  The patient claims she didn't hit her head when she fell.   She does take Aspirin 81mg daily, last dose was on 10/24.  She denies any numbness or tingling in her left leg.    No new subjective & objective note has been filed under this hospital service since the last note was generated.    Assessment/Plan:     * Closed fracture of left hip    · Ortho consulted, appreciate assistance  · Pain management per Ortho  · PT/OT when ok by Ortho.  Ambulates with a walker at home  · Continue bianchi    Surgical Risk Assessment     Active cardiac issues:  Active decompensated heart failure? No   Unstable angina?  No   Significant uncontrolled arrhythmias? No   Severe valvular heart disease-Aortic or Mitral Stenosis? No   Recent MI or coronary revascularization < 30 days? No     Cardiac Risk  Factors  High risk surgery? No   History of CAD/ischemic heart disease? No   History of cerebrovascular disease? No   History of compensated heart failure? No   Type 2 diabetes requiring insulin? No   Serum Creatinine > 2? No   Total cardiac risk factors 0     Functional mets >4    < 4 METs -unable to walk > 2 blocks on level ground without stopping due to symptoms  - eating, dressing, toileting, walking indoors, light housework. POOR   > 4 METs -climbing > 1 flight of stairs without stopping  -walking up hill > 1-2 blocks  -scrubbing floors  -moving furniture  - golf, bowling, dancing or tennis  -running short distance MODERATE to EXCELLENT     Perioperative Risk Assessment:  Patient is at low risk for perioperative cardiopulmonary complications.  Recommend proceed to surgery as planned. Incentive Spirometry post-op because of tobacco abuse to help prevent atelectasis        Poor appetite    · Boost TID          Chronic pain    · On Tramadol at home  · Pain management per Ortho          Constipation due to pain medication    · Start Miralax          GERD (gastroesophageal reflux disease)    · Continue PPI          Tobacco abuse    · Smokes 1PPD x 60 years  · Nicotine patch  · Incentive spirometry post-op          Osteoporosis, post-menopausal    · Continue Calcium and Vitamin D  · Check Vitamin D level          Hyperlipidemia    · Continue Simvastatin            VTE Risk Mitigation         Ordered     Place LULÚ hose  Until discontinued      10/25/17 2010     High Risk of VTE  Once      10/25/17 1944     Medium Risk of VTE  Once      10/25/17 1944           Dispo pending Ortho recs      Maria Del Rosario Hackett MD  Department of Hospital Medicine   [facility]

## 2017-10-26 NOTE — PLAN OF CARE
Problem: Patient Care Overview  Goal: Plan of Care Review  Pt AAOx3, VSS, no complaints at this time, pain medication given, daughter placed folded blanket under LLE per ok from MD to make patient comfortable by all means, pt is hard of hearing without her aid so speaking directly into ear is necessary for communication, no s/s of skin breakdown noted, no falls noted as precautions remain. Will resume with plan of care.

## 2017-10-26 NOTE — ASSESSMENT & PLAN NOTE
· Ortho consulted, appreciate assistance  · Pain management per Ortho  · PT/OT when ok by Ortho.  Ambulates with a walker at home  · Continue bianchi    Surgical Risk Assessment     Active cardiac issues:  Active decompensated heart failure? No   Unstable angina?  No   Significant uncontrolled arrhythmias? No   Severe valvular heart disease-Aortic or Mitral Stenosis? No   Recent MI or coronary revascularization < 30 days? No     Cardiac Risk Factors  High risk surgery? No   History of CAD/ischemic heart disease? No   History of cerebrovascular disease? No   History of compensated heart failure? No   Type 2 diabetes requiring insulin? No   Serum Creatinine > 2? No   Total cardiac risk factors 0     Functional mets >4    < 4 METs -unable to walk > 2 blocks on level ground without stopping due to symptoms  - eating, dressing, toileting, walking indoors, light housework. POOR   > 4 METs -climbing > 1 flight of stairs without stopping  -walking up hill > 1-2 blocks  -scrubbing floors  -moving furniture  - golf, bowling, dancing or tennis  -running short distance MODERATE to EXCELLENT     Perioperative Risk Assessment:  Patient is at low risk for perioperative cardiopulmonary complications.  Recommend proceed to surgery as planned. Incentive Spirometry post-op because of tobacco abuse to help prevent atelectasis

## 2017-10-26 NOTE — CONSULTS
[facility]  Orthopedics  Consult Note    Patient Name: Simona Bassett  MRN: [id number]  Admission Date: 10/25/2017  Hospital Length of Stay: 0 days  Attending Provider: Derek Verdin MD  Primary Care Provider: Twila Loera MD    Patient information was obtained from patient, relative(s), past medical records and ER records.     Inpatient consult to Orthopedics  Consult performed by: TATIANNA PRASAD  Consult ordered by: VANDA ALLEN        Subjective:     Principal Problem:Closed displaced intertrochanteric fracture of left femur    Chief Complaint:   Chief Complaint   Patient presents with    Hip Injury     slipped and fell 1 hr PTA,no loc, ,  left leg shortening and outwward rotation.  Usually walks w/ walker Left elbow tear.         HPI: 89 YOF presents with L intertrochanteric femur fx after mechanical FFS.    Fell onto L side with immediate pain and deformity.  Denies numbness, tingling.  Mild pain to R ankle and knee.  No other pain reported.  Walks with walker at baseline.  Daughter present on eval is medical power of .    Past Medical History:   Diagnosis Date    Arthritis     Cataract     Dysphagia, unspecified(787.20)     Glaucoma     Hyperlipidemia     MGUS (monoclonal gammopathy of unknown significance)     Osteoporosis        Past Surgical History:   Procedure Laterality Date    BACK SURGERY      CHOLECYSTECTOMY      EYE SURGERY Bilateral     CATARACT SURGERY    GALLBLADDER SURGERY      SPINE SURGERY         Review of patient's allergies indicates:  No Known Allergies    Current Facility-Administered Medications   Medication    acetaminophen tablet 650 mg    calcium-vitamin D3 500 mg(1,250mg) -200 unit per tablet 1 tablet    dextrose 50% injection 12.5 g    dextrose 50% injection 25 g    glucagon (human recombinant) injection 1 mg    glucose chewable tablet 16 g    glucose chewable tablet 24 g    HYDROmorphone injection 0.5 mg     "HYDROmorphone injection 1 mg    ibuprofen tablet 600 mg    morphine injection 4 mg    [START ON 10/26/2017] nicotine 14 mg/24 hr 1 patch    ondansetron disintegrating tablet 8 mg    ondansetron injection 8 mg    [START ON 10/26/2017] pantoprazole EC tablet 40 mg    [START ON 10/26/2017] polyethylene glycol packet 17 g    polymyxin B sulf-trimethoprim 10,000 unit- 1 mg/mL ophthalmic drops 1 drop    ramelteon tablet 8 mg    simvastatin tablet 40 mg     Family History     Problem Relation (Age of Onset)    Cancer Sister, Brother    Heart disease Father    No Known Problems Daughter, Son, Son, Son, Son, Daughter    Stroke Mother        Social History Main Topics    Smoking status: Current Every Day Smoker     Packs/day: 1.00     Years: 60.00    Smokeless tobacco: Never Used    Alcohol use No    Drug use: No    Sexual activity: Not on file     ROS   Constitutional: negative for fevers  Eyes: no visual changes  ENT: negative for hearing loss  Respiratory: negative for dyspnea  Cardiovascular: negative for chest pain  Gastrointestinal: negative for abdominal pain  Genitourinary: negative for dysuria  Neurological: negative for headaches  Behavioral/Psych: negative for hallucinations  Endocrine: negative for temperature intolerance    Objective:     Vital Signs (Most Recent):  Temp: 97.3 °F (36.3 °C) (10/25/17 2024)  Pulse: 87 (10/25/17 2024)  Resp: 20 (10/25/17 2024)  BP: 121/65 (10/25/17 2024)  SpO2: (!) 94 % (10/25/17 2024) Vital Signs (24h Range):  Temp:  [97.3 °F (36.3 °C)-97.9 °F (36.6 °C)] 97.3 °F (36.3 °C)  Pulse:  [75-87] 87  Resp:  [18-26] 20  SpO2:  [94 %-97 %] 94 %  BP: (118-143)/(65-68) 121/65     Weight: 43.1 kg (95 lb)  Height: 4' 11" (149.9 cm)  Body mass index is 19.19 kg/m².    No intake or output data in the 24 hours ending 10/25/17 2030    Ortho/SPM Exam  MSK:  LLE:   Skin intact  Leg shortened, externally rotated  TTP L knee and ankle  SILT T/SP/DP/Martins/Sa  Motor intact T/SP/DP  Brisk cap " refill  Warm well perfused extremities  DP palpable and equal to contralateral    No pain with B wrist/elbow/shoulder ROM  No pain with R ankle/knee/hip ROM  No TTP spine/pelvis  Blanching erythema to sacrum; no decubitus ulcers    Significant Labs: All pertinent labs within the past 24 hours have been reviewed.    Significant Imaging: I have reviewed all pertinent imaging results/findings.     XR ap pelvis shows L intertrochanteric femur fx.  XR R foot/ankle/tib/fib/knee negative for additional fx or dislocation.    Assessment/Plan:     * Closed displaced intertrochanteric fracture of left femur    89 y.o. female with left intertrochanteric femur fx    Pain control  PT/OT: hold until postop  DVT PPx: hold until postop  Abx: preop Ancef  Drain: none  Bianchi: yes    --Admit to medicine hip fracture service for pre-operative clearance and medical evaluation  --To OR tomorrow for operative fixation of left intertrochanteric femur fx  --Pt marked and consented, case booked  --Pre-operative labs and imaging obtained in ED (UA, Type and Cross, PT-INR, CBC, BMP, PreAlbumin, CXR, EKG)   --Bed rest, biacnhi, NPO at midnight     Risks and complications were discussed including but not limited to the risks of anesthetic complications, infection, wound healing complications, non-union, mal-union, hardware failure, pain, stiffness, DVT, pulmonary embolism, perioperative medical risks (cardiac, pulmonary, renal, neurologic), and death among others were discussed. No guarantees were made and the patient and family elect to proceed. They fully understand the reported 30% mortality risk within the first year of surgery.           Left pseudojones fx  -WBAT in boot; will get boot after surgery tomorrow    Thank you for your consult.     Fletcher Polk MD  Orthopedics  [facility]

## 2017-10-26 NOTE — PROGRESS NOTES
Patient's urine output only 60 cc in 4 hours. Patient currently NPO and not receiving any IVF. Patient scheduled for surgery this morning.  Paged on-call physician for Dr. Verdin. Awaiting further orders. MARLENE

## 2017-10-26 NOTE — NURSING
Pt requests not to be turn at the time due to L hip discomfort, pt received pain med for discomfort

## 2017-10-26 NOTE — HOSPITAL COURSE
In the ED x-rays showed a comminuted impacted intertrochanteric fracture of the left femur and non-displaced fifth metatarsal fracture to left foot. Patient admitted to hospital with Orthopedic surgery consult. Patient was seen and evaluated by Orthopedic surgery who recommended operative repair of hip fracture but patient does not require any operative repair of fifth metatarsal fracture. Patient was medically optimized prior to surgery and taken to OR after optimization on 10/26/2017. Patient underwent left hip IM nail for intertrochanteric fracture. Post-op patient is TTWB to the left lower extremity as per Orthopedics recommendation. Patient was placed on Lovenox 40 mg subcutaneous daily and LULÚ/SCD's for DVT prophylaxis post-op. Perineural pain catheter placed by Anesthesia Pain Service with continuous infusion of Ropivacaine to help with pain control. PT/OT consulted and evaluating patient and recommending SNF. SNF choices obtained from family and case management and  working on placement. 3 choices given by family for [facility], [facility] or [facility] and all 3 referrals placed. Pain well controlled to left hip. Patient was noted to have drop in Hgb to 6.5 on 10/28 and transfused 1 unit of PRBCs. Blood loss expected after surgery and patient hemodynamically stable with no signs of active bleeding post-op. Patient with good improvement in Hgb after blood transfusion with appropriate response with Hgb 8.9 on 10/29. Patient with no signs of bleeding on discharge and Perineural catheter removed by Anesthesia and pain well controlled with Tylenol and Tramadol as needed that patient to be discharged to SNF. Patient being discharged to [facility] on 10/30. Patient with walking boot for left 5th metatarsal fracture and patient to wear when ambulating with therapy at SNF. Patient to continue Lovenox 40 mg subcutaneous daily for DVT prophylaxis on  discharge for hip fracture with end date of 11/23/2017. Patient to follow-up [place] orthopedic Clinic on 11/30/2017 with Dr. Brijesh Treviño and surgical bandage to remain in place until Orthopedic clinic follow-up.

## 2017-10-26 NOTE — PROGRESS NOTES
[facility] Hospital Medicine  Progress Note    Patient Name: Simona Bassett  MRN: [id number]  Patient Class: IP- Inpatient   Admission Date: 10/25/2017  Length of Stay: 1 days  Attending Physician: Elma Harris MD  Primary Care Provider: Twila Loera MD    [facility] Medicine Team: [facility] Elma Harris MD    Subjective:     Principal Problem:Closed displaced intertrochanteric fracture of left femur with routine healing    HPI:  Ms. Simona Bassett is a 89 y/o female with osteoporosis, GERD, HLP and recent left ankle fracture who presents to the ED after she had a fall in her house today. The history is obtained by the daughter as the patient is hard of hearing. The daughter mentions that she was in her bedroom with her hands off of the walker trying to get her diapers, when she lost her balance and fell on her left hip and left elbow. After the fall, she was unable to ambulate. She normally ambulates around the house using a walker.  The patient claims she didn't hit her head when she fell. She does take Aspirin 81mg daily, last dose was on 10/24. She denies any numbness or tingling in her left leg. X-rays done in ER revealed nondisplaced fracture base of fifth metatarsal on left foot and comminuted impacted intertrochanteric fracture of the left femur.    Hospital Course:  In the ED x-rays showed a comminuted impacted intertrochanteric fracture of the left femur and non-displaced fifth metatarsal fracture to left foot. Patient admitted to hospital with Orthopedic surgery consult. Patient was seen and evaluated by Orthopedic surgery who recommended operative repair of hip fracture but patient does not require any operative repair of fifth metatarsal fracture. Patient was medically optimized prior to surgery and taken to OR after optimization on 10/26/2017.     Interval History: Patient currently in pre-op and received nerve block to left leg in preparation for surgery.  Patient to be taken to OR today by Orthopedics for IM nail for left hip intertrochanteric fracture. Patient is very hard of hearing.     Review of Systems   Unable to perform ROS: Mental status change   Patient seen in Pre-op and had been given Fentanyl prior to my arrival.    Objective:     Vital Signs (Most Recent):  Temp: 97.6 °F (36.4 °C) (10/26/17 1344)  Pulse: 91 (10/26/17 1527)  Resp: 15 (10/26/17 1527)  BP: 109/62 (10/26/17 1527)  SpO2: 96 % (10/26/17 1527) Vital Signs (24h Range):  Temp:  [96.6 °F (35.9 °C)-97.9 °F (36.6 °C)] 97.6 °F (36.4 °C)  Pulse:  [] 91  Resp:  [15-26] 15  SpO2:  [93 %-100 %] 96 %  BP: ()/(56-73) 109/62     Weight: 43.1 kg (95 lb)  Body mass index is 19.19 kg/m².    Intake/Output Summary (Last 24 hours) at 10/26/17 1556  Last data filed at 10/26/17 0900   Gross per 24 hour   Intake                0 ml   Output              250 ml   Net             -250 ml      Physical Exam   Constitutional: No distress.   Thin and frail  female   HENT:   Mouth/Throat: Oropharynx is clear and moist.   Eyes: Conjunctivae are normal.   Neck: Neck supple. No JVD present.   Cardiovascular: Normal rate, regular rhythm and normal heart sounds.  Exam reveals no gallop and no friction rub.    No murmur heard.  Pulmonary/Chest: Effort normal and breath sounds normal. She has no wheezes.   Abdominal: Soft. Bowel sounds are normal. She exhibits no distension. There is no tenderness.   Musculoskeletal: She exhibits no edema or deformity.   Neurological: She is alert.   Skin: Skin is warm. No erythema.   Psychiatric: She has a normal mood and affect.       Significant Labs:   CBC:   Recent Labs  Lab 10/25/17  1702 10/26/17  0433   WBC 8.47 15.36*   HGB 12.3 11.3*   HCT 39.0 35.0*    300     CMP:   Recent Labs  Lab 10/25/17  1702 10/26/17  0433    135*   K 3.5 4.3   CL 95 96   CO2 34* 27   GLU 66* 125*   BUN 19 23   CREATININE 0.8 1.0   CALCIUM 10.2 10.4   PROT  --  6.4   ALBUMIN   --  3.0*   BILITOT  --  0.6   ALKPHOS  --  96   AST  --  25   ALT  --  13   ANIONGAP 9 12   EGFRNONAA >60.0 49.7*     Prealbumin:   Recent Labs  Lab 10/26/17  0013   PREALBUMIN 21     Urine Studies:   Recent Labs  Lab 10/26/17  1055   COLORU Yellow   APPEARANCEUA Cloudy*   PHUR 6.0   SPECGRAV 1.015   PROTEINUA 2+*   GLUCUA Negative   KETONESU Negative   BILIRUBINUA Negative   OCCULTUA 3+*   NITRITE Negative   UROBILINOGEN Negative   LEUKOCYTESUR 3+*   RBCUA 91*   WBCUA 98*   BACTERIA Moderate*   SQUAMEPITHEL 1   HYALINECASTS 2*     Lab Results   Component Value Date    [id number] 48 10/26/2017     Significant Imaging: I have reviewed all pertinent imaging results/findings within the past 24 hours.    Assessment/Plan:      * Closed displaced intertrochanteric fracture of left femur with routine healing    Patient is day of surgery for surgical repair of left hip fracture. Patient reports no pain in left hip but just received nerve block to left leg by anesthesia. Patient to go to OR today by Orthopedics for operative repair of hip fracture. Patient will start PT/OT in the am tomorrow for gait training and strengthening and restoration of ADL's. Patient is NWB: left lower extremity, as per Orthopedic recommendations. Plan is to start Lovenox 40 mg subcutaneous daily 12 hours post-op and continue LULÚ/SCDs for DVT prophylaxis for now. Perineural pain catheter in place for pain control and being managed by Anesthesia Pain Service. Continue Bean to gravity and remove on POD #1. Continue IVF's until patient tolerating oral diet > 4 hours then will discontinue IVF's and saline lock IV.         Acute cystitis with hematuria    Patient on admit U/A with 98 WBCs and moderate bacteria and negative nitrite consistent with likely UTI. Will send urine for urine culture and start Rocephin 1 gram IV daily to treat and will need 3 days of antibiotics. Patient with no signs to suggest sepsis so okay to proceed to hip fracture  surgery today.           Osteoporosis, post-menopausal    · Patient admitted with hip fracture related to osteoporosis. Patient will need outpatient DEXA scan and further evaluation for additional treatment of osteoporosis. Will refer patient to [facility] on discharge for further evaluation and management of osteoporosis.    · Continue Calcium and Vitamin D to treat.   · Vitamin D level is adequate at 48 on this admit so no additional Vitamin D replacement needed.           Tobacco abuse    · Patient smaokes 1PPD x 60 years of cigarettes.  · Nicotine patch 21 mg daily while hospitalized.   · Incentive spirometry post-op to prevent post-op pulmonary complications in known active smoker.           Mixed hyperlipidemia    · Controlled.   · Continue Simvastatin to treat as patient taking at home.           Gastroesophageal reflux disease without esophagitis    · Controlled. Continue Protonix 40 mg po daily to treat in the hospital.           Constipation due to pain medication    Patient with known chronic constipation. Will place on daily Senakot and Miralax to treat.         Poor appetite    Prealbumin 21 and adequate. Will start Boost with meals to help with nutrition.         Closed fracture of base of fifth metatarsal bone    Orthopedics evaluated. No surgical intervention needed. Pain control.                VTE Risk Mitigation         Ordered     Place LULÚ hose  Until discontinued      10/25/17 2010     High Risk of VTE  Once      10/25/17 1944     Medium Risk of VTE  Once      10/25/17 1944              Elma Harris MD  Department of Hospital Medicine   [facility]

## 2017-10-27 PROBLEM — D62 ACUTE BLOOD LOSS AS CAUSE OF POSTOPERATIVE ANEMIA: Status: ACTIVE | Noted: 2017-10-27

## 2017-10-27 PROBLEM — S72.002D ENCOUNTER FOR AFTERCARE FOR HEALING CLOSED TRAUMATIC FRACTURE OF LEFT HIP: Status: ACTIVE | Noted: 2017-10-27

## 2017-10-27 LAB
ALBUMIN SERPL BCP-MCNC: 2.4 G/DL
ALP SERPL-CCNC: 83 U/L
ALT SERPL W/O P-5'-P-CCNC: 18 U/L
ANION GAP SERPL CALC-SCNC: 10 MMOL/L
AST SERPL-CCNC: 36 U/L
BACTERIA UR CULT: NO GROWTH
BASOPHILS # BLD AUTO: 0.02 K/UL
BASOPHILS NFR BLD: 0.1 %
BILIRUB SERPL-MCNC: 0.3 MG/DL
BUN SERPL-MCNC: 29 MG/DL
CALCIUM SERPL-MCNC: 8.6 MG/DL
CHLORIDE SERPL-SCNC: 102 MMOL/L
CO2 SERPL-SCNC: 26 MMOL/L
CREAT SERPL-MCNC: 1.3 MG/DL
DIFFERENTIAL METHOD: ABNORMAL
EOSINOPHIL # BLD AUTO: 0 K/UL
EOSINOPHIL NFR BLD: 0.2 %
ERYTHROCYTE [DISTWIDTH] IN BLOOD BY AUTOMATED COUNT: 14.1 %
EST. GFR  (AFRICAN AMERICAN): 41.7 ML/MIN/1.73 M^2
EST. GFR  (NON AFRICAN AMERICAN): 36.2 ML/MIN/1.73 M^2
GLUCOSE SERPL-MCNC: 115 MG/DL
HCT VFR BLD AUTO: 24.3 %
HGB BLD-MCNC: 7.6 G/DL
IMM GRANULOCYTES # BLD AUTO: 0.08 K/UL
IMM GRANULOCYTES NFR BLD AUTO: 0.6 %
LYMPHOCYTES # BLD AUTO: 1.8 K/UL
LYMPHOCYTES NFR BLD: 13 %
MAGNESIUM SERPL-MCNC: 1.9 MG/DL
MCH RBC QN AUTO: 31.5 PG
MCHC RBC AUTO-ENTMCNC: 31.3 G/DL
MCV RBC AUTO: 101 FL
MONOCYTES # BLD AUTO: 1.3 K/UL
MONOCYTES NFR BLD: 9.3 %
NEUTROPHILS # BLD AUTO: 10.6 K/UL
NEUTROPHILS NFR BLD: 76.8 %
NRBC BLD-RTO: 0 /100 WBC
PHOSPHATE SERPL-MCNC: 4.1 MG/DL
PLATELET # BLD AUTO: 185 K/UL
PMV BLD AUTO: 9.9 FL
POTASSIUM SERPL-SCNC: 4.1 MMOL/L
PROT SERPL-MCNC: 5.3 G/DL
RBC # BLD AUTO: 2.41 M/UL
SODIUM SERPL-SCNC: 138 MMOL/L
WBC # BLD AUTO: 13.74 K/UL

## 2017-10-27 PROCEDURE — 99231 SBSQ HOSP IP/OBS SF/LOW 25: CPT | Mod: ,,, | Performed by: ANESTHESIOLOGY

## 2017-10-27 PROCEDURE — 85025 COMPLETE CBC W/AUTO DIFF WBC: CPT

## 2017-10-27 PROCEDURE — 63600175 PHARM REV CODE 636 W HCPCS: Performed by: STUDENT IN AN ORGANIZED HEALTH CARE EDUCATION/TRAINING PROGRAM

## 2017-10-27 PROCEDURE — 97165 OT EVAL LOW COMPLEX 30 MIN: CPT

## 2017-10-27 PROCEDURE — 86580 TB INTRADERMAL TEST: CPT | Performed by: INTERNAL MEDICINE

## 2017-10-27 PROCEDURE — 99232 SBSQ HOSP IP/OBS MODERATE 35: CPT | Mod: ,,, | Performed by: INTERNAL MEDICINE

## 2017-10-27 PROCEDURE — 11000001 HC ACUTE MED/SURG PRIVATE ROOM

## 2017-10-27 PROCEDURE — S4991 NICOTINE PATCH NONLEGEND: HCPCS | Performed by: STUDENT IN AN ORGANIZED HEALTH CARE EDUCATION/TRAINING PROGRAM

## 2017-10-27 PROCEDURE — 36415 COLL VENOUS BLD VENIPUNCTURE: CPT

## 2017-10-27 PROCEDURE — 25000003 PHARM REV CODE 250: Performed by: STUDENT IN AN ORGANIZED HEALTH CARE EDUCATION/TRAINING PROGRAM

## 2017-10-27 PROCEDURE — 80053 COMPREHEN METABOLIC PANEL: CPT

## 2017-10-27 PROCEDURE — 25000003 PHARM REV CODE 250: Performed by: HOSPITALIST

## 2017-10-27 PROCEDURE — 97161 PT EVAL LOW COMPLEX 20 MIN: CPT

## 2017-10-27 PROCEDURE — 63600175 PHARM REV CODE 636 W HCPCS: Performed by: INTERNAL MEDICINE

## 2017-10-27 PROCEDURE — 25000003 PHARM REV CODE 250: Performed by: ANESTHESIOLOGY

## 2017-10-27 PROCEDURE — 83735 ASSAY OF MAGNESIUM: CPT

## 2017-10-27 PROCEDURE — 84100 ASSAY OF PHOSPHORUS: CPT

## 2017-10-27 PROCEDURE — 25000003 PHARM REV CODE 250: Performed by: INTERNAL MEDICINE

## 2017-10-27 RX ORDER — TRAMADOL HYDROCHLORIDE 50 MG/1
50 TABLET ORAL EVERY 6 HOURS PRN
Status: DISCONTINUED | OUTPATIENT
Start: 2017-10-27 | End: 2017-10-30 | Stop reason: HOSPADM

## 2017-10-27 RX ADMIN — TUBERCULIN PURIFIED PROTEIN DERIVATIVE 5 UNITS: 5 INJECTION, SOLUTION INTRADERMAL at 11:10

## 2017-10-27 RX ADMIN — ENOXAPARIN SODIUM 40 MG: 100 INJECTION SUBCUTANEOUS at 04:10

## 2017-10-27 RX ADMIN — ACETAMINOPHEN 1000 MG: 500 TABLET ORAL at 02:10

## 2017-10-27 RX ADMIN — CEFAZOLIN SODIUM 2 G: 2 SOLUTION INTRAVENOUS at 02:10

## 2017-10-27 RX ADMIN — SODIUM CHLORIDE 500 ML: 900 INJECTION, SOLUTION INTRAVENOUS at 01:10

## 2017-10-27 RX ADMIN — OYSTER SHELL CALCIUM WITH VITAMIN D 1 TABLET: 500; 200 TABLET, FILM COATED ORAL at 10:10

## 2017-10-27 RX ADMIN — PANTOPRAZOLE SODIUM 40 MG: 40 TABLET, DELAYED RELEASE ORAL at 10:10

## 2017-10-27 RX ADMIN — PREGABALIN 75 MG: 75 CAPSULE ORAL at 11:10

## 2017-10-27 RX ADMIN — NICOTINE 1 PATCH: 14 PATCH, EXTENDED RELEASE TRANSDERMAL at 10:10

## 2017-10-27 RX ADMIN — POLYMYXIN B SULFATE, TRIMETHOPRIM SULFATE 1 DROP: 10000; 1 SOLUTION/ DROPS OPHTHALMIC at 02:10

## 2017-10-27 RX ADMIN — STANDARDIZED SENNA CONCENTRATE AND DOCUSATE SODIUM 1 TABLET: 8.6; 5 TABLET, FILM COATED ORAL at 10:10

## 2017-10-27 RX ADMIN — ACETAMINOPHEN 1000 MG: 500 TABLET ORAL at 05:10

## 2017-10-27 RX ADMIN — STANDARDIZED SENNA CONCENTRATE AND DOCUSATE SODIUM 1 TABLET: 8.6; 5 TABLET, FILM COATED ORAL at 11:10

## 2017-10-27 RX ADMIN — POLYETHYLENE GLYCOL 3350 17 G: 17 POWDER, FOR SOLUTION ORAL at 10:10

## 2017-10-27 RX ADMIN — OYSTER SHELL CALCIUM WITH VITAMIN D 1 TABLET: 500; 200 TABLET, FILM COATED ORAL at 11:10

## 2017-10-27 RX ADMIN — POLYMYXIN B SULFATE, TRIMETHOPRIM SULFATE 1 DROP: 10000; 1 SOLUTION/ DROPS OPHTHALMIC at 11:10

## 2017-10-27 RX ADMIN — ACETAMINOPHEN 1000 MG: 500 TABLET ORAL at 11:10

## 2017-10-27 RX ADMIN — SIMVASTATIN 40 MG: 20 TABLET, FILM COATED ORAL at 11:10

## 2017-10-27 RX ADMIN — CEFTRIAXONE 1 G: 1 INJECTION, SOLUTION INTRAVENOUS at 04:10

## 2017-10-27 RX ADMIN — POLYMYXIN B SULFATE, TRIMETHOPRIM SULFATE 1 DROP: 10000; 1 SOLUTION/ DROPS OPHTHALMIC at 05:10

## 2017-10-27 RX ADMIN — CEFAZOLIN SODIUM 2 G: 2 SOLUTION INTRAVENOUS at 05:10

## 2017-10-27 RX ADMIN — SODIUM CHLORIDE 1000 ML: 0.9 INJECTION, SOLUTION INTRAVENOUS at 12:10

## 2017-10-27 RX ADMIN — ROPIVACAINE HYDROCHLORIDE 10 ML/HR: 2 INJECTION, SOLUTION EPIDURAL; INFILTRATION at 04:10

## 2017-10-27 RX ADMIN — TRAMADOL HYDROCHLORIDE 50 MG: 50 TABLET, FILM COATED ORAL at 09:10

## 2017-10-27 NOTE — PLAN OF CARE
Problem: Physical Therapy Goal  Goal: Physical Therapy Goal  Goals to be met by: 17     Patient will increase functional independence with mobility by performin. Supine to sit with MInimal Assistance  2. Sit to supine with MInimal Assistance  3. Sit to stand transfer with Minimal Assistance  4. Bed to chair transfer with Minimal Assistance using Rolling Walker  5. Gait  x 15 feet with Minimal Assistance using Rolling Walker.   6. Lower extremity exercise program x 30 reps per handout, with independence    Outcome: Ongoing (interventions implemented as appropriate)  PT eval complete and POC initiated.

## 2017-10-27 NOTE — ASSESSMENT & PLAN NOTE
On admission to hospital, patient was noted to have Hgb of 11.3. After surgery patient's Hgb level has dropped to 7.6 and expected blood loss related to surgery and hip fracture. Patient has no signs of active bleeding and hemodynamically stable. Patient is asymptomatic. Goal is keep Hgb >7 as patient with no prior significant cardiac history. Monitor daily H/H post-op.

## 2017-10-27 NOTE — PT/OT/SLP EVAL
Physical Therapy   Evaluation    Simona Bassett   MRN: [id number]     PT Received On: 10/27/17  PT Start Time: 1020  PT Stop Time: 1035  PT Total Time (min): 15 min    Billable Minutes:  Evaluation 15    Diagnosis: Closed displaced intertrochanteric fracture of left femur with routine healing    Past Medical History:   Diagnosis Date    Arthritis     Cataract     Dysphagia, unspecified(787.20)     Gastroesophageal reflux disease without esophagitis 10/2/2014    Glaucoma     Hyperlipidemia     MGUS (monoclonal gammopathy of unknown significance)     Mixed hyperlipidemia 10/23/2012    Osteoporosis      Past Surgical History:   Procedure Laterality Date    BACK SURGERY      CHOLECYSTECTOMY      EYE SURGERY Bilateral     CATARACT SURGERY    GALLBLADDER SURGERY      INTERTROCHANTERIC HIP FRACTURE SURGERY Left 10/26/2017    IM nail    SPINE SURGERY         Referring physician: Kimberly   Date referred to PT: 10/27/2017     General Precautions: Standard, fall  Orthopedic Precautions : LLE toe touch weight bearing  Braces: N/A    Do you have any cultural, spiritual, Jewish conflicts, given your current situation?: none    Patient History:  Lives With: spouse  Living Arrangements: house  Transportation Available: family or friend will provide  Living Environment Comment: Pt lives with her  and family in a H with no concerns.  Pt used RW for ambulation PTA.  History may need to be verified 2/2 possible patient confusion.    Equipment Currently Used at Home: walker, rolling    Previous Level of Function:  Ambulation Skills: needs device  Transfer Skills: needs device  ADL Skills: needs device    Subjective:  Communicated with RN prior to session.    Pt agreeable to evaluation, but lethargic throughout session.      Chief Complaint: none  Patient goals: to return home     Pain Rating 1: 0/10   Pain Rating Post-Intervention 1: 0/10    Objective:  Patient found supine in bed     Patient found with:  peripheral IV, perineural catheter, FCD    Cognitive Exam:  Oriented to: Person, Place, Time and Situation  Follows Commands/attention: Follows multistep  commands  Communication: clear/fluent  Safety awareness/insight to disability: impaired    Physical Exam:  Postural examination/scapula alignment: Rounded shoulder    Skin integrity: Visible skin intact  Edema: None noted     Sensation:   Intact    Lower Extremity Range of Motion:  Right Lower Extremity: WFL  Left Lower Extremity: WFL except hip    Lower Extremity Strength:  Right Lower Extremity: WFL  Left Lower Extremity: WFL except hip    Functional Mobility:    Bed Mobility:    Scooting/Bridging: Maximum Assistance (to EOB)  Supine to Sit: Maximum Assistance    Transfers:    Sit <> Stand Assistance: Maximum Assistance  Sit <> Stand Assistive Device: No Assistive Device  Bed <> Chair Technique: Stand Pivot  Bed <> Chair Assistance: Maximum Assistance  Bed <> Chair Assistive Device: No Assistive Device    Ambulation:    Gait Distance: unable to perform today.     Balance:   Static Sit: FAIR: Maintains without assist, but unable to take any challenges   Dynamic Sit:  FAIR: Cannot move trunk without losing balance  Static Stand: 0: Needs MAXIMAL assist to maintain   Dynamic stand: 0: N/A    Therapeutic Activities and Exercises:  PT evaluation performed.  White board updated.  Pt educated on role of PT, safety with functional mobility.     Pt safe to transfer with RN staff    Patient left up in chair with all lines intact, call button in reach and RN notified.    AM-PAC 6 CLICK MOBILITY  1 = Unable, Total/Dependent Assistance  2 = A lot, Maximum/Moderate Assistance  3 = A little, Minimum/Contact Guard/Supervision  4 = None, Modified [place]/Independent    Turning over in bed (including adjusting bedclothes, sheets and blankets)?: 2  Sitting down on and standing up from a chair with arms (e.g., wheelchair, bedside commode, etc.): 2  Moving from lying on back to  sitting on the side of the bed?: 2  Moving to and from a bed to a chair (including a wheelchair)?: 2  Need to walk in hospital room?: 1  Climbing 3-5 steps with a railing?: 1  Total Score: 10    AM-PAC Raw Score   CMS G-Code Modifier   Level of Impairment   Assistance     6   CN   100%         Total / Unable   7 - 9   CM   80 - 100%   Maximal Assist     10 - 14   CL   60 - 80%   Moderate Assist     15 - 19   CK   40 - 60%   Moderate Assist     20 - 22   CJ   20 - 40%   Minimal Assist     23   CI   1-20%         SBA / CGA     24 CH   0%   Independent/Modified Independent       Assessment:  Simona Bassett is a 89 y.o. female with a medical diagnosis of Closed displaced intertrochanteric fracture of left femur with routine healing. She presents with deficits listed below.  Pt tolerated treatment fairly today.  Pt is a good candidate for skilled PT services to address the below deficits and to increase functional independence.      Rehab identified problem list/impairments: weakness, impaired endurance, gait instability, impaired functional mobilty, impaired balance, impaired self care skills, decreased lower extremity function, decreased upper extremity function, visual deficits, impaired cognition, decreased safety awareness, decreased ROM, impaired skin, edema, orthopedic precautions    Rehab potential is fair.    Activity tolerance: Fair    Discharge Facility/Level Of Care Needs: nursing facility, skilled    Barriers to Discharge: Decreased caregiver support    Equipment Needed After Discharge:  (TBD at next level of care)    GOALS:    Physical Therapy Goals        Problem: Physical Therapy Goal    Goal Priority Disciplines Outcome Goal Variances Interventions   Physical Therapy Goal     PT/OT, PT Ongoing (interventions implemented as appropriate)     Description:  Goals to be met by: 17     Patient will increase functional independence with mobility by performin. Supine to sit with MInimal  Assistance  2. Sit to supine with MInimal Assistance  3. Sit to stand transfer with Minimal Assistance  4. Bed to chair transfer with Minimal Assistance using Rolling Walker  5. Gait  x 15 feet with Minimal Assistance using Rolling Walker.   6. Lower extremity exercise program x 30 reps per handout, with independence                      PLAN:    Patient to be seen daily to address the above listed problems via gait training, therapeutic activities, therapeutic exercises, neuromuscular re-education  Plan of Care Expires: 11/26/17  Plan of Care reviewed with: patient    Erik Kang PT, DPT  10/27/2017   [phone]

## 2017-10-27 NOTE — PLAN OF CARE
10:52 AM  SW received notification that pt will need SNF at discharge. Called daughter who provided three SNF preferences. 1. [facility] 2. [facility] 3. [facility]. Faxed referrals via [facility]. CM placed consult to [facility].    Xenia Moreno LMSW   [facility]  Ext [phone]

## 2017-10-27 NOTE — PROGRESS NOTES
Orthopaedic Surgery  Post-op Note      Subjective:  Patient seen at bedside  Pain Controlled - PNC in place  Hypotensive issues overnight, treated with fluid boluses  Drowsy this AM - anesthesia at bedside evaluating patient.      Objective:  Temp:  [96.6 °F (35.9 °C)-98.6 °F (37 °C)] 98.6 °F (37 °C)  Pulse:  [] 94  Resp:  [15-20] 20  SpO2:  [92 %-100 %] 92 %  BP: ()/(44-96) 95/46    PE:    AA&O x 4.  NAD  HEENT:  NCAT, sclera nonicteric  Lungs:  Respirations are equal and unlabored.  CV:  2+ bilateral upper and lower extremity pulses.  Skin:  Intact throughout.    MS -    Dressings C/D/I  Motor intact to LLE  SILT throughout  Distal pulses 2+      A/P: 89 y.o. female s/p left CMN for intertrochanteric femur fracture    Continue close medical management given hypotensive episodes  PT/OT as tolerated  Dc bianchi this am if tolerated - will defer to primary team  Postop abx in place  Lovenox 40mg qday for DVT    Dispo: Stable from orthopaedic management perspective. Appreciate medical management.       Tristan Vaughn MD  Orthopaedic Surgery Resident  [facility] [place]

## 2017-10-27 NOTE — PROGRESS NOTES
[facility] Hospital Medicine  Progress Note    Patient Name: Simona Bassett  MRN: [id number]  [facility] Medicine Service: H  Admission Date: 10/25/2017  Length of Stay: 2 days  Expected Discharge Date: 10/31/2017  Attending Physician: Elma Harris MD  Primary Care Provider: Twila Loera MD    Subjective:     1 Day Post-Op from left hip cephalomedullary nail fixation for fracture     Orthopedic Surgeon: Dr. Isiah Quinonez  Weight Bearing Status: TTWB left lower extremity    Follow-up Visit For: Closed displaced intertrochanteric fracture of left femur with routine healing    HPI:  Ms. Simona Bassett is a 89 y/o female with osteoporosis, GERD, HLP and recent left ankle fracture who presents to the ED after she had a fall in her house today. The history is obtained by the daughter as the patient is hard of hearing. The daughter mentions that she was in her bedroom with her hands off of the walker trying to get her diapers, when she lost her balance and fell on her left hip and left elbow. After the fall, she was unable to ambulate. She normally ambulates around the house using a walker.  The patient claims she didn't hit her head when she fell. She does take Aspirin 81mg daily, last dose was on 10/24. She denies any numbness or tingling in her left leg. X-rays done in ER revealed nondisplaced fracture base of fifth metatarsal on left foot and comminuted impacted intertrochanteric fracture of the left femur.    Interval History: Patient this am had recorded BP 74/43 while sitting up in bedside chair. I was bedside at that time and patient was awake and alert and denied any dizziness or lightheadedness and asymptomatic. Cuff size was changed to smaller BP cuff as patient has very thin arms and with smaller cuff patient's BP was noted to be 111/51. Patient was also noted at that time on pulse ox to have room air sats of 81% but patient has nail polish on and when pulse ox checked on her ear  was 98% on 2 liters. Patient denied any chest pain or SOB or difficulty breathing. Patient denies any left hip pain s/p IM nail in OR yesterday for intertrochanteric fracture.     Review of Systems   Constitutional: Negative for fever.   HENT: Positive for hearing loss (Patient very hard of hearing and uses hearing aids).    Respiratory: Negative for cough, chest tightness and shortness of breath.    Cardiovascular: Negative for chest pain and leg swelling.   Gastrointestinal: Negative for abdominal pain, nausea and vomiting.   Musculoskeletal: Positive for back pain. Negative for arthralgias.   Skin: Negative for rash.   Neurological: Negative for dizziness and light-headedness.   Psychiatric/Behavioral: Negative for confusion and hallucinations.     Objective:     Vital Signs (Most Recent):  Temp: 97.6 °F (36.4 °C) (10/27/17 1146)  Pulse: 84 (10/27/17 1146)  Resp: 18 (10/27/17 1220)  BP: 111/51 (10/27/17 1220)  SpO2: 98 % on 2 liters of oxygen(10/27/17 1220) Vital Signs (24h Range):  Temp:  [96.6 °F (35.9 °C)-98.6 °F (37 °C)] 97.6 °F (36.4 °C)  Pulse:  [] 84  Resp:  [15-20] 18  SpO2:  [81 %-100 %] 84 %  BP: ()/(43-96) 111/51     Weight: 43.1 kg (95 lb)  Body mass index is 19.19 kg/m².    Intake/Output Summary (Last 24 hours) at 10/27/17 1248  Last data filed at 10/27/17 0500   Gross per 24 hour   Intake             1465 ml   Output              275 ml   Net             1190 ml      Physical Exam   Constitutional: No distress.   Thin and frail elderly female   HENT:   Mouth/Throat: Oropharynx is clear and moist.   Eyes: Conjunctivae are normal.   Neck: Neck supple.   Cardiovascular: Normal rate, regular rhythm and normal heart sounds.  Exam reveals no gallop and no friction rub.    No murmur heard.  Pulmonary/Chest: Effort normal and breath sounds normal. She has no wheezes.   Abdominal: Soft. Bowel sounds are normal. She exhibits no distension. There is no tenderness.   Musculoskeletal: She exhibits no  edema.   Neurological: She is alert.   Oriented to self and place but not time   Skin: Skin is warm. No erythema.   Surgical bandage in place to left hip    Psychiatric: She has a normal mood and affect. Her behavior is normal.       Significant Labs:   CBC:   Recent Labs  Lab 10/26/17  0433 10/26/17  1917 10/27/17  0513   WBC 15.36* 12.64 13.74*   HGB 11.3* 8.6* 7.6*   HCT 35.0* 27.5* 24.3*    222 185     CMP:   Recent Labs  Lab 10/25/17  1702 10/26/17  0433 10/27/17  0513    135* 138   K 3.5 4.3 4.1   CL 95 96 102   CO2 34* 27 26   GLU 66* 125* 115*   BUN 19 23 29*   CREATININE 0.8 1.0 1.3   CALCIUM 10.2 10.4 8.6*   PROT  --  6.4 5.3*   ALBUMIN  --  3.0* 2.4*   BILITOT  --  0.6 0.3   ALKPHOS  --  96 83   AST  --  25 36   ALT  --  13 18   ANIONGAP 9 12 10   EGFRNONAA >60.0 49.7* 36.2*     Lab Results   Component Value Date    [id number] 48 10/26/2017      Significant Imaging: I have reviewed all pertinent imaging results/findings within the past 24 hours.    ASSESSMENT/PLAN:     * Closed displaced intertrochanteric fracture of left femur with routine healing s/p IM tayo on 10/26/2017    Encounter for aftercare for healing closed traumatic fracture of left hip  Patient is POD # 1. Patient reports no pain in left hip. Plan is to start PT/OT for gait training and strengthening and restoration of ADL's. Patient is TTWB: left lower extremity as per Orthopedic recommendations. Plan is to start Lovenox 40 mg subcutaneous daily and will need a total of 4 weeks post-op after hip fracture surgery and LULÚ/SCDs for DVT prophylaxis. Orthopedics is following and managing hip fracture and surgical site. Perineural pain catheter in place for pain control and being managed by Anesthesia Pain Service. Skilled nursing consult placed as patient will likely need skilled care upon discharge from the hospital and  notified. Discontinue Bean to gravity.         Acute blood loss as cause of postoperative anemia     On admission to hospital, patient was noted to have Hgb of 11.3. After surgery patient's Hgb level has dropped to 7.6 and expected blood loss related to surgery and hip fracture. Patient has no signs of active bleeding and hemodynamically stable. Patient is asymptomatic. Goal is keep Hgb >7 as patient with no prior significant cardiac history. Monitor daily H/H post-op.         Acute cystitis with hematuria    Patient on admit U/A with 98 WBCs and moderate bacteria and negative nitrite consistent with likely UTI. Awaiting urine culture results and will continue Rocephin 1 gram IV daily to treat and will need 3 days of antibiotics. Patient with no signs to suggest sepsis.          Osteoporosis, post-menopausal    · Patient admitted with hip fracture related to osteoporosis. Patient will need outpatient DEXA scan and further evaluation for additional treatment of osteoporosis. Will refer patient to [facility] on discharge for further evaluation and management of osteoporosis.    · Continue Calcium and Vitamin D to treat.   · Vitamin D level is adequate at 48 on this admit so no additional Vitamin D replacement needed.           Mixed hyperlipidemia    · Controlled.   · Continue Simvastatin to treat as patient taking at home.           Gastroesophageal reflux disease without esophagitis    · Controlled. Continue Protonix 40 mg po daily to treat in the hospital.           Tobacco abuse    · Patient smaokes 1PPD x 60 years of cigarettes.  · Nicotine patch 21 mg daily while hospitalized.   · Incentive spirometry post-op to prevent post-op pulmonary complications in known active smoker.           Constipation due to pain medication    Patient with known chronic constipation. Will place on daily Senakot and Miralax to treat.         Poor appetite    Prealbumin 21 and adequate. Will start Boost with meals to help with nutrition.         Closed fracture of base of fifth metatarsal bone    Orthopedics  evaluated. No surgical intervention needed. Pain control.                  Anticipated Disposition: Skilled Nursing Facility    Time spent in care of patient (Greater than 1/2 spent in direct face-to-face contact): 20 minutes    Elma Harris MD  Department of Hospital Medicine   [facility]

## 2017-10-27 NOTE — SIGNIFICANT EVENT
Critical Care Outreach (CORE)  Critical Care Medicine  Consult Note      CORE Metrics:     Admit Date: 10/25/2017  LOS: 2  Code Status: Full Code   CC: Artificial Intelligence Rapid Response Alert  Date of Consult: 10/27/2017  : 10/1/1927  Age: 89 y.o.  Weight:   Wt Readings from Last 1 Encounters:   10/25/17 43.1 kg (95 lb)     Race:   Sex: female  Bed: [address] [place] [address] B:   MRN: [id number]  RRT Indication(s): Artificial Intelligence Rapid Response Alert  Time CORE Call Received: 12:29  Time CORE at Bedside: 12:31  Was the patient discharged from an ICU this admission? No   Was the patient discharged from a PACU within last 24 hours? Yes  Did the patient receive conscious sedation/general anesthesia within last 24 hours? Yes  Was the patient in the ED within the past 24 hours? No  Was the patient started on NIPPV within the past 24 hours? N  Did this progress into an ARC or CPA: No  Attending Physician: Elma Harris MD  Primary Service: [facility]   Illness Category: Medical Non-Cardiac  Disposition: Stay on floor    No care rendered or requested. Pt resting comfortably. Pulse oximetry recorded incorrectly, blood pressure cuff size changed to appropriate size. Normotensive, on room air with SpO2 97%. Will sign off.

## 2017-10-27 NOTE — PT/OT/SLP EVAL
Occupational Therapy  Evaluation    Simona Bassett   MRN: [id number]   Admitting Diagnosis: Closed displaced intertrochanteric fracture of left femur with routine healing    OT Date of Treatment: 10/27/17   OT Start Time: 1020  OT Stop Time: 1035  OT Total Time (min): 15 min    Billable Minutes:  Evaluation 15    Diagnosis: Closed displaced intertrochanteric fracture of left femur with routine healing   S/p IMN L femur POD1    Past Medical History:   Diagnosis Date    Arthritis     Cataract     Dysphagia, unspecified(787.20)     Gastroesophageal reflux disease without esophagitis 10/2/2014    Glaucoma     Hyperlipidemia     MGUS (monoclonal gammopathy of unknown significance)     Mixed hyperlipidemia 10/23/2012    Osteoporosis       Past Surgical History:   Procedure Laterality Date    BACK SURGERY      CHOLECYSTECTOMY      EYE SURGERY Bilateral     CATARACT SURGERY    GALLBLADDER SURGERY      INTERTROCHANTERIC HIP FRACTURE SURGERY Left 10/26/2017    IM nail    SPINE SURGERY         General Precautions: Standard, fall  Orthopedic Precautions: LLE toe touch weight bearing     Patient History:  Living Environment  Living Environment Comment: Pt lives with her  and family in a [facility] with no concerns.  Pt used RW for ambulation PTA.  History may need to be verified 2/2 possible patient confusion  Equipment Currently Used at Home: walker, rolling    Prior level of function:   Bed Mobility/Transfers: needs device  Grooming: independent  Bathing: needs device  Upper Body Dressing: independent  Lower Body Dressing: independent  Toileting: independent    Subjective:  Communicated with RN prior to session.    Pt agreeable to Evaluation, lethargic throughout    Pain/Comfort  Pain Rating 1: 0/10    Objective:  Patient found with: peripheral IV, perineural catheter, FCD    Upper Extremity Range of Motion:  Right Upper Extremity: WFL  Left Upper Extremity: WFL    Upper Extremity Strength:  Right Upper  "Extremity: 4-/5  Left Upper Extremity: 4-/5    Functional Mobility:  Bed Mobility:  Scooting/Bridging: Maximum Assistance  Supine to Sit: Maximum Assistance    Transfers:  Sit <> Stand Assistance: Maximum Assistance  Sit <> Stand Assistive Device: No Assistive Device    Bed <> Chair Technique: Stand Pivot  Bed <> Chair Transfer Assistance: Maximum Assistance  Bed <> Chair Assistive Device: No Assistive Device    Activities of Daily Living:  UE Dressing Level of Assistance: Moderate assistance    LE Dressing Level of Assistance: Total assistance    Balance:   Static Sit: FAIR-: Maintains without assist but inconsistent   Dynamic Sit: FAIR: Cannot move trunk without losing balance  Static Stand: 0: Needs MAXIMAL assist to maintain   Dynamic stand: 0: N/A    AM-PAC 6 CLICK ADL  How much help from another person does this patient currently need?  1 = Unable, Total/Dependent Assistance  2 = A lot, Maximum/Moderate Assistance  3 = A little, Minimum/Contact Guard/Supervision  4 = None, Modified [place]/Independent    Putting on and taking off regular lower body clothing? : 1  Bathing (including washing, rinsing, drying)?: 1  Toileting, which includes using toilet, bedpan, or urinal? : 2  Putting on and taking off regular upper body clothing?: 2  Taking care of personal grooming such as brushing teeth?: 3  Eating meals?: 3  Total Score: 12    AM-PAC Raw Score CMS "G-Code Modifier Level of Impairment Assistance   6 % Total / Unable   7 - 9 CM 80 - 100% Maximal Assist   10-14 CL 60 - 80% Moderate Assist   15 - 19 CK 40 - 60% Moderate Assist   20 - 22 CJ 20 - 40% Minimal Assist   23 CI 1-20% SBA / CGA   24 CH 0% Independent/ Mod I       Patient left up in chair with all lines intact, call button in reach and RN notified    Assessment:  Simona Bassett is a 89 y.o. female with a medical diagnosis of Closed displaced intertrochanteric fracture of left femur with routine healing and presents with decreased function " of L LE impeding her ability to perform ADLs and functional mobility and would benefit from OT services to maximize functional (I) and safety.    Rehab identified problem list/impairments: Rehab identified problem list/impairments: weakness, impaired endurance, impaired self care skills, impaired functional mobilty, gait instability, impaired balance, decreased lower extremity function, decreased safety awareness, pain, impaired skin, edema, orthopedic precautions    Rehab potential is good.    Activity tolerance: Good    Discharge recommendations: Discharge Facility/Level Of Care Needs: nursing facility, skilled     Barriers to discharge: Barriers to Discharge: Decreased caregiver support    Equipment recommendations:  (TBD)     GOALS:    Occupational Therapy Goals        Problem: Occupational Therapy Goal    Goal Priority Disciplines Outcome Interventions   Occupational Therapy Goal     OT, PT/OT     Description:  Goals to be met by: 10 days     Patient will increase functional independence with ADLs by performing:    UE Dressing with Stand-by Assistance.  LE Dressing with Moderate Assistance and Assistive Devices as needed.  Grooming while seated with Set-up Assistance.  Toileting from bedside commode with Moderate Assistance for hygiene and clothing management.   Stand pivot transfers with Minimal Assistance.  Toilet transfer to bedside commode with Minimal Assistance.                      PLAN:  Patient to be seen 6 x/week to address the above listed problems via self-care/home management, therapeutic activities, therapeutic exercises  Plan of Care reviewed with: patient    KARRIE Peterson  10/27/2017

## 2017-10-27 NOTE — TRANSFER OF CARE
"Anesthesia Transfer of Care Note    Patient: Simona Bassett    Procedure(s) Performed: Procedure(s) (LRB):  IM NAILING OF FEMUR - left - synthes - profx (Left)    Patient location: PACU    Anesthesia Type: general    Transport from OR: Transported from OR on 6-10 L/min O2 by face mask with adequate spontaneous ventilation    Post pain: adequate analgesia    Post assessment: no apparent anesthetic complications    Post vital signs: stable    Level of consciousness: awake, oriented and alert    Nausea/Vomiting: no nausea/vomiting    Complications: none    Transfer of care protocol was followed      Last vitals:   Visit Vitals  BP 95/69 (BP Location: Left arm, Patient Position: Lying)   Pulse 90   Temp 36.4 °C (97.6 °F) (Oral)   Resp 15   Ht 4' 11" (1.499 m)   Wt 43.1 kg (95 lb)   SpO2 97%   Breastfeeding? No   BMI 19.19 kg/m²     "

## 2017-10-27 NOTE — ANESTHESIA POST-OP PAIN MANAGEMENT
Acute Pain Service Progress Note    Simona Bassett is a 89 y.o., female, [id number].    Surgery:  IM NAILING OF FEMUR - left - synthes - profx (Left)    Post Op Day #: 1    Catheter type: perineural  SIFI    Infusion type: Ropivacaine 0.2%  10 basal    Problem List:    Active Hospital Problems    Diagnosis  POA    *Closed displaced intertrochanteric fracture of left femur with routine healing [S72.142D]  Not Applicable    Acute cystitis with hematuria [N30.01]  Yes    Poor appetite [R63.0]  Yes    Closed fracture of base of fifth metatarsal bone [S92.353A]  Yes    Constipation due to pain medication [K59.03]  Yes    Gastroesophageal reflux disease without esophagitis [K21.9]  Yes    Mixed hyperlipidemia [E78.2]  Yes    Osteoporosis, post-menopausal [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       Subjective:     General appearance of rouseable and responsive but sleepy   Pain with rest: 2    Faces   Pain with movement: 2    Faces   Side Effects    1. Pruritis No    2. Nausea No    3. Motor Blockade No, 0=Ability to raise lower extremities off bed    4. Sedation Yes, S=sleep, easy to arouse    Objective:        Catheter site clean, dry, intact      Vitals   Vitals:    10/27/17 0415   BP: (!) 95/46   Pulse: 94   Resp: 20   Temp: 37 °C (98.6 °F)        Labs    Admission on 10/25/2017   Component Date Value Ref Range Status    Sodium 10/25/2017 138  136 - 145 mmol/L Final    Potassium 10/25/2017 3.5  3.5 - 5.1 mmol/L Final    Chloride 10/25/2017 95  95 - 110 mmol/L Final    CO2 10/25/2017 34* 23 - 29 mmol/L Final    Glucose 10/25/2017 66* 70 - 110 mg/dL Final    BUN, Bld 10/25/2017 19  8 - 23 mg/dL Final    Creatinine 10/25/2017 0.8  0.5 - 1.4 mg/dL Final    Calcium 10/25/2017 10.2  8.7 - 10.5 mg/dL Final    Anion Gap 10/25/2017 9  8 - 16 mmol/L Final    eGFR if African American 10/25/2017 >60.0  >60  mL/min/1.73 m^2 Final    eGFR if non African American 10/25/2017 >60.0  >60 mL/min/1.73 m^2 Final    WBC 10/25/2017 8.47  3.90 - 12.70 K/uL Final    RBC 10/25/2017 4.06  4.00 - 5.40 M/uL Final    Hemoglobin 10/25/2017 12.3  12.0 - 16.0 g/dL Final    Hematocrit 10/25/2017 39.0  37.0 - 48.5 % Final    MCV 10/25/2017 96  82 - 98 fL Final    MCH 10/25/2017 30.3  27.0 - 31.0 pg Final    MCHC 10/25/2017 31.5* 32.0 - 36.0 g/dL Final    RDW 10/25/2017 13.8  11.5 - 14.5 % Final    Platelets 10/25/2017 321  150 - 350 K/uL Final    MPV 10/25/2017 9.7  9.2 - 12.9 fL Final    Immature Granulocytes 10/25/2017 0.2  0.0 - 0.5 % Final    Gran # 10/25/2017 5.2  1.8 - 7.7 K/uL Final    Immature Grans (Abs) 10/25/2017 0.02  0.00 - 0.04 K/uL Final    Lymph # 10/25/2017 1.9  1.0 - 4.8 K/uL Final    Mono # 10/25/2017 0.9  0.3 - 1.0 K/uL Final    Eos # 10/25/2017 0.5  0.0 - 0.5 K/uL Final    Baso # 10/25/2017 0.03  0.00 - 0.20 K/uL Final    nRBC 10/25/2017 0  0 /100 WBC Final    Gran% 10/25/2017 61.3  38.0 - 73.0 % Final    Lymph% 10/25/2017 22.4  18.0 - 48.0 % Final    Mono% 10/25/2017 10.3  4.0 - 15.0 % Final    Eosinophil% 10/25/2017 5.4  0.0 - 8.0 % Final    Basophil% 10/25/2017 0.4  0.0 - 1.9 % Final    Differential Method 10/25/2017 Automated   Final    Prothrombin Time 10/25/2017 9.6  9.0 - 12.5 sec Final    INR 10/25/2017 0.9  0.8 - 1.2 Final    Specimen UA 10/25/2017 Urine, Catheterized   Final    Color, UA 10/25/2017 Yellow  Yellow, Straw, Darline Final    Appearance, UA 10/25/2017 Clear  Clear Final    pH, UA 10/25/2017 7.0  5.0 - 8.0 Final    Specific Gravity, UA 10/25/2017 1.010  1.005 - 1.030 Final    Protein, UA 10/25/2017 Negative  Negative Final    Glucose, UA 10/25/2017 Negative  Negative Final    Ketones, UA 10/25/2017 Negative  Negative Final    Bilirubin (UA) 10/25/2017 Negative  Negative Final    Occult Blood UA 10/25/2017 1+* Negative Final    Nitrite, UA 10/25/2017 Negative   Negative Final    Urobilinogen, UA 10/25/2017 Negative  <2.0 EU/dL Final    Leukocytes, UA 10/25/2017 1+* Negative Final    RBC, UA 10/25/2017 14* 0 - 4 /hpf Final    WBC, UA 10/25/2017 7* 0 - 5 /hpf Final    Amorphous, UA 10/25/2017 Few  None-Moderate Final    Microscopic Comment 10/25/2017 SEE COMMENT   Final    Specimen UA 10/26/2017 Urine, Catheterized   Final    Color, UA 10/26/2017 Yellow  Yellow, Straw, Darline Final    Appearance, UA 10/26/2017 Cloudy* Clear Final    pH, UA 10/26/2017 6.0  5.0 - 8.0 Final    Specific Gravity, UA 10/26/2017 1.015  1.005 - 1.030 Final    Protein, UA 10/26/2017 2+* Negative Final    Glucose, UA 10/26/2017 Negative  Negative Final    Ketones, UA 10/26/2017 Negative  Negative Final    Bilirubin (UA) 10/26/2017 Negative  Negative Final    Occult Blood UA 10/26/2017 3+* Negative Final    Nitrite, UA 10/26/2017 Negative  Negative Final    Urobilinogen, UA 10/26/2017 Negative  <2.0 EU/dL Final    Leukocytes, UA 10/26/2017 3+* Negative Final    POCT Glucose 10/25/2017 123* 70 - 110 mg/dL Final    Prealbumin 10/26/2017 21  20 - 43 mg/dL Final    Procalcitonin 10/26/2017 0.13  <0.25 ng/mL Final    Transferrin 10/26/2017 216  200 - 375 mg/dL Final    Group & Rh 10/26/2017 O POS   Final    Indirect Abhishek 10/26/2017 NEG   Final    aPTT 10/26/2017 23.1  21.0 - 32.0 sec Final    Sodium 10/26/2017 135* 136 - 145 mmol/L Final    Potassium 10/26/2017 4.3  3.5 - 5.1 mmol/L Final    Chloride 10/26/2017 96  95 - 110 mmol/L Final    CO2 10/26/2017 27  23 - 29 mmol/L Final    Glucose 10/26/2017 125* 70 - 110 mg/dL Final    BUN, Bld 10/26/2017 23  8 - 23 mg/dL Final    Creatinine 10/26/2017 1.0  0.5 - 1.4 mg/dL Final    Calcium 10/26/2017 10.4  8.7 - 10.5 mg/dL Final    Total Protein 10/26/2017 6.4  6.0 - 8.4 g/dL Final    Albumin 10/26/2017 3.0* 3.5 - 5.2 g/dL Final    Total Bilirubin 10/26/2017 0.6  0.1 - 1.0 mg/dL Final    Alkaline Phosphatase 10/26/2017 96  55  - 135 U/L Final    AST 10/26/2017 25  10 - 40 U/L Final    ALT 10/26/2017 13  10 - 44 U/L Final    Anion Gap 10/26/2017 12  8 - 16 mmol/L Final    eGFR if  10/26/2017 57.3* >60 mL/min/1.73 m^2 Final    eGFR if non  10/26/2017 49.7* >60 mL/min/1.73 m^2 Final    Magnesium 10/26/2017 2.0  1.6 - 2.6 mg/dL Final    Phosphorus 10/26/2017 3.6  2.7 - 4.5 mg/dL Final    WBC 10/26/2017 15.36* 3.90 - 12.70 K/uL Final    RBC 10/26/2017 3.62* 4.00 - 5.40 M/uL Final    Hemoglobin 10/26/2017 11.3* 12.0 - 16.0 g/dL Final    Hematocrit 10/26/2017 35.0* 37.0 - 48.5 % Final    MCV 10/26/2017 97  82 - 98 fL Final    MCH 10/26/2017 31.2* 27.0 - 31.0 pg Final    MCHC 10/26/2017 32.3  32.0 - 36.0 g/dL Final    RDW 10/26/2017 13.9  11.5 - 14.5 % Final    Platelets 10/26/2017 300  150 - 350 K/uL Final    MPV 10/26/2017 10.4  9.2 - 12.9 fL Final    Immature Granulocytes 10/26/2017 0.5  0.0 - 0.5 % Final    Gran # 10/26/2017 12.3* 1.8 - 7.7 K/uL Final    Immature Grans (Abs) 10/26/2017 0.07* 0.00 - 0.04 K/uL Final    Lymph # 10/26/2017 1.7  1.0 - 4.8 K/uL Final    Mono # 10/26/2017 1.2* 0.3 - 1.0 K/uL Final    Eos # 10/26/2017 0.1  0.0 - 0.5 K/uL Final    Baso # 10/26/2017 0.03  0.00 - 0.20 K/uL Final    nRBC 10/26/2017 0  0 /100 WBC Final    Gran% 10/26/2017 80.0* 38.0 - 73.0 % Final    Lymph% 10/26/2017 10.9* 18.0 - 48.0 % Final    Mono% 10/26/2017 8.1  4.0 - 15.0 % Final    Eosinophil% 10/26/2017 0.3  0.0 - 8.0 % Final    Basophil% 10/26/2017 0.2  0.0 - 1.9 % Final    Differential Method 10/26/2017 Automated   Final    Vit D, 25-Hydroxy 10/26/2017 48  30 - 96 ng/mL Final    RBC, UA 10/26/2017 91* 0 - 4 /hpf Final    WBC, UA 10/26/2017 98* 0 - 5 /hpf Final    Bacteria, UA 10/26/2017 Moderate* None-Occ /hpf Final    Squam Epithel, UA 10/26/2017 1  /hpf Final    Hyaline Casts, UA 10/26/2017 2* 0-1/lpf /lpf Final    Microscopic Comment 10/26/2017 SEE COMMENT   Final    WBC  10/26/2017 12.64  3.90 - 12.70 K/uL Final    RBC 10/26/2017 2.80* 4.00 - 5.40 M/uL Final    Hemoglobin 10/26/2017 8.6* 12.0 - 16.0 g/dL Final    Hematocrit 10/26/2017 27.5* 37.0 - 48.5 % Final    MCV 10/26/2017 98  82 - 98 fL Final    MCH 10/26/2017 30.7  27.0 - 31.0 pg Final    MCHC 10/26/2017 31.3* 32.0 - 36.0 g/dL Final    RDW 10/26/2017 14.1  11.5 - 14.5 % Final    Platelets 10/26/2017 222  150 - 350 K/uL Final    MPV 10/26/2017 9.7  9.2 - 12.9 fL Final    Immature Granulocytes 10/26/2017 0.3  0.0 - 0.5 % Final    Gran # 10/26/2017 10.5* 1.8 - 7.7 K/uL Final    Immature Grans (Abs) 10/26/2017 0.04  0.00 - 0.04 K/uL Final    Lymph # 10/26/2017 1.1  1.0 - 4.8 K/uL Final    Mono # 10/26/2017 1.0  0.3 - 1.0 K/uL Final    Eos # 10/26/2017 0.0  0.0 - 0.5 K/uL Final    Baso # 10/26/2017 0.02  0.00 - 0.20 K/uL Final    nRBC 10/26/2017 0  0 /100 WBC Final    Gran% 10/26/2017 82.8* 38.0 - 73.0 % Final    Lymph% 10/26/2017 8.8* 18.0 - 48.0 % Final    Mono% 10/26/2017 7.7  4.0 - 15.0 % Final    Eosinophil% 10/26/2017 0.2  0.0 - 8.0 % Final    Basophil% 10/26/2017 0.2  0.0 - 1.9 % Final    Differential Method 10/26/2017 Automated   Final    Sodium 10/27/2017 138  136 - 145 mmol/L Final    Potassium 10/27/2017 4.1  3.5 - 5.1 mmol/L Final    Chloride 10/27/2017 102  95 - 110 mmol/L Final    CO2 10/27/2017 26  23 - 29 mmol/L Final    Glucose 10/27/2017 115* 70 - 110 mg/dL Final    BUN, Bld 10/27/2017 29* 8 - 23 mg/dL Final    Creatinine 10/27/2017 1.3  0.5 - 1.4 mg/dL Final    Calcium 10/27/2017 8.6* 8.7 - 10.5 mg/dL Final    Total Protein 10/27/2017 5.3* 6.0 - 8.4 g/dL Final    Albumin 10/27/2017 2.4* 3.5 - 5.2 g/dL Final    Total Bilirubin 10/27/2017 0.3  0.1 - 1.0 mg/dL Final    Alkaline Phosphatase 10/27/2017 83  55 - 135 U/L Final    AST 10/27/2017 36  10 - 40 U/L Final    ALT 10/27/2017 18  10 - 44 U/L Final    Anion Gap 10/27/2017 10  8 - 16 mmol/L Final    eGFR if   10/27/2017 41.7* >60 mL/min/1.73 m^2 Final    eGFR if non  10/27/2017 36.2* >60 mL/min/1.73 m^2 Final    Magnesium 10/27/2017 1.9  1.6 - 2.6 mg/dL Final    Phosphorus 10/27/2017 4.1  2.7 - 4.5 mg/dL Final    WBC 10/27/2017 13.74* 3.90 - 12.70 K/uL Final    RBC 10/27/2017 2.41* 4.00 - 5.40 M/uL Final    Hemoglobin 10/27/2017 7.6* 12.0 - 16.0 g/dL Final    Hematocrit 10/27/2017 24.3* 37.0 - 48.5 % Final    MCV 10/27/2017 101* 82 - 98 fL Final    MCH 10/27/2017 31.5* 27.0 - 31.0 pg Final    MCHC 10/27/2017 31.3* 32.0 - 36.0 g/dL Final    RDW 10/27/2017 14.1  11.5 - 14.5 % Final    Platelets 10/27/2017 185  150 - 350 K/uL Final    MPV 10/27/2017 9.9  9.2 - 12.9 fL Final    Immature Granulocytes 10/27/2017 0.6* 0.0 - 0.5 % Final    Gran # 10/27/2017 10.6* 1.8 - 7.7 K/uL Final    Immature Grans (Abs) 10/27/2017 0.08* 0.00 - 0.04 K/uL Final    Lymph # 10/27/2017 1.8  1.0 - 4.8 K/uL Final    Mono # 10/27/2017 1.3* 0.3 - 1.0 K/uL Final    Eos # 10/27/2017 0.0  0.0 - 0.5 K/uL Final    Baso # 10/27/2017 0.02  0.00 - 0.20 K/uL Final    nRBC 10/27/2017 0  0 /100 WBC Final    Gran% 10/27/2017 76.8* 38.0 - 73.0 % Final    Lymph% 10/27/2017 13.0* 18.0 - 48.0 % Final    Mono% 10/27/2017 9.3  4.0 - 15.0 % Final    Eosinophil% 10/27/2017 0.2  0.0 - 8.0 % Final    Basophil% 10/27/2017 0.1  0.0 - 1.9 % Final    Differential Method 10/27/2017 Automated   Final        Meds   Current Facility-Administered Medications   Medication Dose Route Frequency Provider Last Rate Last Dose    0.9%  NaCl infusion   Intravenous Continuous Derek Verdin MD   Stopped at 10/26/17 1815    acetaminophen tablet 1,000 mg  1,000 mg Oral Q8H Fletcher Polk MD   1,000 mg at 10/27/17 0535    Followed by    acetaminophen tablet 1,000 mg  1,000 mg Oral Q6H Fletcher Polk MD        acetaminophen tablet 650 mg  650 mg Oral Q8H PRN Maria Del Rosario Hackett MD        bisacodyl suppository 10 mg  10 mg Rectal Daily PRN  Fletcher Polk MD        calcium-vitamin D3 500 mg(1,250mg) -200 unit per tablet 1 tablet  1 tablet Oral BID Maria Del Rosario Hackett MD   1 tablet at 10/26/17 2329    cefazolin (ANCEF) 2 gram in dextrose 5% 50 mL IVPB (premix)  2 g Intravenous Q8H Tristan Vaughn  mL/hr at 10/27/17 0534 2 g at 10/27/17 0534    cefTRIAXone (ROCEPHIN) 1 g in dextrose 5 % 50 mL IVPB  1 g Intravenous Q24H Elma Harris MD        dextrose 50% injection 12.5 g  12.5 g Intravenous PRN Maria Del Rosario Hackett MD        dextrose 50% injection 25 g  25 g Intravenous PRN Maria Del Rosario Hackett MD        enoxaparin injection 40 mg  40 mg Subcutaneous Daily Tristan Vaughn MD   40 mg at 10/26/17 2133    glucagon (human recombinant) injection 1 mg  1 mg Intramuscular PRN Maria Del Rosario Hackett MD        glucose chewable tablet 16 g  16 g Oral PRN Maria Del Rosario Hackett MD        glucose chewable tablet 24 g  24 g Oral PRN Maria Del Rosario Hackett MD        ibuprofen tablet 600 mg  600 mg Oral Q6H PRN Maria Del Rosario Hackett MD        influenza (FLUZONE HIGH-DOSE) vaccine 0.5 mL  0.5 mL Intramuscular Prior to discharge Derek Verdin MD        methocarbamol tablet 1,000 mg  1,000 mg Oral Q6H PRN Fletcher Polk MD        nicotine 14 mg/24 hr 1 patch  1 patch Transdermal Daily Fletcher Polk MD        ondansetron disintegrating tablet 8 mg  8 mg Oral Q8H PRN Maria Del Rosario Hackett MD        ondansetron injection 4 mg  4 mg Intravenous Q12H PRN Fletcher Polk MD        ondansetron injection 8 mg  8 mg Intravenous Q8H PRN Maria Del Rosario Hackett MD        pantoprazole EC tablet 40 mg  40 mg Oral Daily Maria Del Rosario Hackett MD        polyethylene glycol packet 17 g  17 g Oral Daily Fletcher Polk MD        polymyxin B sulf-trimethoprim 10,000 unit- 1 mg/mL ophthalmic drops 1 drop  1 drop Left Eye TID Maria Del Rosario Hackett MD   1 drop at 10/27/17 0533    pregabalin capsule 75 mg  75 mg Oral QHS Fletcher Sheeba Polk MD   75 mg at 10/26/17 7588    promethazine  (PHENERGAN) 6.25 mg in dextrose 5 % 50 mL IVPB  6.25 mg Intravenous Q6H PRN Fletcher Polk MD        ramelteon tablet 8 mg  8 mg Oral Nightly PRN Fletcher Polk MD        ropivacaine (PF) 2 mg/ml (0.2%) infusion  10 mL/hr Perineural Continuous Fletcher Polk MD 10 mL/hr at 10/26/17 1932 10 mL/hr at 10/26/17 1932    senna-docusate 8.6-50 mg per tablet 1 tablet  1 tablet Oral BID Fletcher Polk MD   1 tablet at 10/26/17 2132    simvastatin tablet 40 mg  40 mg Oral QHS Maria Del Rosario Hackett MD   40 mg at 10/26/17 2132    sodium chloride 0.9% injection 3 mL  3 mL Intravenous Q8H PRN Fletcher Polk MD            Anticoagulant dose lovenox at 40mg.    Assessment:     Pain control adequate    Plan:     Patient doing well, continue present treatment.    Evaluator Ayse Nassar    Pt seen and examined.  Dr. Nassar's note reviewed.  Agree with assessment and plan.

## 2017-10-27 NOTE — PROGRESS NOTES
Dr. simon  aps  Notified  Of  Pt's   B/p  Of  69/44..0110    Came  To  Examine  The  Pt   And   Ordered  A 5000cc  Ns  Iv   Bolus .

## 2017-10-27 NOTE — OP NOTE
DATE OF PROCEDURE: 10/26/17    PREOPERATIVE DIAGNOSIS: Displaced left intertrochanteric femur fracture.    POSTOPERATIVE DIAGNOSIS: Same.    PROCEDURE PERFORMED: Intramedullary nailing,  Left intertrochanteric femur fracture.    SURGEON: Isiah Quinonez MD    ASSISTANT: Tristan Vaughn MD (Res)    ESTIMATED BLOOD LOSS: 200 cc.    ANESTHESIA: General endotracheal.    IMPLANTS: synthes TFN [id number] w/ 95mm screw    INDICATIONS: The patient is an 89 y.o. female who had a low energy fall sustaining a  Left intertrochanteric femur fracture. The patient presented to the Emergency Department and was evaluated by Orthopaedics and Internal Medicine. This procedure, as well as, alternatives to this procedure was discussed at length with the patient. Risks and benefits were also discussed. Risks include but are not limited to bleeding, infection, numbness, scarring, damage to major neurovascular structures, limb length/rotation discrepancy, failure of hardware, need for further surgery, loss of function, myocardial infarction, deep venous thrombosis, pulmonary embolism, nonunion, malunion and death. Patient understood these well and consented for the procedure as described..    DESCRIPTION OF PROCEDURE: The patient was identified in the preoperative holding area and site was marked. The patient was wheeled into the Operating Room and general endotracheal anesthesia was induced in the patient's hospital bed.  A timeout was taken to confirm the patient, site, surgery, surgeon and administration of preoperative antibiotics. All agreed and we proceeded. The patient was placed in fracture boots, transferred to the fracture table and manipulated under fluoroscopic control until we could obtain near anatomic alignment. The operative site was prepped and draped in the usual sterile fashion. A 3 cm longitudinal incision was made over the lateral aspect of the hip just proximal and posterior to the greater trochanter. The gluteal fascia was  incised and a hematoma was evacuated. A threaded guide pin was placed on the tip of the greater trochanter and entered into the proximal femur. This was checked under AP and lateral fluoroscopic views. A drill was used to breach the cortex and a guide pin was placed down the femoral canal distally to the physeal scar of the distal femur. A reamer was then used in a increasing incremental fashion to ream the femoral canal.  A 380mm nail was then placed without difficulty. The interlocking guide was then placed, and a 3 cm longitudinal incision was made over the lateral aspect of the thigh.  Subcutaneous tissue and fascia janusz were incised.  The guide pin was then placed in the interlocking hole for the proximal helical blade, and the guide pin was placed into the center/center position of the femoral neck and head as verified on AP and lateral fluoroscopic views.  This was measured at 95mm. The step drill was used to ream the lateral cortex of the femur, and the lag screw was placed. Attention was then given to the distal nail, and using the perfect circles technique, a drill bit was placed through the intramedullary nail and bilateral cortices.  A proper size and fit of the distal screw was also noted and placed. On fluoroscopic control, placement was confirmed in AP and lateral fluoroscopic views. A near anatomical alignment of the fracture site was completed and all hardware was properly fixed. All wounds were thoroughly irrigated and hemostasis confirmed. The fascial layers were then reapproximated using #1 Vicryl suture in a figure-of-eight fashion. The subcutaneous tissues were reapproximated in layers using 3-0 Vicryl sutures, and the skin was reapproximated with #3-0 nylon. Sterile dressings were applied. All instrument and sponge counts were reported correct at the end of the case. There were no complications. The patient was extubated, awakened and taken to the post anesthesia care unit in stable condition.      PLAN FOR THE PATIENT: WBAT, DVT prophylaxis per primary team.    I was present for all key aspects of this surgery, and either in the room or immediately available for the entire operation.

## 2017-10-27 NOTE — ADDENDUM NOTE
Addendum  created 10/27/17 1258 by Shankar Perez MD    Charge Capture section accepted, Sign clinical note

## 2017-10-27 NOTE — PLAN OF CARE
12:08 PM  DAR called in LOCET to state. Faxed PASRR to [facility]. Will await 142.    Xenia Moreno LMSW   [facility]  Ext [phone]

## 2017-10-27 NOTE — PLAN OF CARE
POD 1 s/p IMN of left femur fracture. Patient very sleepy and hard of hearing. PT/OT ordered to eval and treat. PT/OT recs pending. Patient currently lives with her spouse. No family present at the bedside. CM completed discharge assessment and planning with patient. Patient unable to verbalize understanding. SW to call patient's family for SNF choices. SW and CM will continue to follow for any additional needs. Plan A to discharge to SNF as soon as medically stable. Plan B to discharge home with home health.     PCP: Twila Loera MD    Pharmacy:  [facility] [address] [address]  Phone: [phone] Fax: [phone]    [facility] [address] [place] [address] [place] - [address] [facility]  [address] [facility] [address]  Phone: [phone] Fax: [phone]    Payor: MEDICARE / Plan: MEDICARE PART A & B / Product Type: [facility] /      10/27/17 0800   Discharge Assessment   Assessment Type Discharge Planning Assessment   Confirmed/corrected address and phone number on facesheet? Yes   Assessment information obtained from? Patient;Medical Record   Expected Length of Stay (days) 6   Communicated expected length of stay with patient/caregiver yes   Prior to hospitilization cognitive status: Unable to Assess   Prior to hospitalization functional status: Assistive Equipment   Current cognitive status: Unable to Assess   Current Functional Status: Needs Assistance   Lives With spouse   Able to Return to Prior Arrangements unable to determine at this time (comments)   Is patient able to care for self after discharge? Unable to determine at this time (comments)   Who are your caregiver(s) and their phone number(s)? spouse- Isiah Serjio [phone]; daughter- Brandi Bassett [phone]   Patient's perception of discharge disposition skilled nursing facility   Readmission Within The Last 30 Days no previous admission in  last 30 days   Patient currently being followed by outpatient case management? No   Patient currently receives any other outside agency services? No   Equipment Currently Used at Home other (see comments)  (KASSIDY)   Do you have any problems affording any of your prescribed medications? No   Is the patient taking medications as prescribed? yes   Does the patient have transportation home? Yes   Transportation Available family or friend will provide   Does the patient receive services at the [facility]? No   Discharge Plan A Skilled Nursing Facility   Discharge Plan B Home Health;Home with family   Patient/Family In Agreement With Plan unable to assess

## 2017-10-27 NOTE — PLAN OF CARE
Problem: Patient Care Overview  Goal: Plan of Care Review  Outcome: Ongoing (interventions implemented as appropriate)  Patient resting in bed comfortably. IV intact and  free of infection and irration,fall precautions maintained no falls noted. Call light in reach bed locked and in lowest position. Non skid socks on while out of bed. Patient instructed to call for assistance. Skin integrity maintained as patient is assisted with shifting positions, C/o pain managed with PRN meds, No other complaints or concerns. Progressing towards goals. Will continue to monitor and follow careplan of care.

## 2017-10-27 NOTE — PLAN OF CARE
Problem: Fall Risk (Adult)  Goal: Identify Related Risk Factors and Signs and Symptoms  Related risk factors and signs and symptoms are identified upon initiation of Human Response Clinical Practice Guideline (CPG)   Patient progressing towards goals. Pain controlled  With  medsNeurovascularly  Intact.  Bean patent and intact  And  Draining  Clear yellow  Urine .Bed in low position and call bell in reach.

## 2017-10-27 NOTE — ASSESSMENT & PLAN NOTE
Patient on admit U/A with 98 WBCs and moderate bacteria and negative nitrite consistent with likely UTI. Awaiting urine culture results and will continue Rocephin 1 gram IV daily to treat and will need 3 days of antibiotics. Patient with no signs to suggest sepsis.

## 2017-10-27 NOTE — PLAN OF CARE
Problem: Occupational Therapy Goal  Goal: Occupational Therapy Goal  Goals to be met by: 10 days     Patient will increase functional independence with ADLs by performing:    UE Dressing with Stand-by Assistance.  LE Dressing with Moderate Assistance and Assistive Devices as needed.  Grooming while seated with Set-up Assistance.  Toileting from bedside commode with Moderate Assistance for hygiene and clothing management.   Stand pivot transfers with Minimal Assistance.  Toilet transfer to bedside commode with Minimal Assistance.      KARRIE Peterson  10/27/2017

## 2017-10-27 NOTE — PROGRESS NOTES
Dr. simon  Notified  Of  Pt's  B/p  Of  85/55  .  Dr. simon  Ordered   A  500cc  Normal  Saline   Bolus   To  Be   Given.  Pt    asymptomatic

## 2017-10-27 NOTE — ANESTHESIA POSTPROCEDURE EVALUATION
"Anesthesia Post Evaluation    Patient: Simona Bassett    Procedure(s) Performed: Procedure(s) (LRB):  IM NAILING OF FEMUR - left - synthes - profx (Left)    Final Anesthesia Type: general  Patient location during evaluation: PACU  Patient participation: Yes- Able to Participate  Level of consciousness: awake and alert  Post-procedure vital signs: reviewed and stable  Pain management: adequate  Airway patency: patent  PONV status at discharge: No PONV  Anesthetic complications: no      Cardiovascular status: blood pressure returned to baseline  Respiratory status: unassisted  Hydration status: euvolemic  Follow-up not needed.        Visit Vitals  BP (!) 95/46 (BP Location: Right arm, Patient Position: Lying)   Pulse 94   Temp 37 °C (98.6 °F) (Tympanic)   Resp 20   Ht 4' 11" (1.499 m)   Wt 43.1 kg (95 lb)   SpO2 (!) 92%   Breastfeeding? No   BMI 19.19 kg/m²       Pain/Alex Score: Pain Assessment Performed: Yes (10/26/2017  9:30 PM)  Presence of Pain: denies (10/26/2017  9:30 PM)  Pain Rating Prior to Med Admin: 0 (10/27/2017  5:35 AM)  Pain Rating Post Med Admin: 0 (10/26/2017  9:30 PM)  Alex Score: 10 (10/26/2017  9:30 PM)      "

## 2017-10-27 NOTE — SUBJECTIVE & OBJECTIVE
Interval History: Patient this am had recorded BP 74/43 while sitting up in bedside chair. I was bedside at that time and patient was awake and alert and denied any dizziness or lightheadedness and asymptomatic. Cuff size was changed to smaller BP cuff as patient has very thin arms and with smaller cuff patient's BP was noted to be 111/51. Patient was also noted at that time on pulse ox to have room air sats of 81% but patient has nail polish on and when pulse ox checked on her ear was 98% on 2 liters. Patient denied any chest pain or SOB or difficulty breathing. Patient denies any left hip pain s/p IM nail in OR yesterday for intertrochanteric fracture.     Review of Systems   Constitutional: Negative for fever.   HENT: Positive for hearing loss (Patient very hard of hearing and uses hearing aids).    Respiratory: Negative for cough, chest tightness and shortness of breath.    Cardiovascular: Negative for chest pain and leg swelling.   Gastrointestinal: Negative for abdominal pain, nausea and vomiting.   Musculoskeletal: Positive for back pain. Negative for arthralgias.   Skin: Negative for rash.   Neurological: Negative for dizziness and light-headedness.   Psychiatric/Behavioral: Negative for confusion and hallucinations.     Objective:     Vital Signs (Most Recent):  Temp: 97.6 °F (36.4 °C) (10/27/17 1146)  Pulse: 84 (10/27/17 1146)  Resp: 18 (10/27/17 1220)  BP: 111/51 (10/27/17 1220)  SpO2: 98 % on 2 liters of oxygen(10/27/17 1220) Vital Signs (24h Range):  Temp:  [96.6 °F (35.9 °C)-98.6 °F (37 °C)] 97.6 °F (36.4 °C)  Pulse:  [] 84  Resp:  [15-20] 18  SpO2:  [81 %-100 %] 84 %  BP: ()/(43-96) 111/51     Weight: 43.1 kg (95 lb)  Body mass index is 19.19 kg/m².    Intake/Output Summary (Last 24 hours) at 10/27/17 1248  Last data filed at 10/27/17 0500   Gross per 24 hour   Intake             1465 ml   Output              275 ml   Net             1190 ml      Physical Exam   Constitutional: No  distress.   Thin and frail elderly female   HENT:   Mouth/Throat: Oropharynx is clear and moist.   Eyes: Conjunctivae are normal.   Neck: Neck supple.   Cardiovascular: Normal rate, regular rhythm and normal heart sounds.  Exam reveals no gallop and no friction rub.    No murmur heard.  Pulmonary/Chest: Effort normal and breath sounds normal. She has no wheezes.   Abdominal: Soft. Bowel sounds are normal. She exhibits no distension. There is no tenderness.   Musculoskeletal: She exhibits no edema.   Neurological: She is alert.   Oriented to self and place but not time   Skin: Skin is warm. No erythema.   Surgical bandage in place to left hip    Psychiatric: She has a normal mood and affect. Her behavior is normal.       Significant Labs:   CBC:   Recent Labs  Lab 10/26/17  0433 10/26/17  1917 10/27/17  0513   WBC 15.36* 12.64 13.74*   HGB 11.3* 8.6* 7.6*   HCT 35.0* 27.5* 24.3*    222 185     CMP:   Recent Labs  Lab 10/25/17  1702 10/26/17  0433 10/27/17  0513    135* 138   K 3.5 4.3 4.1   CL 95 96 102   CO2 34* 27 26   GLU 66* 125* 115*   BUN 19 23 29*   CREATININE 0.8 1.0 1.3   CALCIUM 10.2 10.4 8.6*   PROT  --  6.4 5.3*   ALBUMIN  --  3.0* 2.4*   BILITOT  --  0.6 0.3   ALKPHOS  --  96 83   AST  --  25 36   ALT  --  13 18   ANIONGAP 9 12 10   EGFRNONAA >60.0 49.7* 36.2*     Lab Results   Component Value Date    TTQANVAR05UM 48 10/26/2017      Significant Imaging: I have reviewed all pertinent imaging results/findings within the past 24 hours.

## 2017-10-27 NOTE — PROGRESS NOTES
Notified by the nursing staff that the patient continues to be hypotensive. Upon discussion with nursing staff, patient was given 250 mL of normal saline instead of the 500 mL bolus ordered. Instructed to administer 500 mL bolus of normal saline now. Patient able to state her name, , and location. Patient able to aroused from sleep with physical stimulus. Will reassess after fluid bolus.     Cachorro Nassar MD  PGY-4  Anesthesiology

## 2017-10-28 LAB
ALBUMIN SERPL BCP-MCNC: 2.1 G/DL
ALP SERPL-CCNC: 81 U/L
ALT SERPL W/O P-5'-P-CCNC: <5 U/L
ANION GAP SERPL CALC-SCNC: 8 MMOL/L
AST SERPL-CCNC: 36 U/L
BASOPHILS # BLD AUTO: 0.03 K/UL
BASOPHILS NFR BLD: 0.3 %
BILIRUB SERPL-MCNC: 0.2 MG/DL
BLD PROD TYP BPU: NORMAL
BLOOD UNIT EXPIRATION DATE: NORMAL
BLOOD UNIT TYPE CODE: 5100
BLOOD UNIT TYPE: NORMAL
BUN SERPL-MCNC: 28 MG/DL
CALCIUM SERPL-MCNC: 8.1 MG/DL
CHLORIDE SERPL-SCNC: 104 MMOL/L
CO2 SERPL-SCNC: 26 MMOL/L
CODING SYSTEM: NORMAL
CREAT SERPL-MCNC: 1 MG/DL
DIFFERENTIAL METHOD: ABNORMAL
DISPENSE STATUS: NORMAL
EOSINOPHIL # BLD AUTO: 0.1 K/UL
EOSINOPHIL NFR BLD: 1.3 %
ERYTHROCYTE [DISTWIDTH] IN BLOOD BY AUTOMATED COUNT: 14.2 %
EST. GFR  (AFRICAN AMERICAN): 57.3 ML/MIN/1.73 M^2
EST. GFR  (NON AFRICAN AMERICAN): 49.7 ML/MIN/1.73 M^2
GLUCOSE SERPL-MCNC: 89 MG/DL
HCT VFR BLD AUTO: 21.2 %
HGB BLD-MCNC: 6.5 G/DL
IMM GRANULOCYTES # BLD AUTO: 0.05 K/UL
IMM GRANULOCYTES NFR BLD AUTO: 0.5 %
LYMPHOCYTES # BLD AUTO: 1.4 K/UL
LYMPHOCYTES NFR BLD: 15.5 %
MAGNESIUM SERPL-MCNC: 1.8 MG/DL
MCH RBC QN AUTO: 30.8 PG
MCHC RBC AUTO-ENTMCNC: 30.7 G/DL
MCV RBC AUTO: 101 FL
MONOCYTES # BLD AUTO: 0.8 K/UL
MONOCYTES NFR BLD: 8.5 %
NEUTROPHILS # BLD AUTO: 6.7 K/UL
NEUTROPHILS NFR BLD: 73.9 %
NRBC BLD-RTO: 0 /100 WBC
PHOSPHATE SERPL-MCNC: 2.4 MG/DL
PLATELET # BLD AUTO: 164 K/UL
PMV BLD AUTO: 10.3 FL
POTASSIUM SERPL-SCNC: 3.9 MMOL/L
PROT SERPL-MCNC: 4.9 G/DL
RBC # BLD AUTO: 2.11 M/UL
SODIUM SERPL-SCNC: 138 MMOL/L
TRANS ERYTHROCYTES VOL PATIENT: NORMAL ML
WBC # BLD AUTO: 9.1 K/UL

## 2017-10-28 PROCEDURE — 84100 ASSAY OF PHOSPHORUS: CPT

## 2017-10-28 PROCEDURE — 25000003 PHARM REV CODE 250: Performed by: INTERNAL MEDICINE

## 2017-10-28 PROCEDURE — 36430 TRANSFUSION BLD/BLD COMPNT: CPT

## 2017-10-28 PROCEDURE — 99232 SBSQ HOSP IP/OBS MODERATE 35: CPT | Mod: ,,, | Performed by: INTERNAL MEDICINE

## 2017-10-28 PROCEDURE — 11000001 HC ACUTE MED/SURG PRIVATE ROOM

## 2017-10-28 PROCEDURE — 36415 COLL VENOUS BLD VENIPUNCTURE: CPT

## 2017-10-28 PROCEDURE — P9021 RED BLOOD CELLS UNIT: HCPCS

## 2017-10-28 PROCEDURE — 25000003 PHARM REV CODE 250: Performed by: STUDENT IN AN ORGANIZED HEALTH CARE EDUCATION/TRAINING PROGRAM

## 2017-10-28 PROCEDURE — 99231 SBSQ HOSP IP/OBS SF/LOW 25: CPT | Mod: ,,, | Performed by: ANESTHESIOLOGY

## 2017-10-28 PROCEDURE — 80053 COMPREHEN METABOLIC PANEL: CPT

## 2017-10-28 PROCEDURE — 83735 ASSAY OF MAGNESIUM: CPT

## 2017-10-28 PROCEDURE — 85025 COMPLETE CBC W/AUTO DIFF WBC: CPT

## 2017-10-28 PROCEDURE — 97530 THERAPEUTIC ACTIVITIES: CPT

## 2017-10-28 PROCEDURE — 25000003 PHARM REV CODE 250: Performed by: HOSPITALIST

## 2017-10-28 PROCEDURE — S4991 NICOTINE PATCH NONLEGEND: HCPCS | Performed by: STUDENT IN AN ORGANIZED HEALTH CARE EDUCATION/TRAINING PROGRAM

## 2017-10-28 PROCEDURE — 63600175 PHARM REV CODE 636 W HCPCS: Performed by: STUDENT IN AN ORGANIZED HEALTH CARE EDUCATION/TRAINING PROGRAM

## 2017-10-28 RX ORDER — HYDROCODONE BITARTRATE AND ACETAMINOPHEN 500; 5 MG/1; MG/1
TABLET ORAL
Status: DISCONTINUED | OUTPATIENT
Start: 2017-10-28 | End: 2017-10-30 | Stop reason: HOSPADM

## 2017-10-28 RX ADMIN — SIMVASTATIN 40 MG: 20 TABLET, FILM COATED ORAL at 10:10

## 2017-10-28 RX ADMIN — NICOTINE 1 PATCH: 14 PATCH, EXTENDED RELEASE TRANSDERMAL at 09:10

## 2017-10-28 RX ADMIN — ACETAMINOPHEN 1000 MG: 500 TABLET ORAL at 05:10

## 2017-10-28 RX ADMIN — OYSTER SHELL CALCIUM WITH VITAMIN D 1 TABLET: 500; 200 TABLET, FILM COATED ORAL at 09:10

## 2017-10-28 RX ADMIN — POLYMYXIN B SULFATE, TRIMETHOPRIM SULFATE 1 DROP: 10000; 1 SOLUTION/ DROPS OPHTHALMIC at 06:10

## 2017-10-28 RX ADMIN — PREGABALIN 75 MG: 75 CAPSULE ORAL at 10:10

## 2017-10-28 RX ADMIN — STANDARDIZED SENNA CONCENTRATE AND DOCUSATE SODIUM 1 TABLET: 8.6; 5 TABLET, FILM COATED ORAL at 09:10

## 2017-10-28 RX ADMIN — TRAMADOL HYDROCHLORIDE 50 MG: 50 TABLET, FILM COATED ORAL at 09:10

## 2017-10-28 RX ADMIN — POLYMYXIN B SULFATE, TRIMETHOPRIM SULFATE 1 DROP: 10000; 1 SOLUTION/ DROPS OPHTHALMIC at 10:10

## 2017-10-28 RX ADMIN — ENOXAPARIN SODIUM 40 MG: 100 INJECTION SUBCUTANEOUS at 05:10

## 2017-10-28 RX ADMIN — POLYMYXIN B SULFATE, TRIMETHOPRIM SULFATE 1 DROP: 10000; 1 SOLUTION/ DROPS OPHTHALMIC at 01:10

## 2017-10-28 RX ADMIN — OYSTER SHELL CALCIUM WITH VITAMIN D 1 TABLET: 500; 200 TABLET, FILM COATED ORAL at 10:10

## 2017-10-28 RX ADMIN — STANDARDIZED SENNA CONCENTRATE AND DOCUSATE SODIUM 1 TABLET: 8.6; 5 TABLET, FILM COATED ORAL at 10:10

## 2017-10-28 RX ADMIN — ACETAMINOPHEN 1000 MG: 500 TABLET ORAL at 06:10

## 2017-10-28 RX ADMIN — ROPIVACAINE HYDROCHLORIDE 10 ML/HR: 2 INJECTION, SOLUTION EPIDURAL; INFILTRATION at 10:10

## 2017-10-28 RX ADMIN — PANTOPRAZOLE SODIUM 40 MG: 40 TABLET, DELAYED RELEASE ORAL at 09:10

## 2017-10-28 NOTE — ADDENDUM NOTE
Addendum  created 10/28/17 1154 by Chelo Zhou MD    Charge Capture section accepted, Sign clinical note

## 2017-10-28 NOTE — PLAN OF CARE
Problem: Physical Therapy Goal  Goal: Physical Therapy Goal  Goals to be met by: 17     Patient will increase functional independence with mobility by performin. Supine to sit with MInimal Assistance  2. Sit to supine with MInimal Assistance  3. Sit to stand transfer with Minimal Assistance  4. Bed to chair transfer with Minimal Assistance using Rolling Walker  5. Gait  x 15 feet with Minimal Assistance using Rolling Walker.   6. Lower extremity exercise program x 30 reps per handout, with independence     Outcome: Ongoing (interventions implemented as appropriate)  Continue POC.

## 2017-10-28 NOTE — SUBJECTIVE & OBJECTIVE
"Principal Problem:Closed displaced intertrochanteric fracture of left femur with routine healing    Principal Orthopedic Problem: same    Interval History: Patient seen and examined at bedside.  No acute events overnight.  Pain controlled.     Review of patient's allergies indicates:  No Known Allergies    Current Facility-Administered Medications   Medication    acetaminophen tablet 1,000 mg    acetaminophen tablet 650 mg    bisacodyl suppository 10 mg    calcium-vitamin D3 500 mg(1,250mg) -200 unit per tablet 1 tablet    cefTRIAXone (ROCEPHIN) 1 g in dextrose 5 % 50 mL IVPB    dextrose 50% injection 12.5 g    dextrose 50% injection 25 g    enoxaparin injection 40 mg    glucagon (human recombinant) injection 1 mg    glucose chewable tablet 16 g    glucose chewable tablet 24 g    ibuprofen tablet 600 mg    influenza (FLUZONE HIGH-DOSE) vaccine 0.5 mL    methocarbamol tablet 1,000 mg    nicotine 14 mg/24 hr 1 patch    ondansetron disintegrating tablet 8 mg    ondansetron injection 4 mg    ondansetron injection 8 mg    pantoprazole EC tablet 40 mg    polyethylene glycol packet 17 g    polymyxin B sulf-trimethoprim 10,000 unit- 1 mg/mL ophthalmic drops 1 drop    pregabalin capsule 75 mg    promethazine (PHENERGAN) 6.25 mg in dextrose 5 % 50 mL IVPB    ramelteon tablet 8 mg    ropivacaine (PF) 2 mg/ml (0.2%) infusion    senna-docusate 8.6-50 mg per tablet 1 tablet    simvastatin tablet 40 mg    sodium chloride 0.9% injection 3 mL    traMADol tablet 50 mg     Objective:     Vital Signs (Most Recent):  Temp: 96.1 °F (35.6 °C) (10/28/17 0540)  Pulse: 73 (10/28/17 0540)  Resp: 16 (10/28/17 0540)  BP: (!) 99/56 (10/28/17 0540)  SpO2: 96 % (10/28/17 0540) Vital Signs (24h Range):  Temp:  [96.1 °F (35.6 °C)-97.6 °F (36.4 °C)] 96.1 °F (35.6 °C)  Pulse:  [73-92] 73  Resp:  [16-18] 16  SpO2:  [81 %-100 %] 96 %  BP: ()/(36-66) 99/56     Weight: 43.1 kg (95 lb)  Height: 4' 11" (149.9 cm)  Body " mass index is 19.19 kg/m².      Intake/Output Summary (Last 24 hours) at 10/28/17 0617  Last data filed at 10/28/17 0500   Gross per 24 hour   Intake              900 ml   Output                0 ml   Net              900 ml       Ortho/SPM Exam     AAOx4  NAD  RRR  No increased WOB  Aquacel c/d/i  SILT and motor intact T/SP/DP  WWP extremities  FCDs in place and functioning    Significant Labs: All pertinent labs within the past 24 hours have been reviewed.    Significant Imaging: I have reviewed all pertinent imaging results/findings.

## 2017-10-28 NOTE — ASSESSMENT & PLAN NOTE
· Patient smokes 1PPD x 60 years of cigarettes.  · Nicotine patch 21 mg daily while hospitalized.   · Incentive spirometry post-op to prevent post-op pulmonary complications in known active smoker.

## 2017-10-28 NOTE — ASSESSMENT & PLAN NOTE
· Chronic issue that was present on admit as family reports poor appetite at home.   · Prealbumin 21 and adequate on admit.   · Boost 1 can with meals to help with nutrition.

## 2017-10-28 NOTE — PLAN OF CARE
Problem: Patient Care Overview  Goal: Individualization & Mutuality  Outcome: Ongoing (interventions implemented as appropriate)  Patient progressing towards goals. Pain controlled  With  medsNeurovascularly  Intact. Bed in low position and call bell in reach.

## 2017-10-28 NOTE — ASSESSMENT & PLAN NOTE
A/P: 89 y.o. female POD 2 s/p left CMN for intertrochanteric femur fracture     Continue close medical management given hypotensive episodes  PT/OT as tolerated.  Please attempt to get patient out of bed today with strict supervision  Postop abx in place  Lovenox 40mg qday for DVT     Dispo: Stable from orthopaedic management perspective. Appreciate medical management.

## 2017-10-28 NOTE — ASSESSMENT & PLAN NOTE
Encounter for aftercare for healing closed traumatic fracture of left hip  Patient is POD # 2. Patient reports no pain in left hip.   · Plan is to continue PT/OT for gait training and strengthening and restoration of ADL's.   · Patient is TTWB: left lower extremity as per Orthopedic recommendations.   · Plan is to continue Lovenox 40 mg subcutaneous daily and will need a total of 4 weeks post-op after hip fracture surgery for DVT prophylaxis.   · Orthopedics is following and managing hip fracture and surgical site. Perineural pain catheter in place for pain control and being managed by Anesthesia Pain Service.   · Skilled nursing consult placed as patient will need skilled care upon discharge from the hospital and  notified and working on choices with family and referrals sent to [facility], [facility] and [facility]

## 2017-10-28 NOTE — PLAN OF CARE
Problem: Patient Care Overview  Goal: Plan of Care Review  Outcome: Ongoing (interventions implemented as appropriate)  Pt alert. VSS. Son at bedside. POC reviewed with pt and son. 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 Pt received 1 unit packed RBC today, tolerated well. Bed in low position. Call light in reach. Will monitor.

## 2017-10-28 NOTE — PROGRESS NOTES
[facility]  Orthopedics  Progress Note    Patient Name: Simona Bassett  MRN: [id number]  Admission Date: 10/25/2017  Hospital Length of Stay: 3 days  Attending Provider: Elma Harris MD  Primary Care Provider: Twila Loera MD  Follow-up For: Procedure(s) (LRB):  IM NAILING OF FEMUR - left - synthes - profx (Left)    Post-Operative Day: 2 Days Post-Op  Subjective:     Principal Problem:Closed displaced intertrochanteric fracture of left femur with routine healing    Principal Orthopedic Problem: same    Interval History: Patient seen and examined at bedside.  No acute events overnight.  Pain controlled.     Review of patient's allergies indicates:  No Known Allergies    Current Facility-Administered Medications   Medication    acetaminophen tablet 1,000 mg    acetaminophen tablet 650 mg    bisacodyl suppository 10 mg    calcium-vitamin D3 500 mg(1,250mg) -200 unit per tablet 1 tablet    cefTRIAXone (ROCEPHIN) 1 g in dextrose 5 % 50 mL IVPB    dextrose 50% injection 12.5 g    dextrose 50% injection 25 g    enoxaparin injection 40 mg    glucagon (human recombinant) injection 1 mg    glucose chewable tablet 16 g    glucose chewable tablet 24 g    ibuprofen tablet 600 mg    influenza (FLUZONE HIGH-DOSE) vaccine 0.5 mL    methocarbamol tablet 1,000 mg    nicotine 14 mg/24 hr 1 patch    ondansetron disintegrating tablet 8 mg    ondansetron injection 4 mg    ondansetron injection 8 mg    pantoprazole EC tablet 40 mg    polyethylene glycol packet 17 g    polymyxin B sulf-trimethoprim 10,000 unit- 1 mg/mL ophthalmic drops 1 drop    pregabalin capsule 75 mg    promethazine (PHENERGAN) 6.25 mg in dextrose 5 % 50 mL IVPB    ramelteon tablet 8 mg    ropivacaine (PF) 2 mg/ml (0.2%) infusion    senna-docusate 8.6-50 mg per tablet 1 tablet    simvastatin tablet 40 mg    sodium chloride 0.9% injection 3 mL    traMADol tablet 50 mg     Objective:     Vital Signs (Most  "Recent):  Temp: 96.1 °F (35.6 °C) (10/28/17 0540)  Pulse: 73 (10/28/17 0540)  Resp: 16 (10/28/17 0540)  BP: (!) 99/56 (10/28/17 0540)  SpO2: 96 % (10/28/17 0540) Vital Signs (24h Range):  Temp:  [96.1 °F (35.6 °C)-97.6 °F (36.4 °C)] 96.1 °F (35.6 °C)  Pulse:  [73-92] 73  Resp:  [16-18] 16  SpO2:  [81 %-100 %] 96 %  BP: ()/(36-66) 99/56     Weight: 43.1 kg (95 lb)  Height: 4' 11" (149.9 cm)  Body mass index is 19.19 kg/m².      Intake/Output Summary (Last 24 hours) at 10/28/17 0617  Last data filed at 10/28/17 0500   Gross per 24 hour   Intake              900 ml   Output                0 ml   Net              900 ml       Ortho/SPM Exam     AAOx4  NAD  RRR  No increased WOB  Aquacel c/d/i  SILT and motor intact T/SP/DP  WWP extremities  FCDs in place and functioning    Significant Labs: All pertinent labs within the past 24 hours have been reviewed.    Significant Imaging: I have reviewed all pertinent imaging results/findings.    Assessment/Plan:     * Closed displaced intertrochanteric fracture of left femur with routine healing s/p IM tayo on 10/26/2017    A/P: 89 y.o. female  POD 2 s/p left CMN for intertrochanteric femur fracture     -Continue close medical management given hypotensive episodes  PT/OT as tolerated.    -Please attempt to get patient out of bed today with strict supervision  -Postop abx in place  -Lovenox 40mg qday for DVT   -WBAT  Dispo: Stable from orthopaedic management perspective. Appreciate medical management.         Closed fracture of base of fifth metatarsal bone    L pseudojones fx  - please place boot on for ambulation               Loc Garcia MD  Orthopedics  [facility]  "

## 2017-10-28 NOTE — ANESTHESIA POST-OP PAIN MANAGEMENT
Acute Pain Service Progress Note    Simona Bassett is a 89 y.o., female, [id number].    Surgery:  IM NAILING OF FEMUR - left - synthes - profx (Left)    Post Op Day #: 2     N o issues ON. Pain controlled. PNC paused this AM.    Catheter type: perineural  SIFI    Infusion type: Ropivacaine 0.2%  10 basal    Problem List:    Active Hospital Problems    Diagnosis  POA    *Closed displaced intertrochanteric fracture of left femur with routine healing s/p IM tayo on 10/26/2017 [S72.142D]  Not Applicable    Encounter for aftercare for healing closed traumatic fracture of left hip [S72.002D]  Not Applicable    Acute blood loss as cause of postoperative anemia [D62]  No    Acute cystitis with hematuria [N30.01]  Yes    Poor appetite [R63.0]  Yes    Closed fracture of base of fifth metatarsal bone [S92.353A]  Yes    Constipation due to pain medication [K59.03]  Yes    Gastroesophageal reflux disease without esophagitis [K21.9]  Yes    Mixed hyperlipidemia [E78.2]  Yes    Osteoporosis, post-menopausal [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       Subjective:     General appearance of rouseable and responsive but sleepy   Pain with rest: 2    Faces   Pain with movement: 2    Faces   Side Effects    1. Pruritis No    2. Nausea No    3. Motor Blockade No, 0=Ability to raise lower extremities off bed    4. Sedation Yes, S=sleep, easy to arouse    Objective:        Catheter site clean, dry, intact      Vitals   Vitals:    10/28/17 0540   BP: (!) 99/56   Pulse: 73   Resp: 16   Temp: 35.6 °C (96.1 °F)        Labs    Admission on 10/25/2017   Component Date Value Ref Range Status    Sodium 10/25/2017 138  136 - 145 mmol/L Final    Potassium 10/25/2017 3.5  3.5 - 5.1 mmol/L Final    Chloride 10/25/2017 95  95 - 110 mmol/L Final    CO2 10/25/2017 34* 23 - 29 mmol/L Final    Glucose 10/25/2017 66* 70 - 110 mg/dL Final     BUN, Bld 10/25/2017 19  8 - 23 mg/dL Final    Creatinine 10/25/2017 0.8  0.5 - 1.4 mg/dL Final    Calcium 10/25/2017 10.2  8.7 - 10.5 mg/dL Final    Anion Gap 10/25/2017 9  8 - 16 mmol/L Final    eGFR if African American 10/25/2017 >60.0  >60 mL/min/1.73 m^2 Final    eGFR if non African American 10/25/2017 >60.0  >60 mL/min/1.73 m^2 Final    WBC 10/25/2017 8.47  3.90 - 12.70 K/uL Final    RBC 10/25/2017 4.06  4.00 - 5.40 M/uL Final    Hemoglobin 10/25/2017 12.3  12.0 - 16.0 g/dL Final    Hematocrit 10/25/2017 39.0  37.0 - 48.5 % Final    MCV 10/25/2017 96  82 - 98 fL Final    MCH 10/25/2017 30.3  27.0 - 31.0 pg Final    MCHC 10/25/2017 31.5* 32.0 - 36.0 g/dL Final    RDW 10/25/2017 13.8  11.5 - 14.5 % Final    Platelets 10/25/2017 321  150 - 350 K/uL Final    MPV 10/25/2017 9.7  9.2 - 12.9 fL Final    Immature Granulocytes 10/25/2017 0.2  0.0 - 0.5 % Final    Gran # 10/25/2017 5.2  1.8 - 7.7 K/uL Final    Immature Grans (Abs) 10/25/2017 0.02  0.00 - 0.04 K/uL Final    Lymph # 10/25/2017 1.9  1.0 - 4.8 K/uL Final    Mono # 10/25/2017 0.9  0.3 - 1.0 K/uL Final    Eos # 10/25/2017 0.5  0.0 - 0.5 K/uL Final    Baso # 10/25/2017 0.03  0.00 - 0.20 K/uL Final    nRBC 10/25/2017 0  0 /100 WBC Final    Gran% 10/25/2017 61.3  38.0 - 73.0 % Final    Lymph% 10/25/2017 22.4  18.0 - 48.0 % Final    Mono% 10/25/2017 10.3  4.0 - 15.0 % Final    Eosinophil% 10/25/2017 5.4  0.0 - 8.0 % Final    Basophil% 10/25/2017 0.4  0.0 - 1.9 % Final    Differential Method 10/25/2017 Automated   Final    Prothrombin Time 10/25/2017 9.6  9.0 - 12.5 sec Final    INR 10/25/2017 0.9  0.8 - 1.2 Final    Specimen UA 10/25/2017 Urine, Catheterized   Final    Color, UA 10/25/2017 Yellow  Yellow, Straw, Darline Final    Appearance, UA 10/25/2017 Clear  Clear Final    pH, UA 10/25/2017 7.0  5.0 - 8.0 Final    Specific Gravity, UA 10/25/2017 1.010  1.005 - 1.030 Final    Protein, UA 10/25/2017 Negative  Negative Final     Glucose, UA 10/25/2017 Negative  Negative Final    Ketones, UA 10/25/2017 Negative  Negative Final    Bilirubin (UA) 10/25/2017 Negative  Negative Final    Occult Blood UA 10/25/2017 1+* Negative Final    Nitrite, UA 10/25/2017 Negative  Negative Final    Urobilinogen, UA 10/25/2017 Negative  <2.0 EU/dL Final    Leukocytes, UA 10/25/2017 1+* Negative Final    RBC, UA 10/25/2017 14* 0 - 4 /hpf Final    WBC, UA 10/25/2017 7* 0 - 5 /hpf Final    Amorphous, UA 10/25/2017 Few  None-Moderate Final    Microscopic Comment 10/25/2017 SEE COMMENT   Final    Specimen UA 10/26/2017 Urine, Catheterized   Final    Color, UA 10/26/2017 Yellow  Yellow, Straw, Darline Final    Appearance, UA 10/26/2017 Cloudy* Clear Final    pH, UA 10/26/2017 6.0  5.0 - 8.0 Final    Specific Gravity, UA 10/26/2017 1.015  1.005 - 1.030 Final    Protein, UA 10/26/2017 2+* Negative Final    Glucose, UA 10/26/2017 Negative  Negative Final    Ketones, UA 10/26/2017 Negative  Negative Final    Bilirubin (UA) 10/26/2017 Negative  Negative Final    Occult Blood UA 10/26/2017 3+* Negative Final    Nitrite, UA 10/26/2017 Negative  Negative Final    Urobilinogen, UA 10/26/2017 Negative  <2.0 EU/dL Final    Leukocytes, UA 10/26/2017 3+* Negative Final    POCT Glucose 10/25/2017 123* 70 - 110 mg/dL Final    Prealbumin 10/26/2017 21  20 - 43 mg/dL Final    Procalcitonin 10/26/2017 0.13  <0.25 ng/mL Final    Transferrin 10/26/2017 216  200 - 375 mg/dL Final    Group & Rh 10/26/2017 O POS   Final    Indirect Abhishek 10/26/2017 NEG   Final    aPTT 10/26/2017 23.1  21.0 - 32.0 sec Final    Sodium 10/26/2017 135* 136 - 145 mmol/L Final    Potassium 10/26/2017 4.3  3.5 - 5.1 mmol/L Final    Chloride 10/26/2017 96  95 - 110 mmol/L Final    CO2 10/26/2017 27  23 - 29 mmol/L Final    Glucose 10/26/2017 125* 70 - 110 mg/dL Final    BUN, Bld 10/26/2017 23  8 - 23 mg/dL Final    Creatinine 10/26/2017 1.0  0.5 - 1.4 mg/dL Final     Calcium 10/26/2017 10.4  8.7 - 10.5 mg/dL Final    Total Protein 10/26/2017 6.4  6.0 - 8.4 g/dL Final    Albumin 10/26/2017 3.0* 3.5 - 5.2 g/dL Final    Total Bilirubin 10/26/2017 0.6  0.1 - 1.0 mg/dL Final    Alkaline Phosphatase 10/26/2017 96  55 - 135 U/L Final    AST 10/26/2017 25  10 - 40 U/L Final    ALT 10/26/2017 13  10 - 44 U/L Final    Anion Gap 10/26/2017 12  8 - 16 mmol/L Final    eGFR if  10/26/2017 57.3* >60 mL/min/1.73 m^2 Final    eGFR if non  10/26/2017 49.7* >60 mL/min/1.73 m^2 Final    Magnesium 10/26/2017 2.0  1.6 - 2.6 mg/dL Final    Phosphorus 10/26/2017 3.6  2.7 - 4.5 mg/dL Final    WBC 10/26/2017 15.36* 3.90 - 12.70 K/uL Final    RBC 10/26/2017 3.62* 4.00 - 5.40 M/uL Final    Hemoglobin 10/26/2017 11.3* 12.0 - 16.0 g/dL Final    Hematocrit 10/26/2017 35.0* 37.0 - 48.5 % Final    MCV 10/26/2017 97  82 - 98 fL Final    MCH 10/26/2017 31.2* 27.0 - 31.0 pg Final    MCHC 10/26/2017 32.3  32.0 - 36.0 g/dL Final    RDW 10/26/2017 13.9  11.5 - 14.5 % Final    Platelets 10/26/2017 300  150 - 350 K/uL Final    MPV 10/26/2017 10.4  9.2 - 12.9 fL Final    Immature Granulocytes 10/26/2017 0.5  0.0 - 0.5 % Final    Gran # 10/26/2017 12.3* 1.8 - 7.7 K/uL Final    Immature Grans (Abs) 10/26/2017 0.07* 0.00 - 0.04 K/uL Final    Lymph # 10/26/2017 1.7  1.0 - 4.8 K/uL Final    Mono # 10/26/2017 1.2* 0.3 - 1.0 K/uL Final    Eos # 10/26/2017 0.1  0.0 - 0.5 K/uL Final    Baso # 10/26/2017 0.03  0.00 - 0.20 K/uL Final    nRBC 10/26/2017 0  0 /100 WBC Final    Gran% 10/26/2017 80.0* 38.0 - 73.0 % Final    Lymph% 10/26/2017 10.9* 18.0 - 48.0 % Final    Mono% 10/26/2017 8.1  4.0 - 15.0 % Final    Eosinophil% 10/26/2017 0.3  0.0 - 8.0 % Final    Basophil% 10/26/2017 0.2  0.0 - 1.9 % Final    Differential Method 10/26/2017 Automated   Final    Vit D, 25-Hydroxy 10/26/2017 48  30 - 96 ng/mL Final    RBC, UA 10/26/2017 91* 0 - 4 /hpf Final    WBC, UA  10/26/2017 98* 0 - 5 /hpf Final    Bacteria, UA 10/26/2017 Moderate* None-Occ /hpf Final    Squam Epithel, UA 10/26/2017 1  /hpf Final    Hyaline Casts, UA 10/26/2017 2* 0-1/lpf /lpf Final    Microscopic Comment 10/26/2017 SEE COMMENT   Final    Urine Culture, Routine 10/27/2017 No growth   Final    WBC 10/26/2017 12.64  3.90 - 12.70 K/uL Final    RBC 10/26/2017 2.80* 4.00 - 5.40 M/uL Final    Hemoglobin 10/26/2017 8.6* 12.0 - 16.0 g/dL Final    Hematocrit 10/26/2017 27.5* 37.0 - 48.5 % Final    MCV 10/26/2017 98  82 - 98 fL Final    MCH 10/26/2017 30.7  27.0 - 31.0 pg Final    MCHC 10/26/2017 31.3* 32.0 - 36.0 g/dL Final    RDW 10/26/2017 14.1  11.5 - 14.5 % Final    Platelets 10/26/2017 222  150 - 350 K/uL Final    MPV 10/26/2017 9.7  9.2 - 12.9 fL Final    Immature Granulocytes 10/26/2017 0.3  0.0 - 0.5 % Final    Gran # 10/26/2017 10.5* 1.8 - 7.7 K/uL Final    Immature Grans (Abs) 10/26/2017 0.04  0.00 - 0.04 K/uL Final    Lymph # 10/26/2017 1.1  1.0 - 4.8 K/uL Final    Mono # 10/26/2017 1.0  0.3 - 1.0 K/uL Final    Eos # 10/26/2017 0.0  0.0 - 0.5 K/uL Final    Baso # 10/26/2017 0.02  0.00 - 0.20 K/uL Final    nRBC 10/26/2017 0  0 /100 WBC Final    Gran% 10/26/2017 82.8* 38.0 - 73.0 % Final    Lymph% 10/26/2017 8.8* 18.0 - 48.0 % Final    Mono% 10/26/2017 7.7  4.0 - 15.0 % Final    Eosinophil% 10/26/2017 0.2  0.0 - 8.0 % Final    Basophil% 10/26/2017 0.2  0.0 - 1.9 % Final    Differential Method 10/26/2017 Automated   Final    Sodium 10/27/2017 138  136 - 145 mmol/L Final    Potassium 10/27/2017 4.1  3.5 - 5.1 mmol/L Final    Chloride 10/27/2017 102  95 - 110 mmol/L Final    CO2 10/27/2017 26  23 - 29 mmol/L Final    Glucose 10/27/2017 115* 70 - 110 mg/dL Final    BUN, Bld 10/27/2017 29* 8 - 23 mg/dL Final    Creatinine 10/27/2017 1.3  0.5 - 1.4 mg/dL Final    Calcium 10/27/2017 8.6* 8.7 - 10.5 mg/dL Final    Total Protein 10/27/2017 5.3* 6.0 - 8.4 g/dL Final    Albumin  10/27/2017 2.4* 3.5 - 5.2 g/dL Final    Total Bilirubin 10/27/2017 0.3  0.1 - 1.0 mg/dL Final    Alkaline Phosphatase 10/27/2017 83  55 - 135 U/L Final    AST 10/27/2017 36  10 - 40 U/L Final    ALT 10/27/2017 18  10 - 44 U/L Final    Anion Gap 10/27/2017 10  8 - 16 mmol/L Final    eGFR if  10/27/2017 41.7* >60 mL/min/1.73 m^2 Final    eGFR if non  10/27/2017 36.2* >60 mL/min/1.73 m^2 Final    Magnesium 10/27/2017 1.9  1.6 - 2.6 mg/dL Final    Phosphorus 10/27/2017 4.1  2.7 - 4.5 mg/dL Final    WBC 10/27/2017 13.74* 3.90 - 12.70 K/uL Final    RBC 10/27/2017 2.41* 4.00 - 5.40 M/uL Final    Hemoglobin 10/27/2017 7.6* 12.0 - 16.0 g/dL Final    Hematocrit 10/27/2017 24.3* 37.0 - 48.5 % Final    MCV 10/27/2017 101* 82 - 98 fL Final    MCH 10/27/2017 31.5* 27.0 - 31.0 pg Final    MCHC 10/27/2017 31.3* 32.0 - 36.0 g/dL Final    RDW 10/27/2017 14.1  11.5 - 14.5 % Final    Platelets 10/27/2017 185  150 - 350 K/uL Final    MPV 10/27/2017 9.9  9.2 - 12.9 fL Final    Immature Granulocytes 10/27/2017 0.6* 0.0 - 0.5 % Final    Gran # 10/27/2017 10.6* 1.8 - 7.7 K/uL Final    Immature Grans (Abs) 10/27/2017 0.08* 0.00 - 0.04 K/uL Final    Lymph # 10/27/2017 1.8  1.0 - 4.8 K/uL Final    Mono # 10/27/2017 1.3* 0.3 - 1.0 K/uL Final    Eos # 10/27/2017 0.0  0.0 - 0.5 K/uL Final    Baso # 10/27/2017 0.02  0.00 - 0.20 K/uL Final    nRBC 10/27/2017 0  0 /100 WBC Final    Gran% 10/27/2017 76.8* 38.0 - 73.0 % Final    Lymph% 10/27/2017 13.0* 18.0 - 48.0 % Final    Mono% 10/27/2017 9.3  4.0 - 15.0 % Final    Eosinophil% 10/27/2017 0.2  0.0 - 8.0 % Final    Basophil% 10/27/2017 0.1  0.0 - 1.9 % Final    Differential Method 10/27/2017 Automated   Final        Meds   Current Facility-Administered Medications   Medication Dose Route Frequency Provider Last Rate Last Dose    acetaminophen tablet 1,000 mg  1,000 mg Oral Q6H Fletcher Polk MD   1,000 mg at 10/27/17 5306     acetaminophen tablet 650 mg  650 mg Oral Q8H PRN Maria Del Rosario Hackett MD        bisacodyl suppository 10 mg  10 mg Rectal Daily PRN Fletcher Polk MD        calcium-vitamin D3 500 mg(1,250mg) -200 unit per tablet 1 tablet  1 tablet Oral BID Maria Del Rosario Hackett MD   1 tablet at 10/27/17 2310    cefTRIAXone (ROCEPHIN) 1 g in dextrose 5 % 50 mL IVPB  1 g Intravenous Q24H Elma Harris MD   1 g at 10/27/17 1618    dextrose 50% injection 12.5 g  12.5 g Intravenous PRN Maria Del Rosario Hackett MD        dextrose 50% injection 25 g  25 g Intravenous PRN Maria Del Rosario Hackett MD        enoxaparin injection 40 mg  40 mg Subcutaneous Daily Tristan Vaughn MD   40 mg at 10/27/17 1618    glucagon (human recombinant) injection 1 mg  1 mg Intramuscular PRN Maria Del Rosario Hackett MD        glucose chewable tablet 16 g  16 g Oral PRN Maria Del Rosario Hackett MD        glucose chewable tablet 24 g  24 g Oral PRN Maria Del Rosario Hackett MD        ibuprofen tablet 600 mg  600 mg Oral Q6H PRN Maria Del Rosario Hackett MD        influenza (FLUZONE HIGH-DOSE) vaccine 0.5 mL  0.5 mL Intramuscular Prior to discharge Derek Verdin MD        methocarbamol tablet 1,000 mg  1,000 mg Oral Q6H PRN Fletcher Polk MD        nicotine 14 mg/24 hr 1 patch  1 patch Transdermal Daily Fletcher Polk MD   1 patch at 10/27/17 1045    ondansetron disintegrating tablet 8 mg  8 mg Oral Q8H PRN Maria Del Rosario Hackett MD        ondansetron injection 4 mg  4 mg Intravenous Q12H PRN Fletcher Polk MD        ondansetron injection 8 mg  8 mg Intravenous Q8H PRN Maria Del Rosario Hackett MD        pantoprazole EC tablet 40 mg  40 mg Oral Daily Maria Del Rosario Hackett MD   40 mg at 10/27/17 1047    polyethylene glycol packet 17 g  17 g Oral Daily Fletcher Polk MD   17 g at 10/27/17 1047    polymyxin B sulf-trimethoprim 10,000 unit- 1 mg/mL ophthalmic drops 1 drop  1 drop Left Eye TID Maria Del Rosario Hackett MD   1 drop at 10/27/17 0487    pregabalin capsule 75 mg  75 mg Oral QHS Fletcher Aly  MD Jam   75 mg at 10/27/17 2310    promethazine (PHENERGAN) 6.25 mg in dextrose 5 % 50 mL IVPB  6.25 mg Intravenous Q6H PRN Fletcher Polk MD        ramelteon tablet 8 mg  8 mg Oral Nightly PRN Fletcher Polk MD        ropivacaine (PF) 2 mg/ml (0.2%) infusion  10 mL/hr Perineural Continuous Fletcher Polk MD 10 mL/hr at 10/27/17 1618 10 mL/hr at 10/27/17 1618    senna-docusate 8.6-50 mg per tablet 1 tablet  1 tablet Oral BID Fletcher Polk MD   1 tablet at 10/27/17 2311    simvastatin tablet 40 mg  40 mg Oral QHS Maria Del Rosario Hackett MD   40 mg at 10/27/17 2310    sodium chloride 0.9% injection 3 mL  3 mL Intravenous Q8H PRN Fletcher Polk MD        traMADol tablet 50 mg  50 mg Oral Q6H PRN Elma Harris MD   50 mg at 10/27/17 2124        Anticoagulant dose lovenox at 40mg.    Assessment:     Pain control adequate    Plan:     PNC paused by RN early this am. Will follow up with patient and if comfortable will remove PNC - if uncomfortable, will bolus and restart catheter.    Evaluator Josiah Domínguez        I have reviewed and concur with the resident's history, physical, assessment, and plan.  I have personally interviewed and examined the patient at bedside.  See below addendum for my evaluation and additional findings.    Patient not likely going to SNF today. If she is having pain, will bolus and restart catheter.

## 2017-10-28 NOTE — PT/OT/SLP PROGRESS
Physical Therapy         Treatment        Simona Bassett   MRN: [id number]     PT Received On: 10/28/17  PT Start Time: 1105     PT Stop Time: 1120    PT Total Time (min): 15 min       Billable Minutes:  Evaluation 15      General Precautions: Standard, fall  Orthopedic Precautions : LLE toe touch weight bearing  Braces: N/A    Subjective:  Communicated with RN prior to session.    Pt agreeable to PT session.  Family members present and eager to participate in care.     Pain Rating 1: 0/10   Pain Rating Post-Intervention 1: 0/10    Objective:  Patient found with: peripheral IV, oxygen, perineural catheter, FCD    Pt found supine in bed.      Functional Mobility:    Bed Mobility:  Scooting/Bridging: Maximum Assistance (to EOB)  Supine to Sit: Moderate Assistance    Transfers:    Sit <> Stand Assistance: Maximum Assistance  Sit <> Stand Assistive Device: No Assistive Device  Bed <> Chair Technique: Stand Pivot  Bed <> Chair Assistance: Maximum Assistance  Bed <> Chair Assistive Device: No Assistive Device    Ambulation:    Gait Distance: unable to perform    Balance:   Static Sit: FAIR-: Maintains without assist but inconsistent   Dynamic Sit:  FAIR: Cannot move trunk without losing balance  Static Stand: POOR: Needs MODERATE assist to maintain  Dynamic stand: 0: N/A    Therapeutic Activities and Exercises:  Discussed POC with patient and family.  Both able to verbalize understanding.      Pt safe to transfer with RN staff    Patient left up in chair with all lines intact, call button in reach and RN notified.    AM-PAC 6 CLICK MOBILITY  1 = Unable, Total/Dependent Assistance  2 = A lot, Maximum/Moderate Assistance  3 = A little, Minimum/Contact Guard/Supervision  4 = None, Modified [place]/Independent  Turning over in bed (including adjusting bedclothes, sheets and blankets)?: 2  Sitting down on and standing up from a chair with arms (e.g., wheelchair, bedside commode, etc.): 2  Moving from lying on back to  sitting on the side of the bed?: 2  Moving to and from a bed to a chair (including a wheelchair)?: 2  Need to walk in hospital room?: 1  Climbing 3-5 steps with a railing?: 1  Total Score: 10    AM-PAC Raw Score   CMS G-Code Modifier   Level of Impairment   Assistance     6   CN   100%         Total / Unable   7 - 9   CM   80 - 100%   Maximal Assist     10 - 14   CL   60 - 80%   Moderate Assist     15 - 19   CK   40 - 60%   Moderate Assist     20 - 22   CJ   20 - 40%   Minimal Assist     23   CI   1-20%         SBA / CGA     24 CH   0%   Independent/Modified Independent       Assessment:  Simona Bassett is a 89 y.o. female with a medical diagnosis of Closed displaced intertrochanteric fracture of left femur with routine healing. She presents with deficits listed below.  Pt tolerated treatment well today and is making slow progress towards goals.  Pt will continue to benefit from skilled PT services to address the below deficits and to increase functional independence.      Rehab identified problem list/impairments: impaired endurance, weakness, impaired functional mobilty, gait instability, impaired balance, decreased lower extremity function, edema, decreased ROM, orthopedic precautions    Rehab potential is fair.    Activity tolerance: Fair    Discharge Facility/Level Of Care Needs: nursing facility, skilled     Barriers to Discharge: Decreased caregiver support    Equipment Needed After Discharge:  (TBD)     GOALS:    Physical Therapy Goals        Problem: Physical Therapy Goal    Goal Priority Disciplines Outcome Goal Variances Interventions   Physical Therapy Goal     PT/OT, PT Ongoing (interventions implemented as appropriate)     Description:  Goals to be met by: 17     Patient will increase functional independence with mobility by performin. Supine to sit with MInimal Assistance  2. Sit to supine with MInimal Assistance  3. Sit to stand transfer with Minimal Assistance  4. Bed to chair  transfer with Minimal Assistance using Rolling Walker  5. Gait  x 15 feet with Minimal Assistance using Rolling Walker.   6. Lower extremity exercise program x 30 reps per handout, with independence                      PLAN:    Patient to be seen daily  to address the above listed problems via gait training, therapeutic activities, therapeutic exercises, neuromuscular re-education  Plan of Care Expires: 11/26/17  Plan of Care reviewed with: patient    Erik Kang, PT, DPT  10/28/2017   [phone]

## 2017-10-28 NOTE — ASSESSMENT & PLAN NOTE
On admission to hospital, patient was noted to have Hgb of 11.3. After surgery patient's Hgb level has dropped to 6.5 on 10/28 and expected blood loss related to surgery and hip fracture. Patient has no signs of active bleeding and hemodynamically stable. Plan to transfuse 1 unit of PRBCs on 10/28 with goal to keep Hgb > 7. Monitor daily H/H post-op.

## 2017-10-28 NOTE — PROGRESS NOTES
[facility] Hospital Medicine  Progress Note    Patient Name: Simona Bassett  MRN: [id number]  [facility] Medicine Service: H  Admission Date: 10/25/2017  Length of Stay: 3 days  Expected Discharge Date: 10/31/2017  Attending Physician: Elma Harris MD  Primary Care Provider: Twila Loera MD    Subjective:     2 Days Post-Op from left hip cephalomedullary nail fixation for fracture     Orthopedic Surgeon: Dr. Isiah Quinonez  Weight Bearing Status: TTWB left lower extremity    Follow-up Visit For: Closed displaced intertrochanteric fracture of left femur with routine healing    HPI:  Ms. Simona Bassett is a 89 y/o female with osteoporosis, GERD, HLP and recent left ankle fracture who presents to the ED after she had a fall in her house today. The history is obtained by the daughter as the patient is hard of hearing. The daughter mentions that she was in her bedroom with her hands off of the walker trying to get her diapers, when she lost her balance and fell on her left hip and left elbow. After the fall, she was unable to ambulate. She normally ambulates around the house using a walker.  The patient claims she didn't hit her head when she fell. She does take Aspirin 81mg daily, last dose was on 10/24. She denies any numbness or tingling in her left leg. X-rays done in ER revealed nondisplaced fracture base of fifth metatarsal on left foot and comminuted impacted intertrochanteric fracture of the left femur.    Interval History: Patient with no hypotension overnight. Patient slept well. Patient denies any active left hip pain this am and patient's son at bedside. Discussed lab results with patient and son and Hgb noted to be 6.5 and discussed blood transfusion with patient and patient agreeable.     Review of Systems   Constitutional: Positive for appetite change (Poor appetite). Negative for fatigue and fever.   HENT: Positive for hearing loss (Patient very hard of hearing and uses  hearing aids).    Respiratory: Negative for cough, chest tightness and shortness of breath.    Cardiovascular: Negative for chest pain and leg swelling.   Gastrointestinal: Negative for abdominal pain, nausea and vomiting.   Genitourinary: Negative for dysuria.   Musculoskeletal: Positive for back pain (Low back). Negative for arthralgias.   Skin: Positive for pallor. Negative for rash.   Neurological: Negative for dizziness and light-headedness.   Psychiatric/Behavioral: Negative for confusion and hallucinations.     Objective:     Vital Signs (Most Recent):  Temp: (!) 95.7 °F (35.4 °C) (10/28/17 1124)  Pulse: 70 (10/28/17 1124)  Resp: 18 (10/28/17 1124)  BP: (!) 103/52 (10/28/17 1124)  SpO2: 98 % (10/28/17 1124) Vital Signs (24h Range):  Temp:  [95.7 °F (35.4 °C)-97.6 °F (36.4 °C)] 95.7 °F (35.4 °C)  Pulse:  [70-84] 70  Resp:  [16-18] 18  SpO2:  [81 %-100 %] 98 %  BP: ()/(36-66) 103/52     Weight: 43.1 kg (95 lb)  Body mass index is 19.19 kg/m².    Intake/Output Summary (Last 24 hours) at 10/28/17 1131  Last data filed at 10/28/17 0500   Gross per 24 hour   Intake              900 ml   Output                0 ml   Net              900 ml      Physical Exam   Constitutional: No distress.   Thin and frail elderly female   HENT:   Mouth/Throat: Oropharynx is clear and moist.   Eyes: Conjunctivae are normal.   Cardiovascular: Normal rate, regular rhythm and normal heart sounds.  Exam reveals no gallop and no friction rub.    No murmur heard.  Pulmonary/Chest: Effort normal and breath sounds normal. She has no wheezes.   Abdominal: Soft. Bowel sounds are normal. She exhibits no distension. There is no tenderness.   Musculoskeletal: She exhibits no edema.   Neurological: She is alert.   Oriented to self and place but not time   Skin: Skin is warm. Capillary refill takes less than 2 seconds. No erythema.   Surgical bandage in place to left hip    Psychiatric: She has a normal mood and affect. Her behavior is  normal.       Significant Labs:   CBC:   Recent Labs  Lab 10/26/17  1917 10/27/17  0513 10/28/17  0549   WBC 12.64 13.74* 9.10   HGB 8.6* 7.6* 6.5*   HCT 27.5* 24.3* 21.2*    185 164     CMP:   Recent Labs  Lab 10/27/17  0513 10/28/17  0549    138   K 4.1 3.9    104   CO2 26 26   * 89   BUN 29* 28*   CREATININE 1.3 1.0   CALCIUM 8.6* 8.1*   PROT 5.3* 4.9*   ALBUMIN 2.4* 2.1*   BILITOT 0.3 0.2   ALKPHOS 83 81   AST 36 36   ALT 18 <5*   ANIONGAP 10 8   EGFRNONAA 36.2* 49.7*      Urine Culture, Routine   Date Value Ref Range Status   10/26/2017 No growth  Final     Significant Imaging: I have reviewed all pertinent imaging results/findings within the past 24 hours.    ASSESSMENT/PLAN:     * Closed displaced intertrochanteric fracture of left femur with routine healing s/p IM tayo on 10/26/2017    Encounter for aftercare for healing closed traumatic fracture of left hip  Patient is POD # 2. Patient reports no pain in left hip.   · Plan is to continue PT/OT for gait training and strengthening and restoration of ADL's.   · Patient is TTWB: left lower extremity as per Orthopedic recommendations.   · Plan is to continue Lovenox 40 mg subcutaneous daily and will need a total of 4 weeks post-op after hip fracture surgery for DVT prophylaxis.   · Orthopedics is following and managing hip fracture and surgical site. Perineural pain catheter in place for pain control and being managed by Anesthesia Pain Service.   · Skilled nursing consult placed as patient will need skilled care upon discharge from the hospital and  notified and working on choices with family and referrals sent to [facility], [facility] and [facility].        Acute blood loss as cause of postoperative anemia    On admission to hospital, patient was noted to have Hgb of 11.3. After surgery patient's Hgb level has dropped to 6.5 on 10/28 and expected blood loss related to surgery and hip fracture. Patient has  no signs of active bleeding and hemodynamically stable. Plan to transfuse 1 unit of PRBCs on 10/28 with goal to keep Hgb > 7. Monitor daily H/H post-op.         Acute cystitis with hematuria    Patient on admit U/A with 98 WBCs and moderate bacteria and negative nitrite consistent with likely UTI. Final urine culture results with no growth. Patient received 2 days of IV Rocephin and will discontinue as urine culture no growth and patient asymptomatic. Patient with no signs to suggest sepsis.          Osteoporosis, post-menopausal    · Patient admitted with hip fracture related to osteoporosis. Patient will need outpatient DEXA scan and further evaluation for additional treatment of osteoporosis. Will refer patient to [facility] on discharge for further evaluation and management of osteoporosis.    · Continue Calcium and Vitamin D to treat.   · Vitamin D level is adequate at 48 on this admit so no additional Vitamin D replacement needed.           Mixed hyperlipidemia    · Controlled.   · Continue Simvastatin to treat as patient taking at home.           Gastroesophageal reflux disease without esophagitis    · Controlled. Continue Protonix 40 mg po daily to treat in the hospital.           Tobacco abuse    · Patient smokes 1PPD x 60 years of cigarettes.  · Nicotine patch 21 mg daily while hospitalized.   · Incentive spirometry post-op to prevent post-op pulmonary complications in known active smoker.           Constipation due to pain medication    Patient with known chronic constipation. Will continue on daily Senakot and Miralax to treat.         Poor appetite    · Chronic issue that was present on admit as family reports poor appetite at home.   · Prealbumin 21 and adequate on admit.   · Boost 1 can with meals to help with nutrition.         Closed fracture of base of fifth metatarsal bone    Orthopedics evaluated. No surgical intervention needed. Pain control.                  Anticipated  Disposition: Skilled Nursing Facility    Time spent in care of patient (Greater than 1/2 spent in direct face-to-face contact): 20 minutes      Elma Harris MD  Department of Hospital Medicine   [facility]

## 2017-10-28 NOTE — ASSESSMENT & PLAN NOTE
Patient on admit U/A with 98 WBCs and moderate bacteria and negative nitrite consistent with likely UTI. Final urine culture results with no growth. Patient received 2 days of IV Rocephin and will discontinue as urine culture no growth and patient asymptomatic. Patient with no signs to suggest sepsis.

## 2017-10-28 NOTE — SUBJECTIVE & OBJECTIVE
Interval History: Patient with no hypotension overnight. Patient slept well. Patient denies any active left hip pain this am and patient's son at bedside. Discussed lab results with patient and son and Hgb noted to be 6.5 and discussed blood transfusion with patient and patient agreeable.     Review of Systems   Constitutional: Positive for appetite change (Poor appetite). Negative for fatigue and fever.   HENT: Positive for hearing loss (Patient very hard of hearing and uses hearing aids).    Respiratory: Negative for cough, chest tightness and shortness of breath.    Cardiovascular: Negative for chest pain and leg swelling.   Gastrointestinal: Negative for abdominal pain, nausea and vomiting.   Genitourinary: Negative for dysuria.   Musculoskeletal: Positive for back pain (Low back). Negative for arthralgias.   Skin: Positive for pallor. Negative for rash.   Neurological: Negative for dizziness and light-headedness.   Psychiatric/Behavioral: Negative for confusion and hallucinations.     Objective:     Vital Signs (Most Recent):  Temp: (!) 95.7 °F (35.4 °C) (10/28/17 1124)  Pulse: 70 (10/28/17 1124)  Resp: 18 (10/28/17 1124)  BP: (!) 103/52 (10/28/17 1124)  SpO2: 98 % (10/28/17 1124) Vital Signs (24h Range):  Temp:  [95.7 °F (35.4 °C)-97.6 °F (36.4 °C)] 95.7 °F (35.4 °C)  Pulse:  [70-84] 70  Resp:  [16-18] 18  SpO2:  [81 %-100 %] 98 %  BP: ()/(36-66) 103/52     Weight: 43.1 kg (95 lb)  Body mass index is 19.19 kg/m².    Intake/Output Summary (Last 24 hours) at 10/28/17 1131  Last data filed at 10/28/17 0500   Gross per 24 hour   Intake              900 ml   Output                0 ml   Net              900 ml      Physical Exam   Constitutional: No distress.   Thin and frail elderly female   HENT:   Mouth/Throat: Oropharynx is clear and moist.   Eyes: Conjunctivae are normal.   Cardiovascular: Normal rate, regular rhythm and normal heart sounds.  Exam reveals no gallop and no friction rub.    No murmur  heard.  Pulmonary/Chest: Effort normal and breath sounds normal. She has no wheezes.   Abdominal: Soft. Bowel sounds are normal. She exhibits no distension. There is no tenderness.   Musculoskeletal: She exhibits no edema.   Neurological: She is alert.   Oriented to self and place but not time   Skin: Skin is warm. Capillary refill takes less than 2 seconds. No erythema.   Surgical bandage in place to left hip    Psychiatric: She has a normal mood and affect. Her behavior is normal.       Significant Labs:   CBC:   Recent Labs  Lab 10/26/17  1917 10/27/17  0513 10/28/17  0549   WBC 12.64 13.74* 9.10   HGB 8.6* 7.6* 6.5*   HCT 27.5* 24.3* 21.2*    185 164     CMP:   Recent Labs  Lab 10/27/17  0513 10/28/17  0549    138   K 4.1 3.9    104   CO2 26 26   * 89   BUN 29* 28*   CREATININE 1.3 1.0   CALCIUM 8.6* 8.1*   PROT 5.3* 4.9*   ALBUMIN 2.4* 2.1*   BILITOT 0.3 0.2   ALKPHOS 83 81   AST 36 36   ALT 18 <5*   ANIONGAP 10 8   EGFRNONAA 36.2* 49.7*      Urine Culture, Routine   Date Value Ref Range Status   10/26/2017 No growth  Final     Significant Imaging: I have reviewed all pertinent imaging results/findings within the past 24 hours.

## 2017-10-29 PROBLEM — E83.39 HYPOPHOSPHATEMIA: Status: ACTIVE | Noted: 2017-10-29

## 2017-10-29 PROBLEM — N30.01 ACUTE CYSTITIS WITH HEMATURIA: Status: RESOLVED | Noted: 2017-10-26 | Resolved: 2017-10-29

## 2017-10-29 LAB
ALBUMIN SERPL BCP-MCNC: 2.1 G/DL
ALP SERPL-CCNC: 91 U/L
ALT SERPL W/O P-5'-P-CCNC: <5 U/L
ANION GAP SERPL CALC-SCNC: 7 MMOL/L
AST SERPL-CCNC: 34 U/L
BASOPHILS # BLD AUTO: 0.03 K/UL
BASOPHILS NFR BLD: 0.3 %
BILIRUB SERPL-MCNC: 0.5 MG/DL
BUN SERPL-MCNC: 25 MG/DL
CALCIUM SERPL-MCNC: 8.5 MG/DL
CHLORIDE SERPL-SCNC: 103 MMOL/L
CO2 SERPL-SCNC: 27 MMOL/L
CREAT SERPL-MCNC: 0.8 MG/DL
DIFFERENTIAL METHOD: ABNORMAL
EOSINOPHIL # BLD AUTO: 0.3 K/UL
EOSINOPHIL NFR BLD: 3.6 %
ERYTHROCYTE [DISTWIDTH] IN BLOOD BY AUTOMATED COUNT: 15.2 %
EST. GFR  (AFRICAN AMERICAN): >60 ML/MIN/1.73 M^2
EST. GFR  (NON AFRICAN AMERICAN): >60 ML/MIN/1.73 M^2
GLUCOSE SERPL-MCNC: 72 MG/DL
HCT VFR BLD AUTO: 26.8 %
HGB BLD-MCNC: 8.9 G/DL
IMM GRANULOCYTES # BLD AUTO: 0.08 K/UL
IMM GRANULOCYTES NFR BLD AUTO: 0.9 %
LYMPHOCYTES # BLD AUTO: 1.5 K/UL
LYMPHOCYTES NFR BLD: 16.3 %
MAGNESIUM SERPL-MCNC: 1.8 MG/DL
MCH RBC QN AUTO: 31.3 PG
MCHC RBC AUTO-ENTMCNC: 33.2 G/DL
MCV RBC AUTO: 94 FL
MONOCYTES # BLD AUTO: 1.1 K/UL
MONOCYTES NFR BLD: 11.5 %
NEUTROPHILS # BLD AUTO: 6.2 K/UL
NEUTROPHILS NFR BLD: 67.4 %
NRBC BLD-RTO: 0 /100 WBC
PHOSPHATE SERPL-MCNC: 1.8 MG/DL
PLATELET # BLD AUTO: 143 K/UL
PMV BLD AUTO: ABNORMAL FL
POTASSIUM SERPL-SCNC: 4 MMOL/L
PROT SERPL-MCNC: 5.1 G/DL
RBC # BLD AUTO: 2.84 M/UL
SODIUM SERPL-SCNC: 137 MMOL/L
TB INDURATION 48 - 72 HR READ: 0 MM
WBC # BLD AUTO: 9.16 K/UL

## 2017-10-29 PROCEDURE — 11000001 HC ACUTE MED/SURG PRIVATE ROOM

## 2017-10-29 PROCEDURE — 25000003 PHARM REV CODE 250: Performed by: HOSPITALIST

## 2017-10-29 PROCEDURE — 84100 ASSAY OF PHOSPHORUS: CPT

## 2017-10-29 PROCEDURE — 63600175 PHARM REV CODE 636 W HCPCS: Performed by: STUDENT IN AN ORGANIZED HEALTH CARE EDUCATION/TRAINING PROGRAM

## 2017-10-29 PROCEDURE — S4991 NICOTINE PATCH NONLEGEND: HCPCS | Performed by: STUDENT IN AN ORGANIZED HEALTH CARE EDUCATION/TRAINING PROGRAM

## 2017-10-29 PROCEDURE — 99232 SBSQ HOSP IP/OBS MODERATE 35: CPT | Mod: ,,, | Performed by: INTERNAL MEDICINE

## 2017-10-29 PROCEDURE — 99231 SBSQ HOSP IP/OBS SF/LOW 25: CPT | Mod: ,,, | Performed by: ANESTHESIOLOGY

## 2017-10-29 PROCEDURE — 85025 COMPLETE CBC W/AUTO DIFF WBC: CPT

## 2017-10-29 PROCEDURE — 27000221 HC OXYGEN, UP TO 24 HOURS

## 2017-10-29 PROCEDURE — 97535 SELF CARE MNGMENT TRAINING: CPT

## 2017-10-29 PROCEDURE — 25000003 PHARM REV CODE 250: Performed by: STUDENT IN AN ORGANIZED HEALTH CARE EDUCATION/TRAINING PROGRAM

## 2017-10-29 PROCEDURE — 80053 COMPREHEN METABOLIC PANEL: CPT

## 2017-10-29 PROCEDURE — 94761 N-INVAS EAR/PLS OXIMETRY MLT: CPT

## 2017-10-29 PROCEDURE — 36415 COLL VENOUS BLD VENIPUNCTURE: CPT

## 2017-10-29 PROCEDURE — 83735 ASSAY OF MAGNESIUM: CPT

## 2017-10-29 PROCEDURE — 97530 THERAPEUTIC ACTIVITIES: CPT

## 2017-10-29 PROCEDURE — 94799 UNLISTED PULMONARY SVC/PX: CPT

## 2017-10-29 PROCEDURE — 25000003 PHARM REV CODE 250: Performed by: INTERNAL MEDICINE

## 2017-10-29 RX ADMIN — SIMVASTATIN 40 MG: 20 TABLET, FILM COATED ORAL at 09:10

## 2017-10-29 RX ADMIN — OYSTER SHELL CALCIUM WITH VITAMIN D 1 TABLET: 500; 200 TABLET, FILM COATED ORAL at 08:10

## 2017-10-29 RX ADMIN — PREGABALIN 75 MG: 75 CAPSULE ORAL at 09:10

## 2017-10-29 RX ADMIN — ENOXAPARIN SODIUM 40 MG: 100 INJECTION SUBCUTANEOUS at 04:10

## 2017-10-29 RX ADMIN — ACETAMINOPHEN 1000 MG: 500 TABLET ORAL at 12:10

## 2017-10-29 RX ADMIN — POLYMYXIN B SULFATE, TRIMETHOPRIM SULFATE 1 DROP: 10000; 1 SOLUTION/ DROPS OPHTHALMIC at 01:10

## 2017-10-29 RX ADMIN — OYSTER SHELL CALCIUM WITH VITAMIN D 1 TABLET: 500; 200 TABLET, FILM COATED ORAL at 09:10

## 2017-10-29 RX ADMIN — POLYMYXIN B SULFATE, TRIMETHOPRIM SULFATE 1 DROP: 10000; 1 SOLUTION/ DROPS OPHTHALMIC at 06:10

## 2017-10-29 RX ADMIN — STANDARDIZED SENNA CONCENTRATE AND DOCUSATE SODIUM 1 TABLET: 8.6; 5 TABLET, FILM COATED ORAL at 08:10

## 2017-10-29 RX ADMIN — ACETAMINOPHEN 1000 MG: 500 TABLET ORAL at 06:10

## 2017-10-29 RX ADMIN — PANTOPRAZOLE SODIUM 40 MG: 40 TABLET, DELAYED RELEASE ORAL at 08:10

## 2017-10-29 RX ADMIN — NICOTINE 1 PATCH: 14 PATCH, EXTENDED RELEASE TRANSDERMAL at 09:10

## 2017-10-29 RX ADMIN — POLYETHYLENE GLYCOL 3350 17 G: 17 POWDER, FOR SOLUTION ORAL at 08:10

## 2017-10-29 RX ADMIN — STANDARDIZED SENNA CONCENTRATE AND DOCUSATE SODIUM 1 TABLET: 8.6; 5 TABLET, FILM COATED ORAL at 10:10

## 2017-10-29 RX ADMIN — POTASSIUM PHOSPHATE, MONOBASIC AND POTASSIUM PHOSPHATE, DIBASIC 30 MMOL: 224; 236 INJECTION, SOLUTION, CONCENTRATE INTRAVENOUS at 01:10

## 2017-10-29 RX ADMIN — POLYMYXIN B SULFATE, TRIMETHOPRIM SULFATE 1 DROP: 10000; 1 SOLUTION/ DROPS OPHTHALMIC at 09:10

## 2017-10-29 RX ADMIN — ACETAMINOPHEN 1000 MG: 500 TABLET ORAL at 11:10

## 2017-10-29 RX ADMIN — ROPIVACAINE HYDROCHLORIDE 10 ML/HR: 2 INJECTION, SOLUTION EPIDURAL; INFILTRATION at 06:10

## 2017-10-29 RX ADMIN — ACETAMINOPHEN 1000 MG: 500 TABLET ORAL at 05:10

## 2017-10-29 NOTE — ASSESSMENT & PLAN NOTE
Will treat with Potassium Phosphate 30 mmol IV for 1 dose(s) today and recheck level in the am.

## 2017-10-29 NOTE — ADDENDUM NOTE
Addendum  created 10/29/17 1131 by Chelo Zhou MD    Charge Capture section accepted, Sign clinical note

## 2017-10-29 NOTE — PLAN OF CARE
Problem: Occupational Therapy Goal  Goal: Occupational Therapy Goal  Goals to be met by: 10 days     Patient will increase functional independence with ADLs by performing:    UE Dressing with Stand-by Assistance.  LE Dressing with Moderate Assistance and Assistive Devices as needed.  Grooming while seated with Set-up Assistance.  Toileting from bedside commode with Moderate Assistance for hygiene and clothing management.   Stand pivot transfers with Minimal Assistance.  Toilet transfer to bedside commode with Minimal Assistance.     Outcome: Ongoing (interventions implemented as appropriate)  Goals revised and remain appropriate    Comments: Cont OT POC

## 2017-10-29 NOTE — ANESTHESIA POST-OP PAIN MANAGEMENT
Acute Pain Service Progress Note    Simona Bassett is a 89 y.o., female, [id number].    Surgery:  IM NAILING OF FEMUR - left - synthes - profx (Left)    Post Op Day #: 3     N o issues ON. Pain controlled. PNC paused this AM.    Catheter type: perineural  SIFI    Infusion type: Ropivacaine 0.2%  10 basal    Problem List:    There are no hospital problems to display for this patient.      Subjective:     General appearance of rouseable and responsive but sleepy   Pain with rest: 2    Faces   Pain with movement: 2    Faces   Side Effects    1. Pruritis No    2. Nausea No    3. Motor Blockade No, 0=Ability to raise lower extremities off bed    4. Sedation Yes, S=sleep, easy to arouse    Objective:        Catheter site clean, dry, intact      Vitals   There were no vitals filed for this visit.     Labs    Admission on 10/25/2017   No results displayed because visit has over 200 results.           Meds   No current facility-administered medications for this visit.      No current outpatient prescriptions on file.     Facility-Administered Medications Ordered in Other Visits   Medication Dose Route Frequency Provider Last Rate Last Dose    0.9%  NaCl infusion (for blood administration)   Intravenous Q24H PRN Elma Harris MD        acetaminophen tablet 1,000 mg  1,000 mg Oral Q6H Fletcher Polk MD   1,000 mg at 10/29/17 0636    bisacodyl suppository 10 mg  10 mg Rectal Daily PRN Fletcher Polk MD        calcium-vitamin D3 500 mg(1,250mg) -200 unit per tablet 1 tablet  1 tablet Oral BID Maria Del Rosario Hackett MD   1 tablet at 10/28/17 4677    dextrose 50% injection 12.5 g  12.5 g Intravenous PRN Maria Del Rosario Hackett MD        dextrose 50% injection 25 g  25 g Intravenous PRN Maria Del Rosario Hackett MD        enoxaparin injection 40 mg  40 mg Subcutaneous Daily Tristan Vaughn MD   40 mg at 10/28/17 1757    glucagon (human recombinant) injection 1 mg  1 mg Intramuscular PRN Maria Del Rosario Hackett MD        glucose chewable  tablet 16 g  16 g Oral PRN Maria Del Rosario Hackett MD        glucose chewable tablet 24 g  24 g Oral PRN Maria Del Rosario Hackett MD        influenza (FLUZONE HIGH-DOSE) vaccine 0.5 mL  0.5 mL Intramuscular Prior to discharge Derek Verdin MD        methocarbamol tablet 1,000 mg  1,000 mg Oral Q6H PRN Fletcher Polk MD        nicotine 14 mg/24 hr 1 patch  1 patch Transdermal Daily Fletcher Polk MD   1 patch at 10/28/17 0913    ondansetron injection 4 mg  4 mg Intravenous Q12H PRN Fletcher Polk MD        pantoprazole EC tablet 40 mg  40 mg Oral Daily Maria Del Rosario Hackett MD   40 mg at 10/28/17 0900    polyethylene glycol packet 17 g  17 g Oral Daily Fletcher Polk MD   17 g at 10/27/17 1047    polymyxin B sulf-trimethoprim 10,000 unit- 1 mg/mL ophthalmic drops 1 drop  1 drop Left Eye TID Maria Del Rosario Hackett MD   1 drop at 10/29/17 0636    pregabalin capsule 75 mg  75 mg Oral QHS Fletcher Polk MD   75 mg at 10/28/17 2217    ramelteon tablet 8 mg  8 mg Oral Nightly PRN Fletcher Polk MD        ropivacaine (PF) 2 mg/ml (0.2%) infusion  10 mL/hr Perineural Continuous Fletcher Polk MD 10 mL/hr at 10/28/17 2223 10 mL/hr at 10/28/17 2223    senna-docusate 8.6-50 mg per tablet 1 tablet  1 tablet Oral BID Fletcher Polk MD   1 tablet at 10/28/17 2217    simvastatin tablet 40 mg  40 mg Oral QHS Maria Del Rosario Hackett MD   40 mg at 10/28/17 2217    traMADol tablet 50 mg  50 mg Oral Q6H PRN Elma Harris MD   50 mg at 10/28/17 0913        Anticoagulant dose lovenox at 40mg.    Assessment:     Pain control adequate    Plan:    Catheter restarted 10/28. Pt sleeping comfortably. Will continue PNC until d/c.    Efrain Ocasio MD  PGY-4 Anesthesiology  P: [phone]      I have reviewed and concur with the resident's history, physical, assessment, and plan.  I have personally interviewed and examined the patient at bedside.  See below addendum for my evaluation and additional findings.    Patient doing  very well.  Resting comfortably.  Continue PNC until ready for discharge to SNF.  Can pause and pull prior to discharge. Dressing in tact

## 2017-10-29 NOTE — ASSESSMENT & PLAN NOTE
A/P: 89 y.o. female POD 3 s/p left CMN for intertrochanteric femur fracture     Continue close medical management given hypotensive episodes  PT/OT as tolerated.  Patient got out of bed and into chair yesterday please attempt this daily  CAM boot for pseudojones fracture vs no boot depending on how patient tolerates the boot  Postop abx in place  Lovenox 40mg qday for DVT     Dispo: Stable from orthopaedic management perspective. Appreciate medical management.

## 2017-10-29 NOTE — SUBJECTIVE & OBJECTIVE
Interval History: Patient denies any current left hip pain. Patient slept well overnight but continues to have poor appetite which is not new and is a chronic ongoing issue for patient even prior to this admission.     Review of Systems   Constitutional: Positive for appetite change (Poor appetite). Negative for fatigue and fever.   HENT: Positive for hearing loss (Patient very hard of hearing and uses hearing aids).    Respiratory: Negative for cough, chest tightness and shortness of breath.    Cardiovascular: Negative for chest pain and leg swelling.   Gastrointestinal: Negative for abdominal pain, nausea and vomiting.   Genitourinary: Negative for dysuria.   Musculoskeletal: Positive for back pain (Low back). Negative for arthralgias.   Skin: Positive for pallor. Negative for rash.   Neurological: Negative for dizziness and light-headedness.   Psychiatric/Behavioral: Negative for confusion and hallucinations.     Objective:     Vital Signs (Most Recent):  Temp: 96 °F (35.6 °C) (10/29/17 1208)  Pulse: 75 (10/29/17 1208)  Resp: 16 (10/29/17 1208)  BP: 122/60 (10/29/17 1208)  SpO2: (!) 92 % (10/29/17 1208) Vital Signs (24h Range):  Temp:  [95.4 °F (35.2 °C)-97.8 °F (36.6 °C)] 96 °F (35.6 °C)  Pulse:  [64-79] 75  Resp:  [16-20] 16  SpO2:  [92 %-100 %] 92 %  BP: ()/(50-60) 122/60     Weight: 43.1 kg (95 lb)  Body mass index is 19.19 kg/m².    Intake/Output Summary (Last 24 hours) at 10/29/17 1220  Last data filed at 10/29/17 0700   Gross per 24 hour   Intake              360 ml   Output                0 ml   Net              360 ml      Physical Exam   Constitutional: No distress.   Thin and frail elderly female   HENT:   Mouth/Throat: Oropharynx is clear and moist.   Eyes: Conjunctivae are normal.   Cardiovascular: Normal rate, regular rhythm and normal heart sounds.  Exam reveals no gallop and no friction rub.    No murmur heard.  Pulmonary/Chest: Effort normal and breath sounds normal. She has no wheezes.    Abdominal: Soft. Bowel sounds are normal. She exhibits no distension. There is no tenderness.   Musculoskeletal: She exhibits no edema.   Neurological: She is alert.   Oriented to self and place but not time   Skin: Skin is warm. Capillary refill takes less than 2 seconds. No erythema.   Surgical bandage in place to left hip    Psychiatric: She has a normal mood and affect. Her behavior is normal.       Significant Labs:   CBC:   Recent Labs  Lab 10/28/17  0549 10/29/17  0524   WBC 9.10 9.16   HGB 6.5* 8.9*   HCT 21.2* 26.8*    143*     CMP:   Recent Labs  Lab 10/28/17  0549 10/29/17  0524    137   K 3.9 4.0    103   CO2 26 27   GLU 89 72   BUN 28* 25*   CREATININE 1.0 0.8   CALCIUM 8.1* 8.5*   PROT 4.9* 5.1*   ALBUMIN 2.1* 2.1*   BILITOT 0.2 0.5   ALKPHOS 81 91   AST 36 34   ALT <5* <5*   ANIONGAP 8 7*   EGFRNONAA 49.7* >60.0     Magnesium:   Recent Labs  Lab 10/28/17  0549 10/29/17  0524   MG 1.8 1.8     Phosphorus 1.8     Significant Imaging: I have reviewed all pertinent imaging results/findings within the past 24 hours.

## 2017-10-29 NOTE — ASSESSMENT & PLAN NOTE
Improved. On admission to hospital, patient was noted to have Hgb of 11.3. After surgery patient's Hgb level has dropped to 6.5 on 10/28 and expected blood loss related to surgery and hip fracture. Patient has no signs of active bleeding and hemodynamically stable.   · Patient transfused 1 unit of PRBCs on 10/28 for Hgb 6.5 and Hgb improved to 8.9 after blood transfusion with appropriate response.   · Goal to keep Hgb > 7.   · Monitor daily H/H post-op.

## 2017-10-29 NOTE — SUBJECTIVE & OBJECTIVE
"Principal Problem:Closed displaced intertrochanteric fracture of left femur with routine healing    Principal Orthopedic Problem: same    Interval History: Patient seen and examined at bedside.  No acute events overnight.  Pain controlled.     Review of patient's allergies indicates:  No Known Allergies    Current Facility-Administered Medications   Medication    0.9%  NaCl infusion (for blood administration)    acetaminophen tablet 1,000 mg    bisacodyl suppository 10 mg    calcium-vitamin D3 500 mg(1,250mg) -200 unit per tablet 1 tablet    dextrose 50% injection 12.5 g    dextrose 50% injection 25 g    enoxaparin injection 40 mg    glucagon (human recombinant) injection 1 mg    glucose chewable tablet 16 g    glucose chewable tablet 24 g    influenza (FLUZONE HIGH-DOSE) vaccine 0.5 mL    methocarbamol tablet 1,000 mg    nicotine 14 mg/24 hr 1 patch    ondansetron injection 4 mg    pantoprazole EC tablet 40 mg    polyethylene glycol packet 17 g    polymyxin B sulf-trimethoprim 10,000 unit- 1 mg/mL ophthalmic drops 1 drop    pregabalin capsule 75 mg    ramelteon tablet 8 mg    ropivacaine (PF) 2 mg/ml (0.2%) infusion    senna-docusate 8.6-50 mg per tablet 1 tablet    simvastatin tablet 40 mg    traMADol tablet 50 mg     Objective:     Vital Signs (Most Recent):  Temp: 97 °F (36.1 °C) (10/29/17 0435)  Pulse: 64 (10/29/17 0435)  Resp: 17 (10/29/17 0435)  BP: (!) 95/50 (10/29/17 0435)  SpO2: 100 % (10/29/17 0435) Vital Signs (24h Range):  Temp:  [95.7 °F (35.4 °C)-97.8 °F (36.6 °C)] 97 °F (36.1 °C)  Pulse:  [64-79] 64  Resp:  [17-20] 17  SpO2:  [96 %-100 %] 100 %  BP: ()/(47-56) 95/50     Weight: 43.1 kg (95 lb)  Height: 4' 11" (149.9 cm)  Body mass index is 19.19 kg/m².    No intake or output data in the 24 hours ending 10/29/17 0646    Ortho/SPM Exam     AAOx4  NAD  RRR  No increased WOB  Aquacel c/d/i  SILT and motor intact T/SP/DP  WWP extremities  FCDs in place and " functioning    Significant Labs: All pertinent labs within the past 24 hours have been reviewed.    Significant Imaging: I have reviewed all pertinent imaging results/findings.

## 2017-10-29 NOTE — ASSESSMENT & PLAN NOTE
Resolved. Patient on admit U/A with 98 WBCs and moderate bacteria and negative nitrite consistent with likely UTI. Final urine culture results with no growth. Patient received 2 days of IV Rocephin and discontinued as urine culture no growth and patient asymptomatic. Patient with no signs to suggest sepsis.

## 2017-10-29 NOTE — PROGRESS NOTES
[facility] Hospital Medicine  Progress Note    Patient Name: Simona Bassett  MRN: [id number]  Hospital Medicine Service: H  Admission Date: 10/25/2017  Length of Stay: 4 days  Expected Discharge Date: 10/31/2017  Attending Physician: Elma Harris MD  Primary Care Provider: Twila Loera MD    Subjective:     3 Days Post-Op from left hip cephalomedullary nail fixation for fracture     Orthopedic Surgeon: Dr. Isiah Quinonez  Weight Bearing Status: TTWB left lower extremity    Follow-up Visit For: Closed displaced intertrochanteric fracture of left femur with routine healing    HPI:  Ms. Simona Bassett is a 89 y/o female with osteoporosis, GERD, HLP and recent left ankle fracture who presents to the ED after she had a fall in her house today. The history is obtained by the daughter as the patient is hard of hearing. The daughter mentions that she was in her bedroom with her hands off of the walker trying to get her diapers, when she lost her balance and fell on her left hip and left elbow. After the fall, she was unable to ambulate. She normally ambulates around the house using a walker.  The patient claims she didn't hit her head when she fell. She does take Aspirin 81mg daily, last dose was on 10/24. She denies any numbness or tingling in her left leg. X-rays done in ER revealed nondisplaced fracture base of fifth metatarsal on left foot and comminuted impacted intertrochanteric fracture of the left femur.    Interval History: Patient denies any current left hip pain. Patient slept well overnight but continues to have poor appetite which is not new and is a chronic ongoing issue for patient even prior to this admission.     Review of Systems   Constitutional: Positive for appetite change (Poor appetite). Negative for fatigue and fever.   HENT: Positive for hearing loss (Patient very hard of hearing and uses hearing aids).    Respiratory: Negative for cough, chest tightness and shortness  of breath.    Cardiovascular: Negative for chest pain and leg swelling.   Gastrointestinal: Negative for abdominal pain, nausea and vomiting.   Genitourinary: Negative for dysuria.   Musculoskeletal: Positive for back pain (Low back). Negative for arthralgias.   Skin: Positive for pallor. Negative for rash.   Neurological: Negative for dizziness and light-headedness.   Psychiatric/Behavioral: Negative for confusion and hallucinations.     Objective:     Vital Signs (Most Recent):  Temp: 96 °F (35.6 °C) (10/29/17 1208)  Pulse: 75 (10/29/17 1208)  Resp: 16 (10/29/17 1208)  BP: 122/60 (10/29/17 1208)  SpO2: (!) 92 % (10/29/17 1208) Vital Signs (24h Range):  Temp:  [95.4 °F (35.2 °C)-97.8 °F (36.6 °C)] 96 °F (35.6 °C)  Pulse:  [64-79] 75  Resp:  [16-20] 16  SpO2:  [92 %-100 %] 92 %  BP: ()/(50-60) 122/60     Weight: 43.1 kg (95 lb)  Body mass index is 19.19 kg/m².    Intake/Output Summary (Last 24 hours) at 10/29/17 1220  Last data filed at 10/29/17 0700   Gross per 24 hour   Intake              360 ml   Output                0 ml   Net              360 ml      Physical Exam   Constitutional: No distress.   Thin and frail elderly female   HENT:   Mouth/Throat: Oropharynx is clear and moist.   Eyes: Conjunctivae are normal.   Cardiovascular: Normal rate, regular rhythm and normal heart sounds.  Exam reveals no gallop and no friction rub.    No murmur heard.  Pulmonary/Chest: Effort normal and breath sounds normal. She has no wheezes.   Abdominal: Soft. Bowel sounds are normal. She exhibits no distension. There is no tenderness.   Musculoskeletal: She exhibits no edema.   Neurological: She is alert.   Oriented to self and place but not time   Skin: Skin is warm. Capillary refill takes less than 2 seconds. No erythema.   Surgical bandage in place to left hip    Psychiatric: She has a normal mood and affect. Her behavior is normal.       Significant Labs:   CBC:   Recent Labs  Lab 10/28/17  0549 10/29/17  0524   WBC  9.10 9.16   HGB 6.5* 8.9*   HCT 21.2* 26.8*    143*     CMP:   Recent Labs  Lab 10/28/17  0549 10/29/17  0524    137   K 3.9 4.0    103   CO2 26 27   GLU 89 72   BUN 28* 25*   CREATININE 1.0 0.8   CALCIUM 8.1* 8.5*   PROT 4.9* 5.1*   ALBUMIN 2.1* 2.1*   BILITOT 0.2 0.5   ALKPHOS 81 91   AST 36 34   ALT <5* <5*   ANIONGAP 8 7*   EGFRNONAA 49.7* >60.0     Magnesium:   Recent Labs  Lab 10/28/17  0549 10/29/17  0524   MG 1.8 1.8     Phosphorus 1.8     Significant Imaging: I have reviewed all pertinent imaging results/findings within the past 24 hours.    ASSESSMENT/PLAN:     * Closed displaced intertrochanteric fracture of left femur with routine healing s/p IM tayo on 10/26/2017    Encounter for aftercare for healing closed traumatic fracture of left hip  Patient is POD # 3. Patient reports no pain in left hip.   · Plan is to continue PT/OT for gait training and strengthening and restoration of ADL's.   · Patient is TTWB: left lower extremity as per Orthopedic recommendations.   · Plan is to continue Lovenox 40 mg subcutaneous daily and will need a total of 4 weeks post-op after hip fracture surgery for DVT prophylaxis.   · Orthopedics is following and managing hip fracture and surgical site. Perineural pain catheter in place for pain control and being managed by Anesthesia Pain Service.   · Skilled nursing consult placed as patient will need skilled care upon discharge from the hospital and  notified and working on choices with family and referrals sent to [facility], [facility] and [facility] Patient awaiting SNF placement and medically ready for discharge.        Acute blood loss as cause of postoperative anemia    Improved. On admission to hospital, patient was noted to have Hgb of 11.3. After surgery patient's Hgb level has dropped to 6.5 on 10/28 and expected blood loss related to surgery and hip fracture. Patient has no signs of active bleeding and hemodynamically  stable.   · Patient transfused 1 unit of PRBCs on 10/28 for Hgb 6.5 and Hgb improved to 8.9 after blood transfusion with appropriate response.   · Goal to keep Hgb > 7.   · Monitor daily H/H post-op.         Osteoporosis, post-menopausal    · Patient admitted with hip fracture related to osteoporosis. Patient will need outpatient DEXA scan and further evaluation for additional treatment of osteoporosis. Will refer patient to [facility] on discharge for further evaluation and management of osteoporosis.    · Continue Calcium and Vitamin D to treat.   · Vitamin D level is adequate at 48 on this admit so no additional Vitamin D replacement needed.           Hypophosphatemia    Will treat with Potassium Phosphate 30 mmol IV for 1 dose(s) today and recheck level in the am.           Mixed hyperlipidemia    · Controlled.   · Continue Simvastatin to treat as patient taking at home.           Gastroesophageal reflux disease without esophagitis    · Controlled. Continue Protonix 40 mg po daily to treat in the hospital.           Tobacco abuse    · Patient smokes 1PPD x 60 years of cigarettes.  · Nicotine patch 21 mg daily while hospitalized.   · Incentive spirometry post-op to prevent post-op pulmonary complications in known active smoker.           Constipation due to pain medication    Patient with known chronic constipation. Will continue on daily Senakot and Miralax to treat.         Poor appetite    · Chronic issue that was present on admit as family reports poor appetite at home.   · Prealbumin 21 and adequate on admit.   · Boost 1 can with meals to help with nutrition.         Closed fracture of base of fifth metatarsal bone    Orthopedics evaluated. No surgical intervention needed. Pain control.                  Anticipated Disposition: Skilled Nursing Facility on 10/30    Time spent in care of patient (Greater than 1/2 spent in direct face-to-face contact): 18 minutes      Elma Harris  MD  Department of Hospital Medicine   [facility]

## 2017-10-29 NOTE — PROGRESS NOTES
[facility]  Orthopedics  Progress Note    Patient Name: Simona Bassett  MRN: [id number]  Admission Date: 10/25/2017  Hospital Length of Stay: 4 days  Attending Provider: Elma Harris MD  Primary Care Provider: Twila Loera MD  Follow-up For: Procedure(s) (LRB):  IM NAILING OF FEMUR - left - synthes - profx (Left)    Post-Operative Day: 3 Days Post-Op  Subjective:     Principal Problem:Closed displaced intertrochanteric fracture of left femur with routine healing    Principal Orthopedic Problem: same    Interval History: Patient seen and examined at bedside.  No acute events overnight.  Pain controlled.     Review of patient's allergies indicates:  No Known Allergies    Current Facility-Administered Medications   Medication    0.9%  NaCl infusion (for blood administration)    acetaminophen tablet 1,000 mg    bisacodyl suppository 10 mg    calcium-vitamin D3 500 mg(1,250mg) -200 unit per tablet 1 tablet    dextrose 50% injection 12.5 g    dextrose 50% injection 25 g    enoxaparin injection 40 mg    glucagon (human recombinant) injection 1 mg    glucose chewable tablet 16 g    glucose chewable tablet 24 g    influenza (FLUZONE HIGH-DOSE) vaccine 0.5 mL    methocarbamol tablet 1,000 mg    nicotine 14 mg/24 hr 1 patch    ondansetron injection 4 mg    pantoprazole EC tablet 40 mg    polyethylene glycol packet 17 g    polymyxin B sulf-trimethoprim 10,000 unit- 1 mg/mL ophthalmic drops 1 drop    pregabalin capsule 75 mg    ramelteon tablet 8 mg    ropivacaine (PF) 2 mg/ml (0.2%) infusion    senna-docusate 8.6-50 mg per tablet 1 tablet    simvastatin tablet 40 mg    traMADol tablet 50 mg     Objective:     Vital Signs (Most Recent):  Temp: 97 °F (36.1 °C) (10/29/17 0435)  Pulse: 64 (10/29/17 0435)  Resp: 17 (10/29/17 0435)  BP: (!) 95/50 (10/29/17 0435)  SpO2: 100 % (10/29/17 0435) Vital Signs (24h Range):  Temp:  [95.7 °F (35.4 °C)-97.8 °F (36.6 °C)] 97 °F (36.1  "°C)  Pulse:  [64-79] 64  Resp:  [17-20] 17  SpO2:  [96 %-100 %] 100 %  BP: ()/(47-56) 95/50     Weight: 43.1 kg (95 lb)  Height: 4' 11" (149.9 cm)  Body mass index is 19.19 kg/m².    No intake or output data in the 24 hours ending 10/29/17 0646    Ortho/SPM Exam     AAOx4  NAD  RRR  No increased WOB  Aquacel c/d/i  SILT and motor intact T/SP/DP  WWP extremities  FCDs in place and functioning    Significant Labs: All pertinent labs within the past 24 hours have been reviewed.    Significant Imaging: I have reviewed all pertinent imaging results/findings.    Assessment/Plan:     * Closed displaced intertrochanteric fracture of left femur with routine healing s/p IM tayo on 10/26/2017    A/P: 89 y.o. female POD  3 s/p left CMN for intertrochanteric femur fracture     Continue close medical management given hypotensive episodes  PT/OT as tolerated.  Patient got out of bed and into chair yesterday please attempt this daily  CAM boot for pseudojones fracture vs no boot depending on how patient tolerates the boot  Postop abx in place  Lovenox 40mg qday for DVT     Dispo: Stable from orthopaedic management perspective. Appreciate medical management.         Closed fracture of base of fifth metatarsal bone    L pseudojones fx  - will obtain boot after surgery  - WB status based on hip postop              Loc Garcia MD  Orthopedics  [facility]  "

## 2017-10-29 NOTE — ASSESSMENT & PLAN NOTE
Encounter for aftercare for healing closed traumatic fracture of left hip  Patient is POD # 3. Patient reports no pain in left hip.   · Plan is to continue PT/OT for gait training and strengthening and restoration of ADL's.   · Patient is TTWB: left lower extremity as per Orthopedic recommendations.   · Plan is to continue Lovenox 40 mg subcutaneous daily and will need a total of 4 weeks post-op after hip fracture surgery for DVT prophylaxis.   · Orthopedics is following and managing hip fracture and surgical site. Perineural pain catheter in place for pain control and being managed by Anesthesia Pain Service.   · Skilled nursing consult placed as patient will need skilled care upon discharge from the hospital and  notified and working on choices with family and referrals sent to [facility], [facility] and [facility]. Patient awaiting SNF placement and medically ready for discharge.

## 2017-10-30 VITALS
DIASTOLIC BLOOD PRESSURE: 59 MMHG | TEMPERATURE: 97 F | HEIGHT: 59 IN | OXYGEN SATURATION: 91 % | SYSTOLIC BLOOD PRESSURE: 121 MMHG | RESPIRATION RATE: 16 BRPM | WEIGHT: 95 LBS | BODY MASS INDEX: 19.15 KG/M2 | HEART RATE: 75 BPM

## 2017-10-30 PROBLEM — E83.39 HYPOPHOSPHATEMIA: Status: RESOLVED | Noted: 2017-10-29 | Resolved: 2017-10-30

## 2017-10-30 LAB
BASOPHILS # BLD AUTO: 0.02 K/UL
BASOPHILS NFR BLD: 0.2 %
DIFFERENTIAL METHOD: ABNORMAL
EOSINOPHIL # BLD AUTO: 0.2 K/UL
EOSINOPHIL NFR BLD: 2.2 %
ERYTHROCYTE [DISTWIDTH] IN BLOOD BY AUTOMATED COUNT: 15 %
HCT VFR BLD AUTO: 25 %
HGB BLD-MCNC: 8 G/DL
IMM GRANULOCYTES # BLD AUTO: 0.05 K/UL
IMM GRANULOCYTES NFR BLD AUTO: 0.6 %
LYMPHOCYTES # BLD AUTO: 1.7 K/UL
LYMPHOCYTES NFR BLD: 19.7 %
MCH RBC QN AUTO: 30.3 PG
MCHC RBC AUTO-ENTMCNC: 32 G/DL
MCV RBC AUTO: 95 FL
MONOCYTES # BLD AUTO: 0.9 K/UL
MONOCYTES NFR BLD: 10.4 %
NEUTROPHILS # BLD AUTO: 5.7 K/UL
NEUTROPHILS NFR BLD: 66.9 %
NRBC BLD-RTO: 0 /100 WBC
PHOSPHATE SERPL-MCNC: 2.5 MG/DL
PLATELET # BLD AUTO: 214 K/UL
PMV BLD AUTO: 9.9 FL
RBC # BLD AUTO: 2.64 M/UL
WBC # BLD AUTO: 8.47 K/UL

## 2017-10-30 PROCEDURE — 25000003 PHARM REV CODE 250: Performed by: HOSPITALIST

## 2017-10-30 PROCEDURE — 84100 ASSAY OF PHOSPHORUS: CPT

## 2017-10-30 PROCEDURE — 25000242 PHARM REV CODE 250 ALT 637 W/ HCPCS: Performed by: INTERNAL MEDICINE

## 2017-10-30 PROCEDURE — 99239 HOSP IP/OBS DSCHRG MGMT >30: CPT | Mod: ,,, | Performed by: INTERNAL MEDICINE

## 2017-10-30 PROCEDURE — 85025 COMPLETE CBC W/AUTO DIFF WBC: CPT

## 2017-10-30 PROCEDURE — 25000003 PHARM REV CODE 250: Performed by: STUDENT IN AN ORGANIZED HEALTH CARE EDUCATION/TRAINING PROGRAM

## 2017-10-30 PROCEDURE — 94640 AIRWAY INHALATION TREATMENT: CPT

## 2017-10-30 PROCEDURE — 36415 COLL VENOUS BLD VENIPUNCTURE: CPT

## 2017-10-30 PROCEDURE — 27000221 HC OXYGEN, UP TO 24 HOURS

## 2017-10-30 PROCEDURE — 63600175 PHARM REV CODE 636 W HCPCS: Performed by: STUDENT IN AN ORGANIZED HEALTH CARE EDUCATION/TRAINING PROGRAM

## 2017-10-30 PROCEDURE — 99231 SBSQ HOSP IP/OBS SF/LOW 25: CPT | Mod: GC,,, | Performed by: ANESTHESIOLOGY

## 2017-10-30 PROCEDURE — S4991 NICOTINE PATCH NONLEGEND: HCPCS | Performed by: STUDENT IN AN ORGANIZED HEALTH CARE EDUCATION/TRAINING PROGRAM

## 2017-10-30 RX ORDER — IPRATROPIUM BROMIDE AND ALBUTEROL SULFATE 2.5; .5 MG/3ML; MG/3ML
3 SOLUTION RESPIRATORY (INHALATION) ONCE
Status: COMPLETED | OUTPATIENT
Start: 2017-10-30 | End: 2017-10-30

## 2017-10-30 RX ORDER — TRAMADOL HYDROCHLORIDE 50 MG/1
50 TABLET ORAL EVERY 6 HOURS PRN
Start: 2017-10-30 | End: 2017-10-30

## 2017-10-30 RX ORDER — TRAMADOL HYDROCHLORIDE 50 MG/1
50 TABLET ORAL EVERY 6 HOURS PRN
Qty: 40 TABLET | Refills: 0 | Status: SHIPPED | OUTPATIENT
Start: 2017-10-30

## 2017-10-30 RX ORDER — ACETAMINOPHEN 500 MG
1000 TABLET ORAL EVERY 6 HOURS
Refills: 0 | COMMUNITY
Start: 2017-10-30

## 2017-10-30 RX ORDER — POLYETHYLENE GLYCOL 3350 17 G/17G
17 POWDER, FOR SOLUTION ORAL DAILY
Refills: 0
Start: 2017-10-30

## 2017-10-30 RX ORDER — FERROUS SULFATE, DRIED 160(50) MG
1 TABLET, EXTENDED RELEASE ORAL 2 TIMES DAILY
Start: 2017-10-30 | End: 2018-10-30

## 2017-10-30 RX ORDER — AMOXICILLIN 250 MG
1 CAPSULE ORAL 2 TIMES DAILY
COMMUNITY
Start: 2017-10-30

## 2017-10-30 RX ORDER — ENOXAPARIN SODIUM 100 MG/ML
40 INJECTION SUBCUTANEOUS DAILY
Start: 2017-10-30 | End: 2017-11-23

## 2017-10-30 RX ADMIN — NICOTINE 1 PATCH: 14 PATCH, EXTENDED RELEASE TRANSDERMAL at 08:10

## 2017-10-30 RX ADMIN — STANDARDIZED SENNA CONCENTRATE AND DOCUSATE SODIUM 1 TABLET: 8.6; 5 TABLET, FILM COATED ORAL at 08:10

## 2017-10-30 RX ADMIN — ACETAMINOPHEN 1000 MG: 500 TABLET ORAL at 06:10

## 2017-10-30 RX ADMIN — POLYETHYLENE GLYCOL 3350 17 G: 17 POWDER, FOR SOLUTION ORAL at 08:10

## 2017-10-30 RX ADMIN — ACETAMINOPHEN 1000 MG: 500 TABLET ORAL at 11:10

## 2017-10-30 RX ADMIN — POLYMYXIN B SULFATE, TRIMETHOPRIM SULFATE 1 DROP: 10000; 1 SOLUTION/ DROPS OPHTHALMIC at 06:10

## 2017-10-30 RX ADMIN — ENOXAPARIN SODIUM 40 MG: 100 INJECTION SUBCUTANEOUS at 04:10

## 2017-10-30 RX ADMIN — OYSTER SHELL CALCIUM WITH VITAMIN D 1 TABLET: 500; 200 TABLET, FILM COATED ORAL at 08:10

## 2017-10-30 RX ADMIN — PANTOPRAZOLE SODIUM 40 MG: 40 TABLET, DELAYED RELEASE ORAL at 08:10

## 2017-10-30 RX ADMIN — IPRATROPIUM BROMIDE AND ALBUTEROL SULFATE 3 ML: .5; 3 SOLUTION RESPIRATORY (INHALATION) at 02:10

## 2017-10-30 NOTE — ANESTHESIA POST-OP PAIN MANAGEMENT
Acute Pain Service Progress Note    Simona Bassett is a 89 y.o., female, [id number].    Surgery:  IM NAILING OF FEMUR - left - synthes - profx (Left)    Post Op Day #: 3     Catheter type: perineural  SIFI    Infusion type: Ropivacaine 0.2%  10 basal    Problem List:    Active Hospital Problems    Diagnosis  POA    *Closed displaced intertrochanteric fracture of left femur with routine healing s/p IM tayo on 10/26/2017 [S72.142D]  Not Applicable    Hypophosphatemia [E83.39]  No    Encounter for aftercare for healing closed traumatic fracture of left hip [S72.002D]  Not Applicable    Acute blood loss as cause of postoperative anemia [D62]  No    Poor appetite [R63.0]  Yes    Closed fracture of base of fifth metatarsal bone [S92.353A]  Yes    Constipation due to pain medication [K59.03]  Yes    Gastroesophageal reflux disease without esophagitis [K21.9]  Yes    Mixed hyperlipidemia [E78.2]  Yes    Osteoporosis, post-menopausal [M81.0]  Yes    Tobacco abuse [Z72.0]  Yes      Resolved Hospital Problems    Diagnosis Date Resolved POA    Acute cystitis with hematuria [N30.01] 10/29/2017 Yes       Subjective:     General appearance of rouseable and responsive but sleepy   Pain with rest: 2    Faces   Pain with movement: 2    Faces   Side Effects    1. Pruritis No    2. Nausea No    3. Motor Blockade No, 0=Ability to raise lower extremities off bed    4. Sedation Yes, S=sleep, easy to arouse    Objective:        Catheter site clean, dry, intact      Vitals   Vitals:    10/30/17 0452   BP: 116/61   Pulse: 69   Resp: 17   Temp: 36 °C (96.8 °F)        Labs    Admission on 10/25/2017   No results displayed because visit has over 200 results.           Meds   Current Facility-Administered Medications   Medication Dose Route Frequency Provider Last Rate Last Dose    0.9%  NaCl infusion (for blood administration)   Intravenous Q24H PRN Elma Harris MD        acetaminophen tablet 1,000 mg  1,000 mg Oral Q6H Fletcher  Sheeba Polk MD   1,000 mg at 10/30/17 0652    bisacodyl suppository 10 mg  10 mg Rectal Daily PRN Fletcher Polk MD        calcium-vitamin D3 500 mg(1,250mg) -200 unit per tablet 1 tablet  1 tablet Oral BID Maria Del Rosario Hackett MD   1 tablet at 10/29/17 2157    dextrose 50% injection 12.5 g  12.5 g Intravenous PRN Maria Del Rosario Hackett MD        dextrose 50% injection 25 g  25 g Intravenous PRN Maria Del Rosario Hackett MD        enoxaparin injection 40 mg  40 mg Subcutaneous Daily Tristan Vaughn MD   40 mg at 10/29/17 1635    glucagon (human recombinant) injection 1 mg  1 mg Intramuscular PRN Maria Del Rosario Hackett MD        glucose chewable tablet 16 g  16 g Oral PRN Maria Del Rosario Hackett MD        glucose chewable tablet 24 g  24 g Oral PRN Maria Del Rosario Hackett MD        influenza (FLUZONE HIGH-DOSE) vaccine 0.5 mL  0.5 mL Intramuscular Prior to discharge Derek Verdin MD        methocarbamol tablet 1,000 mg  1,000 mg Oral Q6H PRN Fletcher Polk MD        nicotine 14 mg/24 hr 1 patch  1 patch Transdermal Daily Fletcher Polk MD   1 patch at 10/29/17 0900    ondansetron injection 4 mg  4 mg Intravenous Q12H PRN Fletcher Polk MD        pantoprazole EC tablet 40 mg  40 mg Oral Daily Maria Del Rosario Hackett MD   40 mg at 10/29/17 0821    polyethylene glycol packet 17 g  17 g Oral Daily Fletcher Polk MD   17 g at 10/29/17 0821    polymyxin B sulf-trimethoprim 10,000 unit- 1 mg/mL ophthalmic drops 1 drop  1 drop Left Eye TID Maria Del Rosario Hackett MD   1 drop at 10/30/17 0652    pregabalin capsule 75 mg  75 mg Oral QHS Fletcher Polk MD   75 mg at 10/29/17 2157    ramelteon tablet 8 mg  8 mg Oral Nightly PRN Fletcher Polk MD        ropivacaine (PF) 2 mg/ml (0.2%) infusion  10 mL/hr Perineural Continuous Fletcher Polk MD 10 mL/hr at 10/29/17 1841 10 mL/hr at 10/29/17 1841    senna-docusate 8.6-50 mg per tablet 1 tablet  1 tablet Oral BID Fletcher Polk MD   1 tablet at 10/29/17 2200    simvastatin  tablet 40 mg  40 mg Oral QHS Maria Del Rosario Hackett MD   40 mg at 10/29/17 2157    traMADol tablet 50 mg  50 mg Oral Q6H PRN Elma Harris MD   50 mg at 10/28/17 0913        Anticoagulant dose lovenox at 40mg.    Assessment:     Pain control adequate    Plan:    Paused PNC this morning in anticipation for DC to SNF today. Will remove PNC prior to discharge. Patient resting comfortably this morning.    Signing Physician:  Ayse Nassar MD   Anesthesiology PGY-2    I have seen the patient, reviewed the Resident's assessment and plan. I have personally interviewed and examined the patient at bedside and: agree with the findings.   Pain well controlled.  Will d/c nerve catheter.  PRN ultram for pain.  Will sign off    SHAHNAZ Walker MD  Staff Anesthesiologist  [facility] Acute Pain Service

## 2017-10-30 NOTE — ASSESSMENT & PLAN NOTE
Improved. On admission to hospital, patient was noted to have Hgb of 11.3. After surgery patient's Hgb level has dropped to 6.5 on 10/28 and expected blood loss related to surgery and hip fracture. Patient has no signs of active bleeding and hemodynamically stable.   · Patient transfused 1 unit of PRBCs on 10/28 for Hgb 6.5 and Hgb improved to 8.9 after blood transfusion with appropriate response. Hgb on discharge is 8.0 and no signs of bleeding on discharge.  · Goal to keep Hgb > 7.

## 2017-10-30 NOTE — PLAN OF CARE
[facility]     Department of Hospital Medicine     [address] [place] [address]     [phone] after hours  [phone] fax       NURSING HOME ORDERS    10/30/2017    Admit to [facility]:  Skilled Bed                                            Diagnoses:  Active Hospital Problems    Diagnosis  POA    *Closed displaced intertrochanteric fracture of left femur with routine healing s/p IM tayo on 10/26/2017 [S72.142D]  Not Applicable     Priority: 2     Acute blood loss as cause of postoperative anemia [D62]  No     Priority: 1 - High    Osteoporosis, post-menopausal [M81.0]  Yes     Priority: 3     Hypophosphatemia [E83.39]  No     Priority: 4     Mixed hyperlipidemia [E78.2]  Yes     Priority: 5     Gastroesophageal reflux disease without esophagitis [K21.9]  Yes     Priority: 6     Tobacco abuse [Z72.0]  Yes     Priority: 6     Constipation due to pain medication [K59.03]  Yes     Priority: 7     Poor appetite [R63.0]  Yes     Priority: 9     Closed fracture of base of fifth metatarsal bone [S92.353A]  Yes     Priority: 10     Encounter for aftercare for healing closed traumatic fracture of left hip [S72.002D]  Not Applicable      Resolved Hospital Problems    Diagnosis Date Resolved POA    Acute cystitis with hematuria [N30.01] 10/29/2017 Yes       Allergies:Review of patient's allergies indicates:  No Known Allergies    Vitals: Every shift (Skilled Nursing patients)        Code Status: Full Code     Diet: dental soft diet as patient has loose teeth     Supplement: Ensure or Boost nutritional supplement one can with meals (Patient prefers Vanilla)     Activities:   - Up in a chair each morning as tolerated   - Ambulate with assistance to bathroom   - May use walker, cane, or self-propelled wheelchair   - Weight bearing: FWB/WBAT: left lower extremity  Patient should wear walking boot to left foot when ambulating but does not need to  wear boot in bed just when walking.     LABS:  Per facility protocol      Nursing Precautions:            - Fall precautions per nursing home protocol    Patient is very hard of hearing and uses hearing aids.     CONSULTS:     Physical Therapy to evaluate and treat 5 times a week     Occupational Therapy to evaluate and treat 5 times a week    MISCELLANEOUS CARE:  Routine Skin Care: Apply moisture barrier cream to all skin folds and wet areas in perineal area daily and after baths and all bowel movements.    Oxygen at 2 liters continuous as needed to keep oxygen sats > 90%     WOUND CARE ORDERS  Keep surgical bandage to left hip in place until Orthopedic clinic follow-up as outpatient.    Medications:     acetaminophen (TYLENOL) 500 MG tablet  Take 2 tablets (1,000 mg total) by mouth every 6 (six) hours as needed for mild pain or temperature > 100.4 F.      aspirin 81 mg Tab  1 tablet my mouth gaily      calcium-vitamin D3 (CALCIUM 500 + D) 500 mg(1,250mg) -200 unit per tablet  Take 1 tablet by mouth 2 (two) times daily.      enoxaparin (LOVENOX) 40 mg/0.4 mL Syrg  Inject 0.4 mLs (40 mg total) into the skin once daily. DVT prophylaxis after hip fracture surgery with end date of 11/23/2017.      esomeprazole (NEXIUM) 20 MG capsule  Take 1 capsule (20 mg total) by mouth before breakfast.      nitroGLYCERIN (NITROSTAT) 0.4 MG SL tablet  Place 1 tablet (0.4 mg total) under the tongue every 5 (five) minutes as needed for Chest pain.       polyethylene glycol (GLYCOLAX) 17 gram PwPk  Take 17 g by mouth once daily.      polymyxin B sulf-trimethoprim (POLYTRIM) 10,000 unit- 1 mg/mL Drop  Place 1 drop into the left eye 3 (three) times daily.       senna-docusate 8.6-50 mg (PERICOLACE) 8.6-50 mg per tablet  Take 1 tablet by mouth 2 (two) times daily.      simvastatin (ZOCOR) 40 MG tablet  TAKE ONE TABLET BY MOUTH ONCE DAILY IN THE EVENING      traMADol (ULTRAM) 50 mg tablet  Take 1 tablet (50 mg total) by mouth every 6 (six)  hours as needed for moderate to severe pain.             Follow-up:   Future Appointments  Date Time Provider Department Center   11/30/2017 10:15 AM Brijesh Treviño MD [facility]TH[facility] [address]   11/30/2017 10:30 AM [facility] FRACTURE CARE CLINIC [facility] [address]       _________________________________  Elma Harris MD  10/30/2017

## 2017-10-30 NOTE — SUBJECTIVE & OBJECTIVE
"Principal Problem:Closed displaced intertrochanteric fracture of left femur with routine healing    Principal Orthopedic Problem: same    Interval History: Patient seen and examined at bedside.  No acute events overnight.  Pain controlled. Stood with PT yesterday.    Review of patient's allergies indicates:  No Known Allergies    Current Facility-Administered Medications   Medication    0.9%  NaCl infusion (for blood administration)    acetaminophen tablet 1,000 mg    bisacodyl suppository 10 mg    calcium-vitamin D3 500 mg(1,250mg) -200 unit per tablet 1 tablet    dextrose 50% injection 12.5 g    dextrose 50% injection 25 g    enoxaparin injection 40 mg    glucagon (human recombinant) injection 1 mg    glucose chewable tablet 16 g    glucose chewable tablet 24 g    influenza (FLUZONE HIGH-DOSE) vaccine 0.5 mL    methocarbamol tablet 1,000 mg    nicotine 14 mg/24 hr 1 patch    ondansetron injection 4 mg    pantoprazole EC tablet 40 mg    polyethylene glycol packet 17 g    polymyxin B sulf-trimethoprim 10,000 unit- 1 mg/mL ophthalmic drops 1 drop    pregabalin capsule 75 mg    ramelteon tablet 8 mg    ropivacaine (PF) 2 mg/ml (0.2%) infusion    senna-docusate 8.6-50 mg per tablet 1 tablet    simvastatin tablet 40 mg    traMADol tablet 50 mg     Objective:     Vital Signs (Most Recent):  Temp: 96.8 °F (36 °C) (10/30/17 0452)  Pulse: 69 (10/30/17 0452)  Resp: 17 (10/30/17 0452)  BP: 116/61 (10/30/17 0452)  SpO2: 100 % (10/30/17 0452) Vital Signs (24h Range):  Temp:  [95.4 °F (35.2 °C)-97.8 °F (36.6 °C)] 96.8 °F (36 °C)  Pulse:  [64-75] 69  Resp:  [16-20] 17  SpO2:  [54 %-100 %] 100 %  BP: (105-136)/(53-68) 116/61     Weight: 43.1 kg (95 lb)  Height: 4' 11" (149.9 cm)  Body mass index is 19.19 kg/m².      Intake/Output Summary (Last 24 hours) at 10/30/17 0552  Last data filed at 10/29/17 0700   Gross per 24 hour   Intake              360 ml   Output                0 ml   Net              360 ml "       Ortho/SPM Exam     AAOx4  NAD  RRR  No increased WOB  Dressings c/d/i  SILT and motor intact T/SP/DP  WWP extremities  FCDs in place and functioning    Significant Labs: All pertinent labs within the past 24 hours have been reviewed.    Significant Imaging: I have reviewed all pertinent imaging results/findings.

## 2017-10-30 NOTE — PLAN OF CARE
11:36 AM  SW received nursing home orders. Faxed orders to [facility]. Also spoke with daughter to inform her to sign paperwork today. Daughter verbalized understanding.     Xenia Moreno LMSW   [facility]  Ext [phone]

## 2017-10-30 NOTE — ASSESSMENT & PLAN NOTE
Patient with known chronic constipation. Will continue on daily Senakot and Miralax to treat on discharge.

## 2017-10-30 NOTE — ASSESSMENT & PLAN NOTE
Encounter for aftercare for healing closed traumatic fracture of left hip  Patient is POD # 4. Patient reports no pain in left hip.   · Plan is to continue PT/OT for gait training and strengthening and restoration of ADL's on discharge to [facility].   · Patient is WBAT: left lower extremity as per Orthopedic recommendations.   · Plan is to continue Lovenox 40 mg subcutaneous daily on discharge and will need a total of 4 weeks post-op after hip fracture surgery for DVT prophylaxis. End date 11/23/2017.

## 2017-10-30 NOTE — PLAN OF CARE
10:05 AM  SW called [facility] to indicate if they can accept pt today. Spoke with Maria Del Rosario who stated they can accept pt today. Waiting benefits to come back with insurance.     Xenia Moreno LMSW   [facility]  Ext [phone]

## 2017-10-30 NOTE — PLAN OF CARE
POD 4 s/p IMN of L femur fracture. SW and CM will continue to follow for any additional needs. Plan A to discharge to SNF as soon as medically stable. Plan B to discharge home with home health.     DAR working on placement. See DAR notes for details.     10/30/17 1031   Discharge Reassessment   Assessment Type Discharge Planning Reassessment   Provided patient/caregiver education on the expected discharge date and the discharge plan Yes   Do you have any problems affording any of your prescribed medications? No   Discharge Plan A Skilled Nursing Facility   Discharge Plan B Home Health;Home with family   Patient choice form signed by patient/caregiver N/A   Can the patient answer the patient profile reliably? Yes, cognitively intact   How does the patient rate their overall health at the present time? Fair   Describe the patient's ability to walk at the present time. Major restrictions/daily assistance from another person   How often would a person be available to care for the patient? Often   Number of comorbid conditions (as recorded on the chart) Five or more   During the past month, has the patient often been bothered by feeling down, depressed or hopeless? No   During the past month, has the patient often been bothered by little interest or pleasure in doing things? No

## 2017-10-30 NOTE — ASSESSMENT & PLAN NOTE
· Patient admitted with hip fracture related to osteoporosis. Patient will need outpatient DEXA scan and further evaluation for additional treatment of osteoporosis. Will refer patient to [facility] on discharge for further evaluation and management of osteoporosis. Patient has scheduled appointment in Fracture Clinic for 11/30/2017 to further evaluate osteoporosis and treatment for osteoporosis.  · Continue Calcium and Vitamin D 1 tablet po BID to treat on discharge.  · Vitamin D level is adequate at 48 on this admit so no additional Vitamin D replacement needed.

## 2017-10-30 NOTE — PROGRESS NOTES
[facility]  Orthopedics  Progress Note    Patient Name: Simona Bassett  MRN: [id number]  Admission Date: 10/25/2017  Hospital Length of Stay: 5 days  Attending Provider: Elma Harris MD  Primary Care Provider: Twila Loera MD  Follow-up For: Procedure(s) (LRB):  IM NAILING OF FEMUR - left - synthes - profx (Left)    Post-Operative Day: 4 Days Post-Op  Subjective:     Principal Problem:Closed displaced intertrochanteric fracture of left femur with routine healing    Principal Orthopedic Problem: same    Interval History: Patient seen and examined at bedside.  No acute events overnight.  Pain controlled. Stood with PT yesterday.    Review of patient's allergies indicates:  No Known Allergies    Current Facility-Administered Medications   Medication    0.9%  NaCl infusion (for blood administration)    acetaminophen tablet 1,000 mg    bisacodyl suppository 10 mg    calcium-vitamin D3 500 mg(1,250mg) -200 unit per tablet 1 tablet    dextrose 50% injection 12.5 g    dextrose 50% injection 25 g    enoxaparin injection 40 mg    glucagon (human recombinant) injection 1 mg    glucose chewable tablet 16 g    glucose chewable tablet 24 g    influenza (FLUZONE HIGH-DOSE) vaccine 0.5 mL    methocarbamol tablet 1,000 mg    nicotine 14 mg/24 hr 1 patch    ondansetron injection 4 mg    pantoprazole EC tablet 40 mg    polyethylene glycol packet 17 g    polymyxin B sulf-trimethoprim 10,000 unit- 1 mg/mL ophthalmic drops 1 drop    pregabalin capsule 75 mg    ramelteon tablet 8 mg    ropivacaine (PF) 2 mg/ml (0.2%) infusion    senna-docusate 8.6-50 mg per tablet 1 tablet    simvastatin tablet 40 mg    traMADol tablet 50 mg     Objective:     Vital Signs (Most Recent):  Temp: 96.8 °F (36 °C) (10/30/17 0452)  Pulse: 69 (10/30/17 0452)  Resp: 17 (10/30/17 0452)  BP: 116/61 (10/30/17 0452)  SpO2: 100 % (10/30/17 0452) Vital Signs (24h Range):  Temp:  [95.4 °F (35.2 °C)-97.8 °F (36.6 °C)]  "96.8 °F (36 °C)  Pulse:  [64-75] 69  Resp:  [16-20] 17  SpO2:  [54 %-100 %] 100 %  BP: (105-136)/(53-68) 116/61     Weight: 43.1 kg (95 lb)  Height: 4' 11" (149.9 cm)  Body mass index is 19.19 kg/m².      Intake/Output Summary (Last 24 hours) at 10/30/17 0552  Last data filed at 10/29/17 0700   Gross per 24 hour   Intake              360 ml   Output                0 ml   Net              360 ml       Ortho/SPM Exam     AAOx4  NAD  RRR  No increased WOB  Dressings c/d/i  SILT and motor intact T/SP/DP  WWP extremities  FCDs in place and functioning    Significant Labs: All pertinent labs within the past 24 hours have been reviewed.    Significant Imaging: I have reviewed all pertinent imaging results/findings.    Assessment/Plan:     * Closed displaced intertrochanteric fracture of left femur with routine healing s/p IM tayo on 10/26/2017    89 y.o. female POD4 s/p L CMN    Pain control  PT/OT: WBAT LLE  DVT PPx: lovenox  Abx: postop Ancef complete  Drain: none  Bean: none    Dispo: SNF when medically able        Closed fracture of base of fifth metatarsal bone    L pseudojones fx  - will obtain shoe  - WB status based on hip postop              Fletcher Polk MD  Orthopedics  [facility]  "

## 2017-10-30 NOTE — NURSING
Called report to demetrius hernandez  at [facility]. Stated understanding of pts orders and estimated  time. Will continue to monitor.

## 2017-10-30 NOTE — ASSESSMENT & PLAN NOTE
Orthopedics evaluated. No surgical intervention needed. Pain control. Patient has walking boot in place and to wear walking boot when ambulating with therapy at SNF. Patient can take boot off when not walking or when sleeping.

## 2017-10-30 NOTE — ASSESSMENT & PLAN NOTE
· Chronic issue that was present on admit as family reports poor appetite at home.   · Prealbumin 21 and adequate on admit.   · Boost 1 can with meals to help with nutrition on discharge to [facility].

## 2017-10-30 NOTE — ASSESSMENT & PLAN NOTE
89 y.o. female POD4 s/p L CMN    Pain control  PT/OT: WBAT LLE  DVT PPx: lovenox  Abx: postop Ancef complete  Drain: none  Bean: none    Dispo: SNF when medically able

## 2017-10-30 NOTE — ADDENDUM NOTE
Addendum  created 10/30/17 1139 by Marilynn Albarran RN    LDA properties accepted, Order list changed, Sign clinical note

## 2017-10-30 NOTE — NURSING
Pt in bed resting with family at bedside. Pt denies c/o pain or n/v. D/c orders and prescriptions given to pt. Pt states understanding of orders. D/c PIV and tolerated well by pt. Pt waiting for transport to vehicle via stretcher.

## 2017-10-30 NOTE — SUBJECTIVE & OBJECTIVE
Interval History: Daughter at bedside and reports patient's appetite is improving. Patient is enjoying Boost with meals. Patient denies any left hip pain and Anesthesia removed perineural pain catheter this am. Patient to be discharged to [facility] SNF today.      Review of Systems   Constitutional: Negative for fatigue and fever.   HENT: Positive for hearing loss (Patient very hard of hearing and uses hearing aids).    Respiratory: Negative for cough, chest tightness and shortness of breath.    Cardiovascular: Negative for chest pain and leg swelling.   Gastrointestinal: Negative for abdominal pain, nausea and vomiting.   Genitourinary: Negative for dysuria.   Musculoskeletal: Positive for back pain (Low back). Negative for arthralgias.   Skin: Positive for pallor. Negative for rash.   Neurological: Negative for dizziness and light-headedness.   Psychiatric/Behavioral: Negative for confusion and hallucinations.     Objective:     Vital Signs (Most Recent):  Temp: 96 °F (35.6 °C) (10/30/17 0800)  Pulse: 61 (10/30/17 0800)  Resp: 16 (10/30/17 0800)  BP: (!) 107/54 (10/30/17 0800)  SpO2: 100 % (10/30/17 0452) Vital Signs (24h Range):  Temp:  [96 °F (35.6 °C)-97.8 °F (36.6 °C)] 96 °F (35.6 °C)  Pulse:  [61-75] 61  Resp:  [16-20] 16  SpO2:  [54 %-100 %] 100 %  BP: (107-136)/(54-68) 107/54     Weight: 43.1 kg (95 lb)  Body mass index is 19.19 kg/m².    Intake/Output Summary (Last 24 hours) at 10/30/17 1207  Last data filed at 10/30/17 0700   Gross per 24 hour   Intake              360 ml   Output                0 ml   Net              360 ml      Physical Exam   Constitutional: No distress.   Thin and frail elderly female   HENT:   Mouth/Throat: Oropharynx is clear and moist.   Eyes: Conjunctivae are normal.   Cardiovascular: Normal rate, regular rhythm and normal heart sounds.  Exam reveals no gallop and no friction rub.    No murmur heard.  Pulmonary/Chest: Effort normal and breath sounds normal. She has no  wheezes.   Abdominal: Soft. Bowel sounds are normal. She exhibits no distension. There is no tenderness.   Musculoskeletal: She exhibits no edema.   Neurological: She is alert.   Oriented to self and place but not time   Skin: Skin is warm. Capillary refill takes less than 2 seconds. No erythema.   Surgical bandage in place to left hip    Psychiatric: She has a normal mood and affect. Her behavior is normal.       Significant Labs:   CBC:   Recent Labs  Lab 10/29/17  0524 10/30/17  0438   WBC 9.16 8.47   HGB 8.9* 8.0*   HCT 26.8* 25.0*   * 214     Phosphorus 2.5    Significant Imaging: I have reviewed all pertinent imaging results/findings within the past 24 hours.

## 2017-10-30 NOTE — ADDENDUM NOTE
Addendum  created 10/30/17 1157 by Giancarlo Walker MD    Anesthesia Event edited, Charge Capture section accepted, Sign clinical note

## 2017-10-30 NOTE — PLAN OF CARE
POD 4 s/p IMN of L femur fracture. PT/OT recommended SNF placement. Plans to discharge patient to [facility] today. SW informed patient's family; see notes for details. SW and CM will continue to follow for any additional needs. Discharge and follow-up instructions to be completed by the bedside nurse.    Future Appointments  Date Time Provider Department Center   11/30/2017 10:15 AM Brijesh Treviño MD [facility] ORTHO [place]   11/30/2017 10:30 AM [facility] FRACTURE CARE CLINIC [facility] FRA CAR [place]      10/30/17 2544   Final Note   Assessment Type Final Discharge Note   Discharge Disposition SNF  ([facility] SNF)   What phone number can be called within the next 1-3 days to see how you are doing after discharge? ([phone])   Hospital Follow Up  Appt(s) scheduled? Yes   Discharge plans and expectations educations in teach back method with documentation complete? Yes

## 2017-10-30 NOTE — ASSESSMENT & PLAN NOTE
· Patient smokes 1 PPD x 60 years of cigarettes. Patient has been counseled on smoking cessation.

## 2017-10-30 NOTE — ASSESSMENT & PLAN NOTE
· Controlled. Patient on Protonix 40 mg po daily to treat in the hospital. On discharge, patient to resume her outpatient medication with Nexium 20 mg po daily to treat on discharge to [facility].

## 2017-10-30 NOTE — PLAN OF CARE
3:11 PM  SW received notification from Maria Del Rosario with [facility] that pt will be going to room 802B. Nurse to call report to   [phone] to [place] [address] Nurse for 802B.     Arranged transportation with Kathleen. Kathleen will arrive at 4:15PM. Informed RNChristiano.     Xenia Moreno, DAR   [facility]  Ext [phone]

## 2017-10-31 ENCOUNTER — TELEPHONE (OUTPATIENT)
Dept: ORTHOPEDICS | Facility: CLINIC | Age: 82
End: 2017-10-31

## 2017-10-31 ENCOUNTER — PATIENT OUTREACH (OUTPATIENT)
Dept: ADMINISTRATIVE | Facility: CLINIC | Age: 82
End: 2017-10-31

## 2017-10-31 NOTE — ASSESSMENT & PLAN NOTE
· Chronic issue that was present on admit as family reports poor appetite at home.   · Prealbumin 21 and adequate on admit.   · Boost 1 can with meals to help with nutrition on discharge to [facility].

## 2017-10-31 NOTE — ASSESSMENT & PLAN NOTE
· Controlled. Patient on Protonix 40 mg po daily to treat in the hospital. On discharge, patient to resume her outpatient medication with Nexium 20 mg po daily to treat on discharge to [facility].

## 2017-10-31 NOTE — TELEPHONE ENCOUNTER
----- Message from Mac Chilel PA-C sent at 10/31/2017 12:44 PM CDT -----  Contact: Eula / [facility]  Please move appt to 1:00 or 1:30 to be just before Dr. Treviño  ----- Message -----  From: Madiha Catsillo MA  Sent: 10/31/2017  11:30 AM  To: Mac Mccormack    Pt is in Skilled unit area and there is no transportation in the morning but wanting to know if her appt can be in the evening and can bring her before her appt with Dr. Purcell. Eula can be reached at [phone].

## 2017-10-31 NOTE — ASSESSMENT & PLAN NOTE
Encounter for aftercare for healing closed traumatic fracture of left hip  Patient is POD # 4. Patient reports no pain in left hip.   · Plan is to continue PT/OT for gait training and strengthening and restoration of ADL's on discharge to [facility].   · Patient is WBAT: left lower extremity as per Orthopedic recommendations.   · Plan is to continue Lovenox 40 mg subcutaneous daily on discharge and will need a total of 4 weeks post-op after hip fracture surgery for DVT prophylaxis. End date 11/23/2017.

## 2017-10-31 NOTE — DISCHARGE SUMMARY
[facility] Hospital Medicine  Discharge Summary      Patient Name: Simona Bassett  MRN: [id number]  Admission Date: 10/25/2017  Hospital Length of Stay: 5 days  Discharge Date and Time: 10/30/2017  5:38 PM  Attending Physician: Elma Harris MD   Discharging Provider: Elma Harris MD  Primary Care Provider: Twila Loera MD  [facility] Medicine Team: [facility] MED  Elma Harris MD    HPI:   Ms. Simona Bassett is a 89 y/o female with osteoporosis, GERD, HLP and recent left ankle fracture who presents to the ED after she had a fall in her house today. The history is obtained by the daughter as the patient is hard of hearing. The daughter mentions that she was in her bedroom with her hands off of the walker trying to get her diapers, when she lost her balance and fell on her left hip and left elbow. After the fall, she was unable to ambulate. She normally ambulates around the house using a walker.  The patient claims she didn't hit her head when she fell. She does take Aspirin 81mg daily, last dose was on 10/24. She denies any numbness or tingling in her left leg. X-rays done in ER revealed nondisplaced fracture base of fifth metatarsal on left foot and comminuted impacted intertrochanteric fracture of the left femur.    10/26/2017   Procedure(s) (LRB):  IM NAILING OF FEMUR - left - synthes - profx (Left)    Surgeon(s):  MD Tristan Antoine MD       Hospital Course:   In the ED x-rays showed a comminuted impacted intertrochanteric fracture of the left femur and non-displaced fifth metatarsal fracture to left foot. Patient admitted to hospital with Orthopedic surgery consult. Patient was seen and evaluated by Orthopedic surgery who recommended operative repair of hip fracture but patient does not require any operative repair of fifth metatarsal fracture. Patient was medically optimized prior to surgery and taken to OR after optimization on 10/26/2017. Patient  underwent left hip IM nail for intertrochanteric fracture. Post-op patient is TTWB to the left lower extremity as per Orthopedics recommendation. Patient was placed on Lovenox 40 mg subcutaneous daily and LULÚ/SCD's for DVT prophylaxis post-op. Perineural pain catheter placed by Anesthesia Pain Service with continuous infusion of Ropivacaine to help with pain control. PT/OT consulted and evaluating patient and recommending SNF. SNF choices obtained from family and case management and  working on placement. 3 choices given by family for [facility], [facility] or [facility] and all 3 referrals placed. Pain well controlled to left hip. Patient was noted to have drop in Hgb to 6.5 on 10/28 and transfused 1 unit of PRBCs. Blood loss expected after surgery and patient hemodynamically stable with no signs of active bleeding post-op. Patient with good improvement in Hgb after blood transfusion with appropriate response with Hgb 8.9 on 10/29. Patient with no signs of bleeding on discharge and Perineural catheter removed by Anesthesia and pain well controlled with Tylenol and Tramadol as needed that patient to be discharged to SNF. Patient being discharged to [facility] on 10/30. Patient with walking boot for left 5th metatarsal fracture and patient to wear when ambulating with therapy at SNF. Patient to continue Lovenox 40 mg subcutaneous daily for DVT prophylaxis on discharge for hip fracture with end date of 11/23/2017. Patient to follow-up [place] orthopedic Clinic on 11/30/2017 with Dr. Brijesh Treviño and surgical bandage to remain in place until Orthopedic clinic follow-up.      Consults:   Consults         Status Ordering Provider     Inpatient consult to Orthopedics  Once     Provider:  (Not yet assigned)    Completed ERASTO LIEBERMAN          * Closed displaced intertrochanteric fracture of left femur with routine healing s/p IM nail on 10/26/2017     Encounter for aftercare for healing closed traumatic fracture of left hip  Patient is POD # 4. Patient reports no pain in left hip.   · Plan is to continue PT/OT for gait training and strengthening and restoration of ADL's on discharge to [facility].   · Patient is WBAT: left lower extremity as per Orthopedic recommendations.   · Plan is to continue Lovenox 40 mg subcutaneous daily on discharge and will need a total of 4 weeks post-op after hip fracture surgery for DVT prophylaxis. End date 11/23/2017.           Acute blood loss as cause of postoperative anemia    Improved. On admission to hospital, patient was noted to have Hgb of 11.3. After surgery patient's Hgb level has dropped to 6.5 on 10/28 and expected blood loss related to surgery and hip fracture. Patient has no signs of active bleeding and hemodynamically stable.   · Patient transfused 1 unit of PRBCs on 10/28 for Hgb 6.5 and Hgb improved to 8.9 after blood transfusion with appropriate response. Hgb on discharge is 8.0 and no signs of bleeding on discharge.  · Goal to keep Hgb > 7.           Osteoporosis, post-menopausal    · Patient admitted with hip fracture related to osteoporosis. Patient will need outpatient DEXA scan and further evaluation for additional treatment of osteoporosis. Will refer patient to [facility] on discharge for further evaluation and management of osteoporosis. Patient has scheduled appointment in Fracture Clinic for 11/30/2017 to further evaluate osteoporosis and treatment for osteoporosis.  · Continue Calcium and Vitamin D 1 tablet po BID to treat on discharge.  · Vitamin D level is adequate at 48 on this admit so no additional Vitamin D replacement needed.           Hypophosphatemia-resolved as of 10/30/2017    Resolved after treatment with Potassium Phosphate 30 mmol IV on 10/29.           Mixed hyperlipidemia    · Controlled.   · Continue Simvastatin to treat as patient taking at home.            Gastroesophageal reflux disease without esophagitis    · Controlled. Patient on Protonix 40 mg po daily to treat in the hospital. On discharge, patient to resume her outpatient medication with Nexium 20 mg po daily to treat on discharge to [facility].           Tobacco abuse    · Patient smokes 1 PPD x 60 years of cigarettes. Patient has been counseled on smoking cessation.           Constipation due to pain medication    Patient with known chronic constipation. Will continue on daily Senakot and Miralax to treat on discharge.          Poor appetite    · Chronic issue that was present on admit as family reports poor appetite at home.   · Prealbumin 21 and adequate on admit.   · Boost 1 can with meals to help with nutrition on discharge to [facility].         Closed fracture of base of fifth metatarsal bone    Orthopedics evaluated. No surgical intervention needed. Pain control. Patient has walking boot in place and to wear walking boot when ambulating with therapy at [facility]. Patient can take boot off when not walking or when sleeping.             Final Active Diagnoses:    Diagnosis Date Noted POA    PRINCIPAL PROBLEM:  Closed displaced intertrochanteric fracture of left femur with routine healing s/p IM tayo on 10/26/2017 [S72.142D] 10/25/2017 Not Applicable    Acute blood loss as cause of postoperative anemia [D62] 10/27/2017 No    Osteoporosis, post-menopausal [M81.0] 10/23/2012 Yes    Mixed hyperlipidemia [E78.2] 10/23/2012 Yes    Gastroesophageal reflux disease without esophagitis [K21.9] 10/02/2014 Yes    Tobacco abuse [Z72.0] 10/23/2012 Yes    Constipation due to pain medication [K59.03] 05/07/2015 Yes    Poor appetite [R63.0] 10/25/2017 Yes    Closed fracture of base of fifth metatarsal bone [S92.353A] 10/25/2017 Yes    Encounter for aftercare for healing closed traumatic fracture of left hip [S72.002D] 10/27/2017 Not Applicable      Problems Resolved During this Admission:    Diagnosis Date  Noted Date Resolved POA    Hypophosphatemia [E83.39] 10/29/2017 10/30/2017 No    Acute cystitis with hematuria [N30.01] 10/26/2017 10/29/2017 Yes       Discharged Condition: good    Disposition: Skilled Nursing Facility ([facility])    Follow Up:  Future Appointments  Date Time Provider Department Center   11/30/2017 10:15 AM Brijesh Treviño MD [facility] ORTHO [facility]   11/30/2017 10:30 AM [facility] FRACTURE CARE CLINIC [facility] FRA CAR [facility]         Patient Instructions:     Ambulatory referral to Orthopedics   Referral Priority: Routine Referral Type: Consultation   Requested Specialty: Orthopedic Surgery    Number of Visits Requested: 1      Diet general   Order Specific Question Answer Comments   Additional restrictions: Dental Soft      Activity as tolerated     Call MD for:  temperature >100.4     Call MD for:  increased confusion or weakness     Call MD for:  persistent dizziness, light-headedness, or visual disturbances     Call MD for:  worsening rash     Call MD for:  severe persistent headache     Call MD for:  difficulty breathing or increased cough     Call MD for:  redness, tenderness, or signs of infection (pain, swelling, redness, odor or green/yellow discharge around incision site)     Call MD for:  severe uncontrolled pain     Call MD for:  persistent nausea and vomiting or diarrhea         Significant Diagnostic Studies: Labs:   CMP   Recent Labs  Lab 10/29/17  0524      K 4.0      CO2 27   GLU 72   BUN 25*   CREATININE 0.8   CALCIUM 8.5*   PROT 5.1*   ALBUMIN 2.1*   BILITOT 0.5   ALKPHOS 91   AST 34   ALT <5*   ANIONGAP 7*   ESTGFRAFRICA >60.0   EGFRNONAA >60.0    and CBC   Recent Labs  Lab 10/29/17  0524 10/30/17  0438   WBC 9.16 8.47   HGB 8.9* 8.0*   HCT 26.8* 25.0*   * 214     10/25/17  X-Ray Femur Ap/Lat Left   Comminuted impacted intertrochanteric fracture of the left femur.       Medications:  Reconciled Home Medications:   Discharge Medication List as of  10/30/2017  5:38 PM      START taking these medications    Details   acetaminophen (TYLENOL) 500 MG tablet Take 2 tablets (1,000 mg total) by mouth every 6 (six) hours., Starting Mon 10/30/2017, OTC      enoxaparin (LOVENOX) 40 mg/0.4 mL Syrg Inject 0.4 mLs (40 mg total) into the skin once daily. DVT prophylaxis after hip fracture surgery with end date of 11/23/2017., Starting Mon 10/30/2017, Until Thu 11/23/2017, No Print      polyethylene glycol (GLYCOLAX) 17 gram PwPk Take 17 g by mouth once daily., Starting Mon 10/30/2017, No Print      senna-docusate 8.6-50 mg (PERICOLACE) 8.6-50 mg per tablet Take 1 tablet by mouth 2 (two) times daily., Starting Mon 10/30/2017, OTC         CONTINUE these medications which have CHANGED    Details   calcium-vitamin D3 (CALCIUM 500 + D) 500 mg(1,250mg) -200 unit per tablet Take 1 tablet by mouth 2 (two) times daily., Starting Mon 10/30/2017, Until Tue 10/30/2018, No Print      traMADol (ULTRAM) 50 mg tablet Take 1 tablet (50 mg total) by mouth every 6 (six) hours as needed for Pain., Starting Mon 10/30/2017, Print         CONTINUE these medications which have NOT CHANGED    Details   aspirin 81 mg Tab Every day, Starting 9/22/2011, Until Discontinued, Historical Med      esomeprazole (NEXIUM) 20 MG capsule Take 1 capsule (20 mg total) by mouth before breakfast., Starting 4/27/2016, Until Discontinued, Normal      nitroGLYCERIN (NITROSTAT) 0.4 MG SL tablet Place 1 tablet (0.4 mg total) under the tongue every 5 (five) minutes as needed for Chest pain. Every day PRN, Starting 11/2/2016, Until Discontinued, Normal      polymyxin B sulf-trimethoprim (POLYTRIM) 10,000 unit- 1 mg/mL Drop Place 1 drop into the left eye 3 (three) times daily. , Starting 10/4/2016, Until Discontinued, Historical Med      simvastatin (ZOCOR) 40 MG tablet TAKE ONE TABLET BY MOUTH ONCE DAILY IN THE EVENING, Normal         STOP taking these medications       lactulose (CHRONULAC) 10 gram/15 mL solution  Comments:   Reason for Stopping:               Indwelling Lines/Drains at time of discharge:   Lines/Drains/Airways          No matching active lines, drains, or airways          Time spent on the discharge of patient: 34 minutes  Patient was seen and examined on the date of discharge and determined to be suitable for discharge.         Elma Harris MD  Department of Hospital Medicine  [facility]

## 2017-11-01 ENCOUNTER — TELEPHONE (OUTPATIENT)
Dept: ORTHOPEDICS | Facility: CLINIC | Age: 82
End: 2017-11-01

## 2017-11-01 NOTE — TELEPHONE ENCOUNTER
----- Message from Mehreen Styles MA sent at 11/1/2017 11:44 AM CDT -----  Contact: Eula/[facility]       ----- Message -----  From: Valerie Denis  Sent: 11/1/2017   9:09 AM  To: Juan Garrison Staff    Eula would like to speak with you regarding rescheduling the pts 11/9 post op appt time. Eula can be reached at [phone].

## 2017-11-01 NOTE — TELEPHONE ENCOUNTER
----- Message from Valerie Denis sent at 11/1/2017  9:09 AM CDT -----  Contact: Eula/[facility]   Eula would like to speak with you regarding rescheduling the pts 11/9 post op appt time. Eula can be reached at [phone].

## 2017-11-02 ENCOUNTER — TELEPHONE (OUTPATIENT)
Dept: ORTHOPEDICS | Facility: CLINIC | Age: 82
End: 2017-11-02

## 2017-11-02 NOTE — ADDENDUM NOTE
Addendum  created 11/02/17 1056 by Loc Velázquez Jr., MD    Anesthesia Intra Blocks edited, Sign clinical note

## 2017-11-02 NOTE — TELEPHONE ENCOUNTER
Spoke with Eula.  Pt will come 11/9 at 3 pm.  Stressed to Eula that pt is on the schedule for 330 pm but pt must be at  for check in no later than 3 pm.  Eula verbalized understanding.

## 2017-11-02 NOTE — PHYSICIAN QUERY
"PT Name: Simona Bassett  MR #: [id number]    Physician Query Form - Musculoskeletal Clarification       CDS/: Mary Jane Palencia               Contact information: [email or web address]    This form is a permanent document in the medical record.     Query Date: November 2, 2017    By submitting this query, we are merely seeking further clarification of documentation. Please utilize your independent clinical judgment when addressing the question(s) below.    The Medical record contains the following:     Indicators   Supporting Clinical Findings   Location in Medical Record   x "Fracture" documented Closed displaced intertrochanteric fracture of left femur with routine healing s/p IM nail on 10/26/2017     Encounter for aftercare for healing closed traumatic fracture of left hip    Admitted with hip fracture related to osteoporosis Discharge Summary 10/30   x Chronic conditions (Osteoporosis, Malignancy, etc.) Osteoporosis, post-menopausal  Discharge Summary 10/30    Xray findings      Medication:     x Treatment: Outpatient DEXA scan and further evaluation for additional treatment of osteoporosis.  Continue Calcium and Vitamin D 1 tablet po BID to treat on discharge.  Discharge Summary 10/30    Other:        Provider, please specify the diagnosis or diagnoses associated with above clinical findings.    Traumatic Fracture (Specify)  [  ] Fracture due to a trauma  [  ] Pathologic, trauma    Non-Traumatic Fracture (Specify)  [x  ] Osteoporotic  [  ] Pathological fracture    [  ] Other Musculoskeletal Diagnosis (please specify) _______________________________  [  ] Clinically Undetermined                                                                                                                                  "

## 2017-11-02 NOTE — PT/OT/SLP DISCHARGE
Occupational Therapy Discharge Summary    Simona Bassett  MRN: [id number]   Closed displaced intertrochanteric fracture of left femur with routine healing   Patient Discharged from acute Occupational Therapy on 10/30/2017 .  Please refer to prior OT note dated on 10/29/2017 for functional status.     Assessment:   Patient was discharge unexpectedly.  Information required to complete and accurate discharge summary is unknown.  Refer to therapy initial evaluation and last progress note for initial and most recent functional status and goal achievement.  Recommendations made may be found in medical record.  GOALS:    Occupational Therapy Goals        Problem: Occupational Therapy Goal    Goal Priority Disciplines Outcome Interventions   Occupational Therapy Goal     OT, PT/OT Ongoing (interventions implemented as appropriate)    Description:  Goals to be met by: 10 days     Patient will increase functional independence with ADLs by performing:    UE Dressing with Stand-by Assistance.  LE Dressing with Moderate Assistance and Assistive Devices as needed.  Grooming while seated with Set-up Assistance.  Toileting from bedside commode with Moderate Assistance for hygiene and clothing management.   Stand pivot transfers with Minimal Assistance.  Toilet transfer to bedside commode with Minimal Assistance.                    Reasons for Discontinuation of Therapy Services  Transfer to alternate level of care.      Plan:  Patient Discharged to: Skilled Nursing Facility.         Magalie Carrizales, OT  11/2/2017

## 2017-11-02 NOTE — TELEPHONE ENCOUNTER
----- Message from Meli Martinez PA-C sent at 11/2/2017  1:22 PM CDT -----  Contact: Eula/[facility]      ----- Message -----  From: Mehreen Styles MA  Sent: 11/2/2017  12:16 PM  To: Meli Martinez PA-C        ----- Message -----  From: Valerie Denis  Sent: 11/2/2017  11:25 AM  To: Juan Garrison Staff    Eula was returning your call. Eula would like to reschedule the pts 11/09 appt time to 3:00 if possible. Eula can be reached at [phone].

## 2017-11-07 NOTE — ADDENDUM NOTE
Addendum  created 11/07/17 1305 by Sebas Olsen MD    Anesthesia Intra Blocks edited, Sign clinical note

## 2017-11-09 ENCOUNTER — HOSPITAL ENCOUNTER (OUTPATIENT)
Dept: RADIOLOGY | Facility: HOSPITAL | Age: 82
Discharge: HOME | End: 2017-11-09
Attending: PHYSICIAN ASSISTANT
Payer: MEDICARE

## 2017-11-09 ENCOUNTER — OFFICE VISIT (OUTPATIENT)
Dept: ORTHOPEDICS | Facility: CLINIC | Age: 82
End: 2017-11-09
Payer: MEDICARE

## 2017-11-09 DIAGNOSIS — Z09 S/P ORTHOPEDIC SURGERY, FOLLOW-UP EXAM: Primary | ICD-10-CM

## 2017-11-09 DIAGNOSIS — S72.142D CLOSED DISPLACED INTERTROCHANTERIC FRACTURE OF LEFT FEMUR WITH ROUTINE HEALING: ICD-10-CM

## 2017-11-09 DIAGNOSIS — M89.8X5 PAIN OF LEFT FEMUR: ICD-10-CM

## 2017-11-09 PROCEDURE — 99999 PR PBB SHADOW E&M-EST. PATIENT-LVL III: CPT | Mod: PBBFAC,,, | Performed by: PHYSICIAN ASSISTANT

## 2017-11-09 PROCEDURE — 99024 POSTOP FOLLOW-UP VISIT: CPT | Mod: ,,, | Performed by: PHYSICIAN ASSISTANT

## 2017-11-09 PROCEDURE — 99213 OFFICE O/P EST LOW 20 MIN: CPT | Mod: PBBFAC,25 | Performed by: PHYSICIAN ASSISTANT

## 2017-11-09 PROCEDURE — 73552 X-RAY EXAM OF FEMUR 2/>: CPT | Mod: TC,LT

## 2017-11-09 PROCEDURE — 73552 X-RAY EXAM OF FEMUR 2/>: CPT | Mod: 26,LT,, | Performed by: RADIOLOGY

## 2017-11-10 NOTE — PROGRESS NOTES
Ms. Bassett is an 89 y.o. female who had a low energy fall sustaining a  Left intertrochanteric femur fracture. Treated with IM nail on 10/26/2017    She is here today for a post-operative visit after a   Intramedullary nailing,  Left intertrochanteric femur fracture.  by Dr. Quinonez on 10/26/2017.    she reports that she is doing ok.  Pain is controlled.  she is taking pain medication.    She is at [facility]. She is recieving Pt there.   she denies fever, chills, and sweats since the time of the surgery.     Physical exam:  Post op dressing taken down.  Incision is clean, dry and intact.  Sutures removed without difficulty.      RADS: hardware in good position no failure noted    Assessment:  Post-op visit ( 2 weeks)    Plan:  - The patient was advised to keep the incision clean and dry for the next 24 hours after which he may wash the area with antibacterial soap in the shower. Will not submerge until the incision is completely healed  -PT   range of motion as tolerated    WBAT  - pain medication: no refill needed,   - Patient is to return to clinic in 4 weeks at time x-ray of her femur as well as her  Left foot is needed.

## 2017-11-15 ENCOUNTER — TELEPHONE (OUTPATIENT)
Dept: INTERNAL MEDICINE | Facility: CLINIC | Age: 82
End: 2017-11-15

## 2017-11-15 NOTE — TELEPHONE ENCOUNTER
----- Message from Dustin Arceo sent at 11/15/2017 11:13 AM CST -----  Contact: Brandi [phone]  Patient daughter would like to know be seen on 12/08 for skull nursing follow up . Please call and advise, Thanks

## 2017-11-27 ENCOUNTER — PATIENT OUTREACH (OUTPATIENT)
Dept: ADMINISTRATIVE | Facility: CLINIC | Age: 82
End: 2017-11-27

## 2017-11-27 RX ORDER — ASCORBIC ACID 250 MG
250 TABLET ORAL EVERY MORNING
COMMUNITY

## 2017-11-27 RX ORDER — MULTIVITAMIN
1 TABLET ORAL EVERY MORNING
COMMUNITY

## 2017-11-29 ENCOUNTER — TELEPHONE (OUTPATIENT)
Dept: ORTHOPEDICS | Facility: CLINIC | Age: 82
End: 2017-11-29

## 2017-11-29 NOTE — TELEPHONE ENCOUNTER
----- Message from Kimmie Bell MA sent at 11/28/2017  3:31 PM CST -----  Contact: DAUGHTER/RADHA      ----- Message -----  From: Jen Artis  Sent: 11/28/2017   3:19 PM  To: Juan Garrison Staff    Pt daughter called is requesting a call back in regards to the patients upcoming appts and she would like clarification on why pt is schedule on at the [facility] clinic. Pt daughter can be reached at [phone].    -LB

## 2017-11-29 NOTE — TELEPHONE ENCOUNTER
I explained to patient daughter reason for this appointment, she understood and I rescheduled her appointment on same day she comes to see Meli, she understood.

## 2017-11-30 ENCOUNTER — OFFICE VISIT (OUTPATIENT)
Dept: PODIATRY | Facility: CLINIC | Age: 82
End: 2017-11-30
Payer: MEDICARE

## 2017-11-30 VITALS
WEIGHT: 95 LBS | HEIGHT: 59 IN | HEART RATE: 72 BPM | SYSTOLIC BLOOD PRESSURE: 115 MMHG | BODY MASS INDEX: 19.15 KG/M2 | DIASTOLIC BLOOD PRESSURE: 70 MMHG

## 2017-11-30 DIAGNOSIS — R23.4 ESCHAR OF HEEL: Primary | ICD-10-CM

## 2017-11-30 DIAGNOSIS — L97.521 SKIN ULCER OF LEFT FOOT, LIMITED TO BREAKDOWN OF SKIN: ICD-10-CM

## 2017-11-30 PROCEDURE — 99213 OFFICE O/P EST LOW 20 MIN: CPT | Mod: PBBFAC | Performed by: PODIATRIST

## 2017-11-30 PROCEDURE — 99203 OFFICE O/P NEW LOW 30 MIN: CPT | Mod: S$PBB,,, | Performed by: PODIATRIST

## 2017-11-30 PROCEDURE — 99999 PR PBB SHADOW E&M-EST. PATIENT-LVL III: CPT | Mod: PBBFAC,,, | Performed by: PODIATRIST

## 2017-12-01 ENCOUNTER — TELEPHONE (OUTPATIENT)
Dept: PODIATRY | Facility: CLINIC | Age: 82
End: 2017-12-01

## 2017-12-01 DIAGNOSIS — R23.4 ESCHAR OF HEEL: Primary | ICD-10-CM

## 2017-12-01 NOTE — PROGRESS NOTES
"Subjective:      Patient ID: Simona Bassett is a 89 y.o. female.    Chief Complaint: Wound Care (lt foot) and Heel Pain    Simona is a 89 y.o. female who presents to the clinic for evaluation and treatment of high risk feet. Simona has a past medical history of Cataract; Closed displaced intertrochanteric fracture of left femur with routine healing s/p IM tayo on 10/26/2017 (10/25/2017); Closed fracture of base of fifth metatarsal bone (10/25/2017); Constipation due to pain medication (5/7/2015); Dysphagia, unspecified(787.20); Gastroesophageal reflux disease without esophagitis (10/2/2014); Glaucoma; Hearing loss of both ears; MGUS (monoclonal gammopathy of unknown significance); Mixed hyperlipidemia (10/23/2012); Osteoporosis, post-menopausal (10/23/2012); and Tobacco abuse (10/23/2012). The patient's chief complaint is foot ulcer, L heel. Daughter states this began while recuperating at [facility] after leg fx . Daughter applies betadine q day .    PCP: Twila Loera MD    Date Last Seen by PCP:   Chief Complaint   Patient presents with    Wound Care     lt foot    Heel Pain       No results found for: HGBA1C      Review of Systems   Constitution: Negative for chills, decreased appetite and fever.   Cardiovascular: Negative for leg swelling.   Musculoskeletal: Positive for arthritis and muscle weakness. Negative for joint pain, joint swelling and myalgias.   Gastrointestinal: Negative for nausea and vomiting.   Neurological: Negative for loss of balance, numbness and paresthesias.           Objective:       Vitals:    11/30/17 1311   BP: 115/70   Pulse: 72   Weight: 43.1 kg (95 lb)   Height: 4' 11" (1.499 m)   PainSc: 0-No pain        Physical Exam   Constitutional: She is oriented to person, place, and time. She appears well-developed and well-nourished.   Cardiovascular:   Dorsalis pedis and posterior tibial pulses are diminished Bilaterally. Toes are cool to touch. Feet are warm proximally.There is " decreased digital hair. Skin is atrophic, slightly hyperpigmented, and mildly edematous     Musculoskeletal: She exhibits no edema or tenderness.        Right ankle: Normal.        Left ankle: Normal.        Right foot: There is no swelling, no crepitus and no deformity.        Left foot: There is no swelling, no crepitus and no deformity.   Adequate joint range of motion without pain, limitation, nor crepitation Bilateral feet and ankle joints. Muscle strength is 5/5 in all groups bilaterally.         Lymphadenopathy:   No palpable lymph nodes   Neurological: She is alert and oriented to person, place, and time.   Skin: Skin is warm, dry and intact. No rash noted. No erythema. Nails show no clubbing.   L heel eschar firmly adhered w/o surrounding drainage, or deep probe  Dorsal L foot superficial excoriation No surrounding erythema, edema, malodor, nor drainage noted      Psychiatric: She has a normal mood and affect. Her behavior is normal.             Assessment:       Encounter Diagnoses   Name Primary?    Eschar of heel - Left Foot Yes    Skin ulcer of left foot, limited to breakdown of skin          Plan:       Simona was seen today for wound care and heel pain.    Diagnoses and all orders for this visit:    Eschar of heel - Left Foot    Skin ulcer of left foot, limited to breakdown of skin      I counseled the patient on her conditions, their implications and medical management.      Stable non infected clinically , L heel ulcer   Will continue betadine to heel q day   Daughter performs dressings  Discussed  pressure precautions in detail . Has offloading boots and uses pillows at night   Has [facility] visits twice per week.   Debridement:loose skin trimmed to wound perimeter with forceps and sterile scissors   Dressings:betadine   Offloading:foam     Follow-up:Patient is to return to the clinic in one week for follow-up but should call [facility] immediately if any signs of infection, such as fever, chills, sweats,  increased redness or pain.    Short-term goals include maintaining good offloading and minimizing bioburden, promoting granulation and epithelialization to healing.  Long-term goals include keeping the wound healed by good offloading and medical management under the direction of internist.      I discussed with the  patient  signs and symptoms of infection including redness, drainage, purulence, odor, pain, elevated BS, streaking, fever, chills, etc . Patient is   to seek medical attention (ER or urgent care) if these symptoms occur

## 2017-12-01 NOTE — TELEPHONE ENCOUNTER
Faxed over progress note from podiatry visit  Nurse would like new  Wound care orders faxed to [facility] [phone]

## 2017-12-01 NOTE — TELEPHONE ENCOUNTER
----- Message from Lauren Levine sent at 12/1/2017  3:13 PM CST -----  Contact: [email or web address], [phone]  Joslyn called in requesting the progress notes from yesterday's visit and please include any updated orders. Please fax to [phone]    Thank you

## 2017-12-03 RX ORDER — SIMVASTATIN 40 MG/1
TABLET, FILM COATED ORAL
Qty: 90 TABLET | Refills: 3 | Status: SHIPPED | OUTPATIENT
Start: 2017-12-03

## 2017-12-04 ENCOUNTER — TELEPHONE (OUTPATIENT)
Dept: PODIATRY | Facility: CLINIC | Age: 82
End: 2017-12-04

## 2017-12-07 DIAGNOSIS — M79.672 LEFT FOOT PAIN: Primary | ICD-10-CM

## 2017-12-08 ENCOUNTER — OFFICE VISIT (OUTPATIENT)
Dept: ORTHOPEDICS | Facility: CLINIC | Age: 82
End: 2017-12-08
Payer: MEDICARE

## 2017-12-08 ENCOUNTER — HOSPITAL ENCOUNTER (OUTPATIENT)
Dept: RADIOLOGY | Facility: HOSPITAL | Age: 82
Discharge: HOME | End: 2017-12-08
Attending: PHYSICIAN ASSISTANT
Payer: MEDICARE

## 2017-12-08 ENCOUNTER — DOCUMENTATION ONLY (OUTPATIENT)
Dept: ORTHOPEDICS | Facility: CLINIC | Age: 82
End: 2017-12-08

## 2017-12-08 DIAGNOSIS — S72.002D ENCOUNTER FOR AFTERCARE FOR HEALING CLOSED TRAUMATIC FRACTURE OF LEFT HIP: Primary | ICD-10-CM

## 2017-12-08 DIAGNOSIS — M79.672 LEFT FOOT PAIN: ICD-10-CM

## 2017-12-08 DIAGNOSIS — Z09 S/P ORTHOPEDIC SURGERY, FOLLOW-UP EXAM: ICD-10-CM

## 2017-12-08 DIAGNOSIS — M81.0 OSTEOPOROSIS, UNSPECIFIED OSTEOPOROSIS TYPE, UNSPECIFIED PATHOLOGICAL FRACTURE PRESENCE: Primary | ICD-10-CM

## 2017-12-08 PROCEDURE — 73630 X-RAY EXAM OF FOOT: CPT | Mod: 26,LT,, | Performed by: INTERNAL MEDICINE

## 2017-12-08 PROCEDURE — 99499 UNLISTED E&M SERVICE: CPT | Mod: S$PBB,,, | Performed by: PHYSICIAN ASSISTANT

## 2017-12-08 PROCEDURE — 99999 PR PBB SHADOW E&M-EST. PATIENT-LVL III: CPT | Mod: PBBFAC,,, | Performed by: PHYSICIAN ASSISTANT

## 2017-12-08 PROCEDURE — 99213 OFFICE O/P EST LOW 20 MIN: CPT | Mod: PBBFAC,25,27

## 2017-12-08 PROCEDURE — 73552 X-RAY EXAM OF FEMUR 2/>: CPT | Mod: TC,LT

## 2017-12-08 PROCEDURE — 99024 POSTOP FOLLOW-UP VISIT: CPT | Mod: ,,, | Performed by: NURSE PRACTITIONER

## 2017-12-08 PROCEDURE — 99999 PR PBB SHADOW E&M-EST. PATIENT-LVL III: CPT | Mod: PBBFAC,,, | Performed by: NURSE PRACTITIONER

## 2017-12-08 PROCEDURE — 99213 OFFICE O/P EST LOW 20 MIN: CPT | Mod: PBBFAC,25 | Performed by: NURSE PRACTITIONER

## 2017-12-08 PROCEDURE — 73630 X-RAY EXAM OF FOOT: CPT | Mod: TC,LT

## 2017-12-08 PROCEDURE — 73552 X-RAY EXAM OF FEMUR 2/>: CPT | Mod: 26,LT,, | Performed by: RADIOLOGY

## 2017-12-08 NOTE — PROGRESS NOTES
Notified pt. Due to the weather CHERYL Martinez PA-C is not in today 11/08/17. R/S 12/8/17 at 1pm with DIONICIO Jimenez NP. Patient states verbal understanding and has no further questions.

## 2017-12-08 NOTE — PROGRESS NOTES
Ms. Bassett is an 89 y.o. female who had a low energy fall sustaining a  left intertrochanteric femur fracture which was treated with an IM nail of the femur on 10/26/2017.   She reports that she is doing well. She is back at home. Her pain is well controlled with tramadol as needed.      She denies fever, chills, and sweats since the time of the surgery. Her daughter is with her and reports that her heel is healing well- the dressing was changed this morning by the home health nurse. She is wearing a post operative shoe which is protective for the dressing. She denies pain in the foot.    Xrays of the femur and foot were done and reviewed by me with the patient and her daughter.    She will f/u with Meli in 6 weeks for her 3 month visit.

## 2017-12-13 ENCOUNTER — OFFICE VISIT (OUTPATIENT)
Dept: INTERNAL MEDICINE | Facility: CLINIC | Age: 82
End: 2017-12-13
Payer: MEDICARE

## 2017-12-13 ENCOUNTER — LAB VISIT (OUTPATIENT)
Dept: LAB | Facility: HOSPITAL | Age: 82
End: 2017-12-13
Attending: INTERNAL MEDICINE
Payer: MEDICARE

## 2017-12-13 ENCOUNTER — OFFICE VISIT (OUTPATIENT)
Dept: PODIATRY | Facility: CLINIC | Age: 82
End: 2017-12-13
Payer: MEDICARE

## 2017-12-13 VITALS
OXYGEN SATURATION: 99 % | SYSTOLIC BLOOD PRESSURE: 108 MMHG | HEART RATE: 48 BPM | TEMPERATURE: 98 F | HEIGHT: 59 IN | BODY MASS INDEX: 19.19 KG/M2 | DIASTOLIC BLOOD PRESSURE: 66 MMHG

## 2017-12-13 VITALS
SYSTOLIC BLOOD PRESSURE: 109 MMHG | HEIGHT: 59 IN | TEMPERATURE: 98 F | DIASTOLIC BLOOD PRESSURE: 69 MMHG | HEART RATE: 69 BPM | RESPIRATION RATE: 18 BRPM | WEIGHT: 95 LBS | BODY MASS INDEX: 19.15 KG/M2

## 2017-12-13 DIAGNOSIS — M81.0 OSTEOPOROSIS, POST-MENOPAUSAL: ICD-10-CM

## 2017-12-13 DIAGNOSIS — D64.9 ANEMIA, UNSPECIFIED TYPE: ICD-10-CM

## 2017-12-13 DIAGNOSIS — R11.0 NAUSEA: ICD-10-CM

## 2017-12-13 DIAGNOSIS — R63.0 POOR APPETITE: ICD-10-CM

## 2017-12-13 DIAGNOSIS — R23.4 ESCHAR OF HEEL: Primary | ICD-10-CM

## 2017-12-13 DIAGNOSIS — K22.4 ESOPHAGEAL DYSMOTILITY: Primary | ICD-10-CM

## 2017-12-13 DIAGNOSIS — R63.0 ANOREXIA: ICD-10-CM

## 2017-12-13 DIAGNOSIS — S72.142D CLOSED DISPLACED INTERTROCHANTERIC FRACTURE OF LEFT FEMUR WITH ROUTINE HEALING: ICD-10-CM

## 2017-12-13 PROBLEM — S72.002D ENCOUNTER FOR AFTERCARE FOR HEALING CLOSED TRAUMATIC FRACTURE OF LEFT HIP: Status: RESOLVED | Noted: 2017-10-27 | Resolved: 2017-12-13

## 2017-12-13 LAB
ALBUMIN SERPL BCP-MCNC: 3 G/DL
ALP SERPL-CCNC: 108 U/L
ALT SERPL W/O P-5'-P-CCNC: 11 U/L
ANION GAP SERPL CALC-SCNC: 9 MMOL/L
AST SERPL-CCNC: 21 U/L
BASOPHILS # BLD AUTO: 0.02 K/UL
BASOPHILS NFR BLD: 0.2 %
BILIRUB SERPL-MCNC: 0.3 MG/DL
BUN SERPL-MCNC: 15 MG/DL
CALCIUM SERPL-MCNC: 9.9 MG/DL
CHLORIDE SERPL-SCNC: 93 MMOL/L
CO2 SERPL-SCNC: 32 MMOL/L
CREAT SERPL-MCNC: 0.8 MG/DL
DIFFERENTIAL METHOD: ABNORMAL
EOSINOPHIL # BLD AUTO: 0.1 K/UL
EOSINOPHIL NFR BLD: 1.7 %
ERYTHROCYTE [DISTWIDTH] IN BLOOD BY AUTOMATED COUNT: 15.1 %
EST. GFR  (AFRICAN AMERICAN): >60 ML/MIN/1.73 M^2
EST. GFR  (NON AFRICAN AMERICAN): >60 ML/MIN/1.73 M^2
FERRITIN SERPL-MCNC: 328 NG/ML
GLUCOSE SERPL-MCNC: 89 MG/DL
HCT VFR BLD AUTO: 39.9 %
HGB BLD-MCNC: 12.4 G/DL
IRON SERPL-MCNC: 21 UG/DL
LYMPHOCYTES # BLD AUTO: 1.5 K/UL
LYMPHOCYTES NFR BLD: 18.3 %
MCH RBC QN AUTO: 29.5 PG
MCHC RBC AUTO-ENTMCNC: 31.1 G/DL
MCV RBC AUTO: 95 FL
MONOCYTES # BLD AUTO: 0.9 K/UL
MONOCYTES NFR BLD: 10.5 %
NEUTROPHILS # BLD AUTO: 5.7 K/UL
NEUTROPHILS NFR BLD: 69.1 %
PLATELET # BLD AUTO: 289 K/UL
PMV BLD AUTO: 10.2 FL
POTASSIUM SERPL-SCNC: 3.6 MMOL/L
PROT SERPL-MCNC: 7 G/DL
RBC # BLD AUTO: 4.21 M/UL
SATURATED IRON: 8 %
SODIUM SERPL-SCNC: 134 MMOL/L
TOTAL IRON BINDING CAPACITY: 275 UG/DL
TRANSFERRIN SERPL-MCNC: 186 MG/DL
WBC # BLD AUTO: 8.25 K/UL

## 2017-12-13 PROCEDURE — 83540 ASSAY OF IRON: CPT

## 2017-12-13 PROCEDURE — 36415 COLL VENOUS BLD VENIPUNCTURE: CPT

## 2017-12-13 PROCEDURE — 99999 PR PBB SHADOW E&M-EST. PATIENT-LVL III: CPT | Mod: PBBFAC,,, | Performed by: INTERNAL MEDICINE

## 2017-12-13 PROCEDURE — 99213 OFFICE O/P EST LOW 20 MIN: CPT | Mod: PBBFAC,27 | Performed by: INTERNAL MEDICINE

## 2017-12-13 PROCEDURE — 80053 COMPREHEN METABOLIC PANEL: CPT

## 2017-12-13 PROCEDURE — 82728 ASSAY OF FERRITIN: CPT

## 2017-12-13 PROCEDURE — 85025 COMPLETE CBC W/AUTO DIFF WBC: CPT

## 2017-12-13 PROCEDURE — 99213 OFFICE O/P EST LOW 20 MIN: CPT | Mod: PBBFAC | Performed by: PODIATRIST

## 2017-12-13 PROCEDURE — 99213 OFFICE O/P EST LOW 20 MIN: CPT | Mod: S$PBB,,, | Performed by: PODIATRIST

## 2017-12-13 PROCEDURE — 99999 PR PBB SHADOW E&M-EST. PATIENT-LVL III: CPT | Mod: PBBFAC,,, | Performed by: PODIATRIST

## 2017-12-13 PROCEDURE — 99214 OFFICE O/P EST MOD 30 MIN: CPT | Mod: S$PBB,,, | Performed by: INTERNAL MEDICINE

## 2017-12-13 RX ORDER — MIRTAZAPINE 7.5 MG/1
7.5 TABLET, FILM COATED ORAL NIGHTLY
Qty: 30 TABLET | Refills: 11 | Status: SHIPPED | OUTPATIENT
Start: 2017-12-13 | End: 2018-12-13

## 2017-12-13 RX ORDER — ONDANSETRON 4 MG/1
4 TABLET, ORALLY DISINTEGRATING ORAL EVERY 12 HOURS PRN
Qty: 30 TABLET | Refills: 2 | Status: SHIPPED | OUTPATIENT
Start: 2017-12-13

## 2017-12-13 NOTE — PROGRESS NOTES
Subjective:       Patient ID: Simona Bassett is a 89 y.o. female.    Chief Complaint: Follow-up    HPI   F/u hospitalization for femur fracture after a fall to floor when trying to open a package 10/25. . Back at home following stay At [facility] for 20 days.      Developed decubitus on L heel.  Wearing boot.     Before femur fracture,fractured bones in L foot.     Getting HH.  Walking with walker and assistance..     Getting reclasat.  Due in April for infusion.  Dexa due early July    Mild nausea, eating less.  No vomiting.  Doesn't want to eat.    Eating little.      Wt down to 95.  Speech therapist evaluated swallowing via HH.  Eats soups, liquids, ensure.  egd 2013.  Esophageal dysmotility , moderate, by May barium study.  Having good BM every 2-3 days.  takimng nexium.    Continues to smoke!!!!!    Not smoking  Review of Systems   Constitutional: Negative for fever and unexpected weight change.   HENT: Negative for congestion and postnasal drip.    Eyes: Negative for pain, discharge and visual disturbance.   Respiratory: Negative for cough, chest tightness, shortness of breath and wheezing.    Cardiovascular: Negative for chest pain and leg swelling.   Gastrointestinal: Negative for abdominal pain, constipation, diarrhea and nausea.   Genitourinary: Negative for difficulty urinating, dysuria and hematuria.   Skin: Negative for rash.   Neurological: Negative for headaches.   Psychiatric/Behavioral: Negative for dysphoric mood and sleep disturbance. The patient is not nervous/anxious.        Objective:      Physical Exam   Constitutional: She is oriented to person, place, and time. She appears well-developed and well-nourished.   Frail appearing, in w/c.   Eyes: No scleral icterus.   Neck: No JVD present. No thyromegaly present.   Cardiovascular: Normal rate, regular rhythm and normal heart sounds.    Pulmonary/Chest: Effort normal and breath sounds normal. No respiratory distress. She has no wheezes. She has no  rales.   Abdominal: Soft. She exhibits no mass. There is no tenderness.   Musculoskeletal: She exhibits no edema.   Neurological: She is alert and oriented to person, place, and time.   Psychiatric: She has a normal mood and affect. Her behavior is normal.       Assessment:       1. Esophageal dysmotility    2. Osteoporosis, post-menopausal    3. Poor appetite    4. Anemia, unspecified type    5. Nausea    6. Anorexia        Plan:       Simona was seen today for follow-up.    Diagnoses and all orders for this visit:    Esophageal dysmotility    Osteoporosis, post-menopausal    Poor appetite    Anemia, unspecified type  -     Comprehensive metabolic panel; Future  -     CBC auto differential; Future  -     Iron and TIBC; Future  -     Ferritin; Future    Nausea    Anorexia    Other orders  -     ondansetron (ZOFRAN-ODT) 4 MG TbDL; Take 1 tablet (4 mg total) by mouth every 12 (twelve) hours as needed.  -     mirtazapine (REMERON) 7.5 MG Tab; Take 1 tablet (7.5 mg total) by mouth every evening.           We discussed Palliative Care

## 2017-12-13 NOTE — PROGRESS NOTES
"Subjective:      Patient ID: Simona Bassett is a 89 y.o. female.    Chief Complaint: Follow-up (wound care ); Foot Ulcer; Dressing Change; and Heel Pain    Simona is a 89 y.o. female who presents to the clinic for evaluation and treatment of high risk feet. Simona has a past medical history of Cataract; Closed displaced intertrochanteric fracture of left femur with routine healing s/p IM tayo on 10/26/2017 (10/25/2017); Closed fracture of base of fifth metatarsal bone (10/25/2017); Constipation due to pain medication (5/7/2015); Dysphagia, unspecified(787.20); Gastroesophageal reflux disease without esophagitis (10/2/2014); Glaucoma; Hearing loss of both ears; MGUS (monoclonal gammopathy of unknown significance); Mixed hyperlipidemia (10/23/2012); Osteoporosis, post-menopausal (10/23/2012); and Tobacco abuse (10/23/2012). The patient's chief complaint is foot ulcer, L heel. Daughter states [facility] . Daughter applies betadine q day .    12/13/17: Mild pain to L heel . Daughter changes dressings on days which nurses do not visit     PCP: Twila Loera MD    Date Last Seen by PCP:   Chief Complaint   Patient presents with    Follow-up     wound care     Foot Ulcer    Dressing Change    Heel Pain       No results found for: HGBA1C      Review of Systems   Constitution: Negative for chills, decreased appetite and fever.   Cardiovascular: Negative for leg swelling.   Musculoskeletal: Positive for arthritis and muscle weakness. Negative for joint pain, joint swelling and myalgias.   Gastrointestinal: Negative for nausea and vomiting.   Neurological: Negative for loss of balance, numbness and paresthesias.           Objective:       Vitals:    12/13/17 1022   BP: 109/69   Pulse: 69   Resp: 18   Temp: 97.7 °F (36.5 °C)   TempSrc: Oral   Weight: 43.1 kg (95 lb)   Height: 4' 11" (1.499 m)   PainSc:   6   PainLoc: Foot        Physical Exam   Constitutional: She is oriented " to person, place, and time. She appears well-developed and well-nourished.   Cardiovascular:   Dorsalis pedis and posterior tibial pulses are diminished Bilaterally. Toes are cool to touch. Feet are warm proximally.There is decreased digital hair. Skin is atrophic, slightly hyperpigmented, and mildly edematous     Musculoskeletal: She exhibits no edema or tenderness.        Right ankle: Normal.        Left ankle: Normal.        Right foot: There is no swelling, no crepitus and no deformity.        Left foot: There is no swelling, no crepitus and no deformity.   Adequate joint range of motion without pain, limitation, nor crepitation Bilateral feet and ankle joints. Muscle strength is 5/5 in all groups bilaterally.         Lymphadenopathy:   No palpable lymph nodes   Neurological: She is alert and oriented to person, place, and time.   Skin: Skin is warm, dry and intact. No rash noted. No erythema. Nails show no clubbing.   L heel eschar firmly adhered w/o surrounding drainage, or deep probe 3.3x4.1 cm   Dorsal L foot superficial excoriation No surrounding erythema, edema, malodor, nor drainage noted      Psychiatric: She has a normal mood and affect. Her behavior is normal.   Vitals reviewed.            Assessment:       Encounter Diagnosis   Name Primary?    Eschar of heel Yes         Plan:       Simona was seen today for follow-up, foot ulcer, dressing change and heel pain.    Diagnoses and all orders for this visit:    Eschar of heel      I counseled the patient on her conditions, their implications and medical management.    Continue [facility] visits twice per week.   Debridement:loose skin trimmed to wound perimeter with forceps and sterile scissors   Dressings: Iodosorb   Offloading:foam     Follow-up:Patient is to return to the clinic in 2-3 weeks for follow-up but should call [facility] immediately if any signs of infection, such as fever, chills, sweats, increased redness or pain.    Short-term goals include  maintaining good offloading and minimizing bioburden, promoting granulation and epithelialization to healing.  Long-term goals include keeping the wound healed by good offloading and medical management under the direction of internist.      I discussed with the  patient  signs and symptoms of infection including redness, drainage, purulence, odor, pain, elevated BS, streaking, fever, chills, etc . Patient is   to seek medical attention (ER or urgent care) if these symptoms occur

## 2017-12-14 ENCOUNTER — TELEPHONE (OUTPATIENT)
Dept: INTERNAL MEDICINE | Facility: CLINIC | Age: 82
End: 2017-12-14

## 2017-12-14 PROBLEM — D62 ACUTE BLOOD LOSS AS CAUSE OF POSTOPERATIVE ANEMIA: Status: RESOLVED | Noted: 2017-10-27 | Resolved: 2017-12-14

## 2017-12-14 NOTE — TELEPHONE ENCOUNTER
----- Message from Patty Holden sent at 12/14/2017 10:37 AM CST -----  Contact: [facility]-[phone]  They want more orders for physical therapy an occupational therapy for five more weeks,please advise

## 2017-12-14 NOTE — TELEPHONE ENCOUNTER
Attempted to contact Mayra with [facility] to find out if verbal or written orders are needed to extend Occupational PT.

## 2018-01-02 ENCOUNTER — TELEPHONE (OUTPATIENT)
Dept: INTERNAL MEDICINE | Facility: CLINIC | Age: 83
End: 2018-01-02

## 2018-01-02 NOTE — TELEPHONE ENCOUNTER
We can just observe since bleeding occurred after a hard stool.  Use stool softener like colace, and fiber and Miralax if necessary..  Let me know if bleeding recurs .  Mucus is nothing to worry about.  It is ok to see podiatry.     If bleeding recurs I'd like her to see Colon Rectal surgery - they can do short scope in the office.

## 2018-01-02 NOTE — TELEPHONE ENCOUNTER
Spoke with patient daughter Brandi and she informed me her mother just had a bloody b/m yesterday along with dripping of mucus in the rectal area , and that she has been having hard stool lately and that may have been the cause , but now patient is no longer bleeding and is passing soft stool .    Patient daughter stated that she would like to know what should she do , her mother has an appointment on Wednesday with maximino.    Please advise

## 2018-01-02 NOTE — TELEPHONE ENCOUNTER
----- Message from Deandra Link sent at 1/2/2018  9:57 AM CST -----  Contact: Daughter  Brandi   [phone]  Patient is having some rectal bleeding. She has an appointment on tomorrow at  10:00 with a podiatrist so Brandi wanted to know if she need to be seen for this, and if so, can you see her if it's ok for her to see a NP? I offered to make an appointment but Brandi refused until she hear from your office.

## 2018-01-03 ENCOUNTER — OFFICE VISIT (OUTPATIENT)
Dept: PODIATRY | Facility: CLINIC | Age: 83
End: 2018-01-03
Payer: MEDICARE

## 2018-01-03 VITALS — DIASTOLIC BLOOD PRESSURE: 77 MMHG | SYSTOLIC BLOOD PRESSURE: 134 MMHG | HEART RATE: 72 BPM

## 2018-01-03 DIAGNOSIS — R23.4 ESCHAR OF HEEL: Primary | ICD-10-CM

## 2018-01-03 DIAGNOSIS — L97.522 SKIN ULCER OF LEFT FOOT WITH FAT LAYER EXPOSED: ICD-10-CM

## 2018-01-03 PROCEDURE — 99214 OFFICE O/P EST MOD 30 MIN: CPT | Mod: S$PBB,,, | Performed by: PODIATRIST

## 2018-01-03 PROCEDURE — 99213 OFFICE O/P EST LOW 20 MIN: CPT | Mod: PBBFAC | Performed by: PODIATRIST

## 2018-01-03 PROCEDURE — 99999 PR PBB SHADOW E&M-EST. PATIENT-LVL III: CPT | Mod: PBBFAC,,, | Performed by: PODIATRIST

## 2018-01-03 NOTE — PROGRESS NOTES
Subjective:      Patient ID: Simona Bassett is a 89 y.o. female.    Chief Complaint: No chief complaint on file.    Simona is a 89 y.o. female who presents to the clinic for evaluation and treatment of high risk feet. Simona has a past medical history of Cataract; Closed displaced intertrochanteric fracture of left femur with routine healing s/p IM tayo on 10/26/2017 (10/25/2017); Closed fracture of base of fifth metatarsal bone (10/25/2017); Constipation due to pain medication (5/7/2015); Dysphagia, unspecified(787.20); Gastroesophageal reflux disease without esophagitis (10/2/2014); Glaucoma; Hearing loss of both ears; MGUS (monoclonal gammopathy of unknown significance); Mixed hyperlipidemia (10/23/2012); Osteoporosis, post-menopausal (10/23/2012); and Tobacco abuse (10/23/2012). The patient's chief complaint is foot ulcer, L heel. Daughter states this began while recuperating at [facility] after leg fx . Daughter applies betadine q day .    12/13/17: Mild pain to L heel . Daughter changes dressings on days which nurses do not visit   1/3/18: Pts daughter reports scab fell off wound on the top of her mothers foot. She continues use of iodosorb  PCP: Twila Loera MD    Date Last Seen by PCP:   No chief complaint on file.      No results found for: HGBA1C      Review of Systems   Constitution: Negative for chills, decreased appetite and fever.   Cardiovascular: Negative for leg swelling.   Skin: Positive for dry skin.   Musculoskeletal: Positive for arthritis, joint pain and muscle weakness. Negative for joint swelling and myalgias.   Gastrointestinal: Negative for nausea and vomiting.   Neurological: Negative for loss of balance, numbness and paresthesias.           Objective:       Vitals:    01/03/18 1111   BP: 134/77   Pulse: 72   PainSc: 0-No pain   PainLoc: Foot        Physical Exam   Constitutional: She is oriented to person, place, and time. She appears well-developed and well-nourished.    Cardiovascular:   Dorsalis pedis and posterior tibial pulses are diminished Bilaterally. Toes are cool to touch. Feet are warm proximally.There is decreased digital hair. Skin is atrophic, slightly hyperpigmented, and mildly edematous     Musculoskeletal: She exhibits no edema or tenderness.        Right ankle: Normal.        Left ankle: Normal.        Right foot: There is no swelling, no crepitus and no deformity.        Left foot: There is no swelling, no crepitus and no deformity.   Adequate joint range of motion without pain, limitation, nor crepitation Bilateral feet and ankle joints. Muscle strength is 5/5 in all groups bilaterally.         Lymphadenopathy:   No palpable lymph nodes   Neurological: She is alert and oriented to person, place, and time.   Skin: Skin is warm, dry and intact. No rash noted. No erythema. Nails show no clubbing.   L heel eschar firmly adhered w/o surrounding drainage, or deep probe 3.3x4.1 cm     Dorsal L foot ulcer w / exposed flexor tendons which appear healthy. Normal in color.  No surrounding erythema, edema, malodor, nor drainage noted      Psychiatric: She has a normal mood and affect. Her behavior is normal.   Vitals reviewed.            Assessment:       Encounter Diagnoses   Name Primary?    Eschar of heel Yes    Skin ulcer of left foot with fat layer exposed          Plan:       Diagnoses and all orders for this visit:    Eschar of heel  -     VAS Ankle Brachial Indices Resting; Future    Skin ulcer of left foot with fat layer exposed      I counseled the patient on her conditions, their implications and medical management.    Continue [facility] visits twice per week.   SANTA ordered   Debridement:loose skin trimmed to wound perimeter with forceps and sterile scissors   Dressings: betadine  L heel, ag gel to dorsal foot wound   Offloading:foam     Follow-up:Patient is to return to the clinic in 2-3 weeks for follow-up but should call [facility] immediately if any signs of  infection, such as fever, chills, sweats, increased redness or pain.    Short-term goals include maintaining good offloading and minimizing bioburden, promoting granulation and epithelialization to healing.  Long-term goals include keeping the wound healed by good offloading and medical management under the direction of internist.      I discussed with the  patient  signs and symptoms of infection including redness, drainage, purulence, odor, pain, elevated BS, streaking, fever, chills, etc . Patient is   to seek medical attention (ER or urgent care) if these symptoms occur      35 minutes of face-to-face spent in direct  counseling and coordination of care

## 2018-01-04 ENCOUNTER — TELEPHONE (OUTPATIENT)
Dept: PODIATRY | Facility: CLINIC | Age: 83
End: 2018-01-04

## 2018-01-04 NOTE — TELEPHONE ENCOUNTER
Patient daughter has been informed that she is to observe her mother stool to make sure she is no longer having any bleeding .  Also to use colace which is a stool softener such as colace , fiber , and Miralax if necessary.  Patient daughter has been advised that the mucus is nothing to worry about and that if the bleeding recurs to please reach out to Dr. Hannah office.    Patient daughter ha also been informed that if bleeding does recurs that Dr. Loera would like for her mother to see colon rectal surgery for a possible short scope in office.    Patient daughter verbalized great understanding .

## 2018-01-04 NOTE — TELEPHONE ENCOUNTER
----- Message from Casandra Gutiérrez sent at 1/4/2018  8:57 AM CST -----  Contact: [email or web address]#[phone] fax#[phone]  Patient needs wound care orders faxed over.    Orders faxed over on 01/04/2018

## 2018-01-08 ENCOUNTER — PATIENT MESSAGE (OUTPATIENT)
Dept: ADMINISTRATIVE | Facility: OTHER | Age: 83
End: 2018-01-08

## 2018-01-16 ENCOUNTER — TELEPHONE (OUTPATIENT)
Dept: INTERNAL MEDICINE | Facility: CLINIC | Age: 83
End: 2018-01-16

## 2018-01-16 NOTE — TELEPHONE ENCOUNTER
Spoke with Nurse Matias everything is being re certified for the pt. Just need paperworked signed for orders, will be faxed.

## 2018-01-16 NOTE — TELEPHONE ENCOUNTER
----- Message from Analisa Velázquez sent at 1/16/2018  8:47 AM CST -----  Contact: [facility]/[phone]    Type:Home Health(orders,updates,clarifications,etc)    Home Health Agency[facility]Nurse: [facility]    Phone number: [phone]    Reason for call:    Comments: to inform that patient is being re certified for home health services. She is receiving a nurse for wound care, a home health for personal care, and OT and PT. Thank you!!!

## 2018-01-19 ENCOUNTER — HOSPITAL ENCOUNTER (OUTPATIENT)
Dept: VASCULAR SURGERY | Facility: CLINIC | Age: 83
Discharge: HOME | End: 2018-01-19
Attending: PODIATRIST
Payer: MEDICARE

## 2018-01-19 DIAGNOSIS — I73.9 PERIPHERAL VASCULAR DISEASE, UNSPECIFIED: ICD-10-CM

## 2018-01-19 DIAGNOSIS — R23.4 ESCHAR OF HEEL: ICD-10-CM

## 2018-01-19 PROCEDURE — 93923 UPR/LXTR ART STDY 3+ LVLS: CPT | Mod: PBBFAC | Performed by: SURGERY

## 2018-01-19 PROCEDURE — 93923 UPR/LXTR ART STDY 3+ LVLS: CPT | Mod: 26,S$PBB,, | Performed by: SURGERY

## 2018-01-23 ENCOUNTER — PATIENT MESSAGE (OUTPATIENT)
Dept: PODIATRY | Facility: CLINIC | Age: 83
End: 2018-01-23

## 2018-01-23 ENCOUNTER — TELEPHONE (OUTPATIENT)
Dept: PODIATRY | Facility: CLINIC | Age: 83
End: 2018-01-23

## 2018-01-23 DIAGNOSIS — I73.9 PVD (PERIPHERAL VASCULAR DISEASE): Primary | ICD-10-CM

## 2018-01-24 ENCOUNTER — OFFICE VISIT (OUTPATIENT)
Dept: PODIATRY | Facility: CLINIC | Age: 83
End: 2018-01-24
Payer: MEDICARE

## 2018-01-24 VITALS
HEIGHT: 59 IN | BODY MASS INDEX: 19.15 KG/M2 | DIASTOLIC BLOOD PRESSURE: 64 MMHG | HEART RATE: 70 BPM | RESPIRATION RATE: 18 BRPM | WEIGHT: 95 LBS | SYSTOLIC BLOOD PRESSURE: 107 MMHG | TEMPERATURE: 98 F

## 2018-01-24 DIAGNOSIS — L97.422 HEEL ULCER, LEFT, WITH FAT LAYER EXPOSED: ICD-10-CM

## 2018-01-24 DIAGNOSIS — I73.9 PVD (PERIPHERAL VASCULAR DISEASE): Primary | ICD-10-CM

## 2018-01-24 DIAGNOSIS — L97.522 SKIN ULCER OF LEFT FOOT WITH FAT LAYER EXPOSED: ICD-10-CM

## 2018-01-24 PROCEDURE — 99999 PR PBB SHADOW E&M-EST. PATIENT-LVL III: CPT | Mod: PBBFAC,,, | Performed by: PODIATRIST

## 2018-01-24 PROCEDURE — 99499 UNLISTED E&M SERVICE: CPT | Mod: S$PBB,,, | Performed by: PODIATRIST

## 2018-01-24 PROCEDURE — 11042 DBRDMT SUBQ TIS 1ST 20SQCM/<: CPT | Mod: S$PBB,,, | Performed by: PODIATRIST

## 2018-01-24 PROCEDURE — 11042 DBRDMT SUBQ TIS 1ST 20SQCM/<: CPT | Mod: PBBFAC | Performed by: PODIATRIST

## 2018-01-24 PROCEDURE — 99213 OFFICE O/P EST LOW 20 MIN: CPT | Mod: PBBFAC,25 | Performed by: PODIATRIST

## 2018-01-24 RX ORDER — MUPIROCIN 20 MG/G
OINTMENT TOPICAL
COMMUNITY
Start: 2017-12-26

## 2018-01-25 ENCOUNTER — TELEPHONE (OUTPATIENT)
Dept: PODIATRY | Facility: CLINIC | Age: 83
End: 2018-01-25

## 2018-01-25 NOTE — PROGRESS NOTES
Subjective:      Patient ID: Simona Bassett is a 89 y.o. female.    Chief Complaint: Follow-up (left heel wound ); Foot Ulcer; Dressing Change; and Foot Pain    Simona is a 89 y.o. female who presents to the clinic for evaluation and treatment of high risk feet. Simona has a past medical history of Cataract; Closed displaced intertrochanteric fracture of left femur with routine healing s/p IM tayo on 10/26/2017 (10/25/2017); Closed fracture of base of fifth metatarsal bone (10/25/2017); Constipation due to pain medication (5/7/2015); Dysphagia, unspecified(787.20); Gastroesophageal reflux disease without esophagitis (10/2/2014); Glaucoma; Hearing loss of both ears; MGUS (monoclonal gammopathy of unknown significance); Mixed hyperlipidemia (10/23/2012); Osteoporosis, post-menopausal (10/23/2012); and Tobacco abuse (10/23/2012). The patient's chief complaint is foot ulcer, L heel. Daughter states this began while recuperating at [facility] after leg fx . Daughter applies betadine q day .    12/13/17: Mild pain to L heel . Daughter changes dressings on days which nurses do not visit   1/3/18: Pts daughter reports scab fell off wound on the top of her mothers foot. She continues use of iodosorb  1/24/18: pt's daughter reports mild pain reported by her mother during ambulation. continues dressing changes. No issues w/ [facility]   PCP: Twila Loear MD    Date Last Seen by PCP:   Chief Complaint   Patient presents with    Follow-up     left heel wound     Foot Ulcer    Dressing Change    Foot Pain       No results found for: HGBA1C      Review of Systems   Constitution: Negative for chills, decreased appetite and fever.   Cardiovascular: Negative for leg swelling.   Skin: Positive for dry skin.   Musculoskeletal: Positive for arthritis, joint pain and muscle weakness. Negative for joint swelling and myalgias.   Gastrointestinal: Negative for nausea and vomiting.   Neurological: Negative for loss of balance, numbness and  "paresthesias.           Objective:       Vitals:    01/24/18 1004   BP: 107/64   Pulse: 70   Resp: 18   Temp: 97.5 °F (36.4 °C)   TempSrc: Oral   Weight: 43.1 kg (95 lb)   Height: 4' 11" (1.499 m)   PainSc: 0-No pain        Physical Exam   Constitutional: She is oriented to person, place, and time. She appears well-developed and well-nourished.   Cardiovascular:   Dorsalis pedis and posterior tibial pulses are decreased Bilaterally. Toes are cool to touch. Feet are warm proximally.There is decreased digital hair.    Musculoskeletal: She exhibits tenderness (mild L heel ). She exhibits no edema.        Right ankle: Normal.        Left ankle: Normal.        Right foot: There is no swelling, no crepitus and no deformity.        Left foot: There is no swelling, no crepitus and no deformity.   Adequate joint range of motion without pain, limitation, nor crepitation Bilateral feet and ankle joints. Muscle strength is 5/5 in all groups bilaterally.         Lymphadenopathy:   No palpable lymph nodes   Neurological: She is alert and oriented to person, place, and time.   Skin: Skin is warm, dry and intact. No rash noted. No erythema. Nails show no clubbing.   Ulcer location: dorsal L foot    Measurements : 0.9x0.5x0.3 cm   Signs of infection: exposed tendon  Drainage: serous-scant   Periwound: intact   Base: fibrotic w/ exposed extensor tendon No surrounding erythema, edema, malodor, nor drainage noted         Ulcer location: posterior L heel    Measurements : 2.8x3.6x0.2 cm   Signs of infection: none  Drainage: serousang  Periwound: intact   Base: 80/20 fibrotic/granular      Psychiatric: She has a normal mood and affect. Her behavior is normal.   Vitals reviewed.                    Assessment:       Encounter Diagnoses   Name Primary?    PVD (peripheral vascular disease) Yes    Skin ulcer of left foot with fat layer exposed     Heel ulcer, left, with fat layer exposed          Plan:       Simona was seen today for " follow-up, foot ulcer, dressing change and foot pain.    Diagnoses and all orders for this visit:    PVD (peripheral vascular disease)  -     SUBSEQUENT HOME HEALTH ORDERS    Skin ulcer of left foot with fat layer exposed  -     SUBSEQUENT HOME HEALTH ORDERS    Heel ulcer, left, with fat layer exposed  -     SUBSEQUENT HOME HEALTH ORDERS      I counseled the patient on her conditions, their implications and medical management.    Continue [facility] visits   clinically the wounds are stable, non infected but due to chronicity, appearance and patients vascular status she is at risk for delayed healing, infection, etc.   Discussed abis in detail w/ pts daughter   Vascular sx  Consult placed  Today heel eschar was 80% loose, thus debrided. See below    Debridement:Nonviable tissues ( posterior heel) were debrided beyond the level of  sub Q utilizing a  sterile No. 15 scalpel and forceps. Minimal bleeding controlled with direct pressure  The patient tolerated this well.      Dressings:iodosorb  L heel, ag gel to dorsal foot wound   Offloading:foam     Follow-up:Patient is to return to the clinic in 2-3 weeks for follow-up but should call [facility] immediately if any signs of infection, such as fever, chills, sweats, increased redness or pain.    Short-term goals include maintaining good offloading and minimizing bioburden, promoting granulation and epithelialization to healing.  Long-term goals include keeping the wound healed by good offloading and medical management under the direction of internist.      I discussed with the  patient  signs and symptoms of infection including redness, drainage, purulence, odor, pain, elevated BS, streaking, fever, chills, etc . Patient is   to seek medical attention (ER or urgent care) if these symptoms occur

## 2018-01-26 ENCOUNTER — TELEPHONE (OUTPATIENT)
Dept: PODIATRY | Facility: CLINIC | Age: 83
End: 2018-01-26

## 2018-01-26 NOTE — TELEPHONE ENCOUNTER
----- Message from Casandra Gutiérrez sent at 1/26/2018  8:20 AM CST -----  Contact: [email or web address]#[phone] fax#[phone]  Patient recent wound care orders needs to be faxed over.

## 2018-01-29 ENCOUNTER — TELEPHONE (OUTPATIENT)
Dept: INTERNAL MEDICINE | Facility: CLINIC | Age: 83
End: 2018-01-29

## 2018-01-29 NOTE — TELEPHONE ENCOUNTER
Pt would like a second opinion regarding wound care, pt's daughter is requesting a referral to Vascular Surgery for a consultation. Daughter would like to know if it is worth it to do an angiogram on her pt's legs. Please advise.

## 2018-01-29 NOTE — TELEPHONE ENCOUNTER
----- Message from Kimberley Barron sent at 1/29/2018 10:04 AM CST -----  Contact: Brandi/daughter  [phone]  Patient daughter would like to speak to nurse regarding Dr. Yesenia Treadwell/PODIATRY, requesting pt see VASCULAR SURGERY.    Please advise

## 2018-01-31 ENCOUNTER — TELEPHONE (OUTPATIENT)
Dept: INTERNAL MEDICINE | Facility: CLINIC | Age: 83
End: 2018-01-31

## 2018-01-31 DIAGNOSIS — L97.409 ULCER OF HEEL, UNSPECIFIED LATERALITY, UNSPECIFIED ULCER STAGE: Primary | ICD-10-CM

## 2018-02-07 ENCOUNTER — TELEPHONE (OUTPATIENT)
Dept: WOUND CARE | Facility: CLINIC | Age: 83
End: 2018-02-07

## 2018-02-07 ENCOUNTER — OFFICE VISIT (OUTPATIENT)
Dept: WOUND CARE | Facility: CLINIC | Age: 83
End: 2018-02-07
Payer: MEDICARE

## 2018-02-07 ENCOUNTER — HOSPITAL ENCOUNTER (OUTPATIENT)
Dept: RADIOLOGY | Facility: HOSPITAL | Age: 83
Discharge: HOME | End: 2018-02-07
Attending: NURSE PRACTITIONER
Payer: MEDICARE

## 2018-02-07 DIAGNOSIS — L89.620 DECUBITUS ULCER OF LEFT HEEL, UNSTAGEABLE: ICD-10-CM

## 2018-02-07 DIAGNOSIS — I73.9 PERIPHERAL VASCULAR DISEASE, UNSPECIFIED: Primary | ICD-10-CM

## 2018-02-07 DIAGNOSIS — L97.523 SKIN ULCER OF LEFT FOOT WITH NECROSIS OF MUSCLE: ICD-10-CM

## 2018-02-07 DIAGNOSIS — R93.89 ABNORMAL FINDINGS ON DIAGNOSTIC IMAGING OF OTHER SPECIFIED BODY STRUCTURES: ICD-10-CM

## 2018-02-07 PROCEDURE — 11042 DBRDMT SUBQ TIS 1ST 20SQCM/<: CPT | Mod: S$PBB,,, | Performed by: NURSE PRACTITIONER

## 2018-02-07 PROCEDURE — 99999 PR PBB SHADOW E&M-EST. PATIENT-LVL III: CPT | Mod: PBBFAC,,, | Performed by: NURSE PRACTITIONER

## 2018-02-07 PROCEDURE — 73650 X-RAY EXAM OF HEEL: CPT | Mod: TC,FY,LT

## 2018-02-07 PROCEDURE — 99203 OFFICE O/P NEW LOW 30 MIN: CPT | Mod: 25,,, | Performed by: NURSE PRACTITIONER

## 2018-02-07 PROCEDURE — 1159F MED LIST DOCD IN RCRD: CPT | Mod: ,,, | Performed by: NURSE PRACTITIONER

## 2018-02-07 PROCEDURE — 73650 X-RAY EXAM OF HEEL: CPT | Mod: 26,LT,, | Performed by: RADIOLOGY

## 2018-02-07 PROCEDURE — 99213 OFFICE O/P EST LOW 20 MIN: CPT | Mod: PBBFAC,25 | Performed by: NURSE PRACTITIONER

## 2018-02-07 PROCEDURE — 11042 DBRDMT SUBQ TIS 1ST 20SQCM/<: CPT | Mod: PBBFAC | Performed by: NURSE PRACTITIONER

## 2018-02-07 NOTE — LETTER
February 7, 2018      Twila Loera MD  [address]  [facility] [address]           [facility]  [address]  [facility] [address]  Phone: [phone]          Patient: Simona Bassett   MR Number: [id number]   YOB: 1927   Date of Visit: 2/7/2018       Dear Dr. Twila Loera:    Thank you for referring Simona Bassett to me for evaluation. Attached you will find relevant portions of my assessment and plan of care.    If you have questions, please do not hesitate to call me. I look forward to following Simona Bassett along with you.    Sincerely,    Lydia Rao, TRACEY    Enclosure  CC:  No Recipients    If you would like to receive this communication electronically, please contact [email or web address] or [phone] to request more information on [facility] Link access.    For providers and/or their staff who would like to refer a patient to [facility], please contact us through our one-stop-shop provider referral line, [facility], at [phone].    If you feel you have received this communication in error or would no longer like to receive these types of communications, please e-mail [email or web address]

## 2018-02-07 NOTE — PATIENT INSTRUCTIONS
Wound Care  Taking proper care of your wound will help it heal. Your healthcare provider may show you how to clean and dress the wound. He or she will also explain how to tell if the wound is healing normally. Here are the basic steps:      A wound that's not healing normally may be dark in color or have white streaks.    Wash Your Hands  · Use liquid soap and lather for 2 minutes. Scrub between your fingers and under your nails.  · Rinse with warm water, keeping your fingers pointing down.  · Use a paper towel to dry your hands and to turn off the faucet.  Remove the Used Dressing  · Set up your supplies.  · Put on disposable gloves if youre dressing a wound for someone else or your wound is infected.  · Gently take off the old dressing. If you have a drain or tube in the wound, be careful not to pull on it.  · Loosen the tape by pulling gently toward the wound.  · Remove the dressing one layer at a time and put it in a plastic bag.  · Remove your gloves.  Inspect and Dress the Wound  · Each time you change the dressing, inspect the wound carefully to be sure its healing normally.  · Wash your hands again. Put on a new pair of gloves if youre dressing a wound for someone else or if your wound is infected.  · Clean and dress the wound as directed by your doctor or nurse. If you have a drain or tube, be careful not to pull on it.  · Put all supplies in a plastic bag; seal the bag and put it in the trash.  · Be sure to wash your hands again.  Call your healthcare provider if you see any of the following signs of a problem:   · Bleeding that soaks the dressing  · Pink fluid weeping from the wound  · Increased drainage or drainage that is yellow, yellow-green, or foul-smelling  · Increased swelling or pain, or redness or swelling in the skin around the wound  · A change in the color of the wound  · An increase in the size of the wound  · A fever over 101.0°F, increased fatigue, or a loss of appetite.       ©  4922-1546 [facility], [address], [place] [address]. All rights reserved. This information is not intended as a substitute for professional medical care. Always follow your healthcare professional's instructions.    Smoking and Peripheral Arterial Disease (PAD)  Smoking is the greatest single danger to the health of your arteries. It puts you at higher risk of peripheral arterial disease (PAD). PAD is a disease of arteries in the legs. If you have PAD, its likely that other parts of the body are diseased, too. That puts you at high risk of heart attack or stroke. Read on to learn how smoking can lead to PAD and affect your health.  How Can Smoking Lead to Peripheral Arterial Disease?  Smoking causes inflammation that leads to plaque formation. Plaque is waxy material made up of cholesterol and other particles that can build up in artery walls. When there is too much plaque, the arteries can become narrowed and restrict blood flow. This then increases the risk of PAD and blood clots. It also worsens other risk factors such as high blood pressure and high cholesterol. These are things that make you more likely to develop arterial disease.  What Happens If You Dont Quit Smoking?  · You have 2 to 4 times the risk of dying of a heart attack or stroke as a nonsmoker does.  · You have an increased risk of developing severe PAD, claudication, gangrene, or needing to have a leg or foot amputated.  · You have an increased risk of developing an abdominal aortic aneurysm (AAA). This is a bulge in the aorta, a major artery. It can rupture suddenly and be fatal.  What Happens If You Quit Smoking?  · Your risk of heart attack and stroke drops as soon as you quit smoking. After 1 year of not smoking, your risk of heart attack will fall by 50%. In 5 to 15 years after you quit, risk of heart attack or stroke is similar to that of someone who never smoked.  · Your risk of amputation and other complications of PAD is  reduced.  · Your risk of developing AAA decreases.  © 3631-2452 [employer], [address] [place] [address], [place] [address]. All rights reserved. This information is not intended as a substitute for professional medical care. Always follow your healthcare professional's instructions.    You may shower using a mild soap such as Dove.  Irrigate the wounds with lukewarm water for 5 minutes and dry thoroughly.  Apply santyl to the wound, cover with cotton gauze and secure with roll gauze.  Change dressing daily.  Report any signs of infection.    On days when you do not shower, cleanse wounds with wound cleanser prior to dressing wounds.

## 2018-02-07 NOTE — PROGRESS NOTES
Subjective:       Patient ID: Simona Bassett is a 89 y.o. female.    Chief Complaint: Wound Check    HPI   This is a 89 year old female referred by Dr. Loera for evaluation and management of ulcers to the left dorsal foot and heel.  She broke the left foot in early October 2017 and was given some type of a boot to wear.  This rubbed and caused the wound on the top of the foot.  She then broke the leg in late October and this is when she developed an ulcer to the left heel.  She has home health through [facility] twice weekly.  She has been using betadine on the periwound area, normal gel on the dorsal foot and iodosorb gel on the heel.  The daughter reports no improvement with this therapy.  She had a vascular lab study performed which showed moderate PAOD in the left leg.  She is scheduled to see Dr. De Leon this Friday.  She is afebrile.  She denies increased redness, swelling or purulent drainage.  She does not currently complain of pain.  She also has hypoalbuminemia secondary to protein calorie malnutrition. Her appetite is poor and she is drinking one to two boost daily.  She is also smoking 1/2 pack of cigarettes daily.  Review of Systems   Constitutional: Negative for chills, diaphoresis and fever.   HENT: Positive for hearing loss and trouble swallowing. Negative for postnasal drip, rhinorrhea, sinus pressure, sneezing, sore throat and tinnitus.    Eyes: Positive for visual disturbance.   Respiratory: Positive for cough and shortness of breath. Negative for apnea and wheezing.    Cardiovascular: Negative for chest pain, palpitations and leg swelling.   Gastrointestinal: Positive for constipation. Negative for diarrhea, nausea and vomiting.   Genitourinary: Negative for difficulty urinating, dysuria, frequency and hematuria.   Musculoskeletal: Positive for back pain. Negative for arthralgias and joint swelling.   Skin: Positive for wound.   Neurological: Negative for dizziness, weakness,  light-headedness and headaches.   Hematological: Bruises/bleeds easily.   Psychiatric/Behavioral: Negative for confusion, decreased concentration, dysphoric mood and sleep disturbance. The patient is nervous/anxious.        Objective:      Physical Exam   Constitutional: She is oriented to person, place, and time. She appears well-developed and well-nourished. No distress.   HENT:   Head: Normocephalic and atraumatic.   Mouth/Throat: Oropharynx is clear and moist.   Eyes: Conjunctivae and EOM are normal. Pupils are equal, round, and reactive to light. Right eye exhibits no discharge. Left eye exhibits no discharge. No scleral icterus.   Neck: Neck supple. No JVD present. No tracheal deviation present. No thyromegaly present.   Cardiovascular: Normal rate, regular rhythm and normal heart sounds.  Exam reveals no gallop and no friction rub.    No murmur heard.  Pulmonary/Chest: Effort normal and breath sounds normal. No respiratory distress. She has no wheezes. She has no rales.   Abdominal: Soft. Bowel sounds are normal. She exhibits no distension. There is no tenderness.   Musculoskeletal: She exhibits no edema or tenderness.        Thoracic back: She exhibits deformity (kyphosis).        Feet:    Neurological: She is alert and oriented to person, place, and time.   Skin: Skin is warm and dry. No rash noted. She is not diaphoretic. No erythema.   Psychiatric: She has a normal mood and affect. Her behavior is normal. Judgment and thought content normal.   Nursing note and vitals reviewed.      Lower Extremities Segmental Pressure [mmHg]:                    Right             Left  _______________________________________________________________  Brachial          139               122  Calf              123               98  Posterior Tibial  120               88  Dorsalis Pedis    105               78  SANTA (Post. Tib.)  0.86              0.63  SANTA (Dors. Ped.)  0.76              0.56    Report Summary:  Impression:    Rt SANTA (0.86) Segment/Brachial Index and PVR waveforms indicate mild peripheral arterial obstructive disease.    Lt SANTA (0.56) Segment/Brachial Index and PVR waveforms indicate moderate peripheral arterial obstructive disease.A vascular lab study performed on 1/3/18 revealed:    ..  Lab Results   Component Value Date    ALBUMIN 3.0 (L) 12/13/2017     Procedure: Simona was seen in the clinic room and placed in the supine position on the treatment table. Attention was directed to the ulcer which was located on the left heel, where an wound measuring 2.8x3.9x0.5 cm is noted. The wound was covered with 100% necrotic tissue. No acute signs of infection were noted. The wound was non-tender. The wound was prepped with alcohol. Utilizing a #15 scalpel and pickups, I debrided the wound full-thickness through skin and subcutaneous tissue to excise viable and nonviable tissue inside the wound margins. The patient tolerated well. Because of a lack of sensation, anesthesia was not necessary. The wound was flushed with wound . There was minimal bleeding with debridement which was well controlled with direct pressure. The wound was then dressed with medihoney gel. The patient tolerated the procedure well.  Assessment:       1. Peripheral vascular disease, unspecified    2. Skin ulcer of left foot with necrosis of muscle    3. Decubitus ulcer of left heel, unstageable        Plan:           CBC, CMP, ESR, CRP, prealbumin.  Xray left heel to rule out osteo.  Santyl to left foot and heel ulcers daily, cover with gauze and secure with roll gauze.  Darco shoe left foot.  Keep appointment with Dr. De Leon as scheduled February 9, 2018.  Return to clinic in 2 weeks.    Unstageable left heel decubitus    Stage IV left dorsal foot ulcer

## 2018-02-08 DIAGNOSIS — M48.56XS NON-TRAUMATIC COMPRESSION FRACTURE OF THIRD LUMBAR VERTEBRA, SEQUELA: Primary | ICD-10-CM

## 2018-02-08 RX ORDER — TRAMADOL HYDROCHLORIDE 50 MG/1
TABLET ORAL
Qty: 90 TABLET | Refills: 0 | Status: SHIPPED | OUTPATIENT
Start: 2018-02-08 | End: 2018-02-20 | Stop reason: SDUPTHER

## 2018-02-09 ENCOUNTER — INITIAL CONSULT (OUTPATIENT)
Dept: VASCULAR SURGERY | Facility: CLINIC | Age: 83
End: 2018-02-09
Payer: MEDICARE

## 2018-02-09 VITALS
HEIGHT: 58 IN | SYSTOLIC BLOOD PRESSURE: 124 MMHG | TEMPERATURE: 98 F | DIASTOLIC BLOOD PRESSURE: 73 MMHG | HEART RATE: 67 BPM | BODY MASS INDEX: 17.3 KG/M2 | WEIGHT: 82.44 LBS

## 2018-02-09 DIAGNOSIS — I70.209 ATHEROSCLEROTIC PVD WITH ULCERATION: ICD-10-CM

## 2018-02-09 DIAGNOSIS — L98.499 ATHEROSCLEROTIC PVD WITH ULCERATION: ICD-10-CM

## 2018-02-09 DIAGNOSIS — I70.244 ATHEROSCLEROSIS OF NATIVE ARTERY OF LEFT LOWER EXTREMITY WITH ULCERATION OF HEEL: Primary | ICD-10-CM

## 2018-02-09 PROBLEM — I70.25 ATHEROSCLEROSIS OF NATIVE ARTERY OF EXTREMITY WITH ULCERATION: Status: ACTIVE | Noted: 2018-02-09

## 2018-02-09 PROCEDURE — 99999 PR PBB SHADOW E&M-EST. PATIENT-LVL III: CPT | Mod: PBBFAC,,, | Performed by: SURGERY

## 2018-02-09 PROCEDURE — 1126F AMNT PAIN NOTED NONE PRSNT: CPT | Mod: ,,, | Performed by: SURGERY

## 2018-02-09 PROCEDURE — 99215 OFFICE O/P EST HI 40 MIN: CPT | Mod: S$PBB,,, | Performed by: SURGERY

## 2018-02-09 PROCEDURE — 99213 OFFICE O/P EST LOW 20 MIN: CPT | Mod: PBBFAC | Performed by: SURGERY

## 2018-02-09 PROCEDURE — 1159F MED LIST DOCD IN RCRD: CPT | Mod: ,,, | Performed by: SURGERY

## 2018-02-09 RX ORDER — MUPIROCIN 20 MG/G
OINTMENT TOPICAL
Status: CANCELLED | OUTPATIENT
Start: 2018-02-09

## 2018-02-09 RX ORDER — LIDOCAINE HYDROCHLORIDE 10 MG/ML
1 INJECTION, SOLUTION EPIDURAL; INFILTRATION; INTRACAUDAL; PERINEURAL ONCE
Status: CANCELLED | OUTPATIENT
Start: 2018-02-09 | End: 2018-02-09

## 2018-02-09 NOTE — LETTER
February 9, 2018      Yesenia Treadwell, DPM  [address] [facility] [address]           [facility] - Vascular Surgery  [address]  [place] [address]  Phone: [phone]  Fax: [phone]          Patient: Simona Bassett   MR Number: [id number]   YOB: 1927   Date of Visit: 2/9/2018       Dear Dr. Yesenia Treadwell:    Thank you for referring Simona Bassett to me for evaluation. Attached you will find relevant portions of my assessment and plan of care.    If you have questions, please do not hesitate to call me. I look forward to following Simona Bassett along with you.    Sincerely,    JASON De Leon III, MD    Enclosure  CC:  No Recipients    If you would like to receive this communication electronically, please contact [email or web address] or [phone] to request more information on [facility] Link access.    For providers and/or their staff who would like to refer a patient to [facility], please contact us through our one-stop-shop provider referral line, [facility], at [phone].    If you feel you have received this communication in error or would no longer like to receive these types of communications, please e-mail [email or web address]

## 2018-02-09 NOTE — PROGRESS NOTES
Simona Bassett  02/09/2018    HPI:  Patient is a 89 y.o. female with a long h/o tobacco abuse who had IMN of left femur for intertrochanteric fracture in 10/'17 and subsequently developed left heel pressure ulcer that has not been healing properly and also developed pressure ulcer on dorsum of her left foot from ill fitting boot. She is currently living with her daughter and does have proper heel offloading while in bed and proper home health wound care.     She takes ASA 81 and Statin.    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 prostate    No Known Problems Daughter     No Known Problems Son     No Known Problems Son     No Known Problems Son     No Known Problems Son     No Known Problems Daughter     Colon cancer Neg Hx     Cystic fibrosis Neg Hx     Esophageal cancer Neg Hx     Hemochromatosis Neg Hx     Inflammatory bowel disease Neg Hx     Liver cancer Neg Hx     Liver disease Neg Hx     Rectal cancer Neg  Hx     Stomach cancer Neg Hx     Derrick's disease Neg Hx     Celiac disease Neg Hx      Social History     Social History    Marital status:      Spouse name: N/A    Number of children: 6    Years of education: N/A     Occupational History    Not on file.     Social History Main Topics    Smoking status: Current Every Day Smoker     Packs/day: 1.00     Years: 60.00    Smokeless tobacco: Never Used    Alcohol use No    Drug use: No    Sexual activity: Not on file     Other Topics Concern    Not on file     Social History Narrative    No narrative on file     Review of patient's allergies indicates:  No Known Allergies    Current Outpatient Prescriptions:     ascorbic acid, vitamin C, (VITAMIN C) 250 MG tablet, Take 250 mg by mouth once daily., Disp: , Rfl:     aspirin 81 mg Tab, Every day, Disp: , Rfl:     calcium-vitamin D3 (CALCIUM 500 + D) 500 mg(1,250mg) -200 unit per tablet, Take 1 tablet by mouth 2 (two) times daily., Disp: , Rfl:     collagenase (SANTYL) ointment, Apply topically once daily. Apply to wound daily as directed., Disp: 90 g, Rfl: 0    esomeprazole (NEXIUM) 20 MG capsule, Take 1 capsule (20 mg total) by mouth before breakfast. (Patient taking differently: Take 40 mg by mouth before breakfast. Daily as needed), Disp: 90 capsule, Rfl: 3    LACTOSE-REDUCED FOOD (ENSURE ORAL), Take 4 oz by mouth once daily., Disp: , Rfl:     mirtazapine (REMERON) 7.5 MG Tab, Take 1 tablet (7.5 mg total) by mouth every evening., Disp: 30 tablet, Rfl: 11    multivitamin (ONE DAILY MULTIVITAMIN) per tablet, Take 1 tablet by mouth once daily., Disp: , Rfl:     mupirocin (BACTROBAN) 2 % ointment, , Disp: , Rfl:     ondansetron (ZOFRAN-ODT) 4 MG TbDL, Take 1 tablet (4 mg total) by mouth every 12 (twelve) hours as needed., Disp: 30 tablet, Rfl: 2    polymyxin B sulf-trimethoprim (POLYTRIM) 10,000 unit- 1 mg/mL Drop, Place 1 drop into the left eye 3 (three) times daily. , Disp: , Rfl:      senna-docusate 8.6-50 mg (PERICOLACE) 8.6-50 mg per tablet, Take 1 tablet by mouth 2 (two) times daily., Disp: , Rfl:     simvastatin (ZOCOR) 40 MG tablet, TAKE ONE TABLET BY MOUTH ONCE DAILY IN THE EVENING, Disp: 90 tablet, Rfl: 3    traMADol (ULTRAM) 50 mg tablet, Take 1 tablet (50 mg total) by mouth every 6 (six) hours as needed for Pain., Disp: 40 tablet, Rfl: 0    traMADol (ULTRAM) 50 mg tablet, TAKE 1 TABLET BY MOUTH EVERY DAY, Disp: 90 tablet, Rfl: 0    ZINC SULFATE (ZINC-220 ORAL), Take 1 tablet by mouth once daily., Disp: , Rfl:     acetaminophen (TYLENOL) 500 MG tablet, Take 2 tablets (1,000 mg total) by mouth every 6 (six) hours., Disp: , Rfl: 0    nitroGLYCERIN (NITROSTAT) 0.4 MG SL tablet, Place 1 tablet (0.4 mg total) under the tongue every 5 (five) minutes as needed for Chest pain. Every day PRN, Disp: 100 tablet, Rfl: 5    polyethylene glycol (GLYCOLAX) 17 gram PwPk, Take 17 g by mouth once daily., Disp: , Rfl: 0    REVIEW OF SYSTEMS:  General: negative;   ENT: negative;   Allergy and Immunology: negative;   Hematological and Lymphatic: Negative;   Endocrine: negative;   Respiratory: no cough, shortness of breath, or wheezing;   Cardiovascular: no chest pain or dyspnea on exertion;   Gastrointestinal: no abdominal pain/back, change in bowel habits, or bloody stools; Genito-Urinary: no dysuria, trouble voiding, or hematuria;   Musculoskeletal: negative  Neurological: no TIA or stroke symptoms;   Psychiatric: no nervousness, anxiety or depression.    PHYSICAL EXAM:   Right Arm BP - Sittin/73 (18 1040)  Left Arm BP - Sittin/71 (18 1040)  Pulse: 67  Temp: 97.9 °F (36.6 °C)      General appearance:  Alert, well-appearing, and in no distress.  Oriented to person, place, and time   Neurological:  Normal speech, no focal findings noted; CN II - XII grossly intact           Musculoskeletal: Digits/nail without cyanosis/clubbing.  Normal muscle strength/tone.                  Neck: Supple, no significant adenopathy; thyroid is not enlarged                Chest:  Inspiratory/expiratory wheezing, + coughing             Cardiac: Normal rate and regular rhythm, S1 and S2 normal; PMI non-displaced          Abdomen: Soft, nontender, nondistended, no masses or organomegaly      Extremities:   2+ femoral pulses bilaterally      Right DP/PT biphasic signal. Left DP/PT weak biphasic signal.      Left heel 4x4cm pressure ulcer with chronic fibrinous cap over majority of the wound and some granulation tissues on the edges.      Punctate left foot dorsal ulcer with exposed extensor Hallucis tendon, no erythema or drainage.    LAB RESULTS:  Lab Results   Component Value Date    K 3.9 02/07/2018    K 3.6 12/13/2017    K 4.0 10/29/2017    CREATININE 0.8 02/07/2018    CREATININE 0.8 12/13/2017    CREATININE 0.8 10/29/2017     Lab Results   Component Value Date    WBC 9.85 02/07/2018    WBC 8.25 12/13/2017    WBC 8.47 10/30/2017    HCT 38.3 02/07/2018    HCT 39.9 12/13/2017    HCT 25.0 (L) 10/30/2017     02/07/2018     12/13/2017     10/30/2017     No results found for: HGBA1C    IMAGING:  Results:  Lower Extremities Segmental Pressure [mmHg]:                    Right             Left  _______________________________________________________________  Brachial          139               122  Calf              123               98  Posterior Tibial  120               88  Dorsalis Pedis    105               78  SANTA (Post. Tib.)  0.86              0.63  SANTA (Dors. Ped.)  0.76              0.56    Report Summary:  Impression:   Rt SANTA (0.86) Segment/Brachial Index and PVR waveforms indicate mild peripheral arterial obstructive disease.    Lt SANTA (0.56) Segment/Brachial Index and PVR waveforms indicate moderate peripheral arterial obstructive disease.    IMP/PLAN:  89 y.o. female with tobacco abuse and PVD who unfortunately had left hip fracture in 10/'17 and underwent IMN. During her  hospitalization, she developed left heel ulcer for which she has been undergoing local wound care and was evaluated by podiatrist. Left heel wound is very slow to heal due to her underlying PVD.     - Left leg angiogram via right CFA approach 02/21/'18 in OR 11  - local wound care to left heel per woundcare nurse.    Braxton Barragan MD  Vascular/Endovascular Surgery Fellow    I have personally taken the history and examined this patient and agree with the resident's note as stated above    LISA De Leon III, MD, FACS  Professor and Chief, Vascular and Endovascular Surgery

## 2018-02-20 ENCOUNTER — TELEPHONE (OUTPATIENT)
Dept: VASCULAR SURGERY | Facility: CLINIC | Age: 83
End: 2018-02-20

## 2018-02-20 RX ORDER — MAGNESIUM 200 MG
1 TABLET ORAL EVERY MORNING
COMMUNITY

## 2018-02-20 RX ORDER — LANOLIN ALCOHOL/MO/W.PET/CERES
100 CREAM (GRAM) TOPICAL EVERY MORNING
COMMUNITY

## 2018-02-20 NOTE — PRE-PROCEDURE INSTRUCTIONS
"Spoke with Patient's Daugther - Brandi.  NPO, medication, and pre-op instructions reviewed.  Arrival time 1130.  Instructed that she can eat and drink until 0330, then have clear liquids between 0330 - 1000, and then to remain NPO after 1000.  Daughter stated that she has trouble swallowing and has a "poor appetite." Has a Wound Care appointment at 0800 and then will check in at [facility].  Denies previous problems with Anesthesia.  Has Home Health for left heel ulcer dressing changes.  Explained post-procedure activity restriction while here.  Asking if we can elevate her right heel to avoid skin breakdown.  Stated that she uses a walker, bedside commode, and a wheelchair.  Daughter verbalized understanding of instructions.    "

## 2018-02-21 ENCOUNTER — ANESTHESIA (OUTPATIENT)
Dept: SURGERY | Facility: HOSPITAL | Age: 83
End: 2018-02-21
Payer: MEDICARE

## 2018-02-21 ENCOUNTER — SURGERY (OUTPATIENT)
Age: 83
End: 2018-02-21

## 2018-02-21 ENCOUNTER — ANESTHESIA EVENT (OUTPATIENT)
Dept: SURGERY | Facility: HOSPITAL | Age: 83
End: 2018-02-21
Payer: MEDICARE

## 2018-02-21 ENCOUNTER — HOSPITAL ENCOUNTER (OUTPATIENT)
Facility: HOSPITAL | Age: 83
Discharge: HOME | End: 2018-02-21
Attending: SURGERY | Admitting: SURGERY
Payer: MEDICARE

## 2018-02-21 ENCOUNTER — OFFICE VISIT (OUTPATIENT)
Dept: WOUND CARE | Facility: CLINIC | Age: 83
End: 2018-02-21
Payer: MEDICARE

## 2018-02-21 VITALS
OXYGEN SATURATION: 97 % | HEIGHT: 59 IN | HEART RATE: 62 BPM | WEIGHT: 83 LBS | DIASTOLIC BLOOD PRESSURE: 58 MMHG | SYSTOLIC BLOOD PRESSURE: 97 MMHG | BODY MASS INDEX: 16.73 KG/M2 | RESPIRATION RATE: 18 BRPM | TEMPERATURE: 99 F

## 2018-02-21 VITALS
SYSTOLIC BLOOD PRESSURE: 120 MMHG | WEIGHT: 83.56 LBS | BODY MASS INDEX: 16.84 KG/M2 | TEMPERATURE: 98 F | HEIGHT: 59 IN | DIASTOLIC BLOOD PRESSURE: 66 MMHG | HEART RATE: 76 BPM

## 2018-02-21 DIAGNOSIS — I70.209 ATHEROSCLEROTIC PVD WITH ULCERATION: ICD-10-CM

## 2018-02-21 DIAGNOSIS — L97.523 SKIN ULCER OF LEFT FOOT WITH NECROSIS OF MUSCLE: Primary | ICD-10-CM

## 2018-02-21 DIAGNOSIS — L98.499 ATHEROSCLEROTIC PVD WITH ULCERATION: ICD-10-CM

## 2018-02-21 DIAGNOSIS — L89.620 DECUBITUS ULCER OF LEFT HEEL, UNSTAGEABLE: ICD-10-CM

## 2018-02-21 PROCEDURE — C1874 STENT, COATED/COV W/DEL SYS: HCPCS | Performed by: SURGERY

## 2018-02-21 PROCEDURE — 75710 ARTERY X-RAYS ARM/LEG: CPT | Mod: 26,59,GC, | Performed by: SURGERY

## 2018-02-21 PROCEDURE — 71000044 HC DOSC ROUTINE RECOVERY FIRST HOUR: Performed by: SURGERY

## 2018-02-21 PROCEDURE — 25500020 PHARM REV CODE 255: Performed by: SURGERY

## 2018-02-21 PROCEDURE — D9220A PRA ANESTHESIA: Mod: CRNA,,, | Performed by: NURSE ANESTHETIST, CERTIFIED REGISTERED

## 2018-02-21 PROCEDURE — 11042 DBRDMT SUBQ TIS 1ST 20SQCM/<: CPT | Mod: S$PBB,,, | Performed by: NURSE PRACTITIONER

## 2018-02-21 PROCEDURE — 71000015 HC POSTOP RECOV 1ST HR: Performed by: SURGERY

## 2018-02-21 PROCEDURE — D9220A PRA ANESTHESIA: Mod: ANES,,, | Performed by: ANESTHESIOLOGY

## 2018-02-21 PROCEDURE — 25000003 PHARM REV CODE 250: Performed by: SURGERY

## 2018-02-21 PROCEDURE — 27201423 OPTIME MED/SURG SUP & DEVICES STERILE SUPPLY: Performed by: SURGERY

## 2018-02-21 PROCEDURE — 63600175 PHARM REV CODE 636 W HCPCS: Performed by: SURGERY

## 2018-02-21 PROCEDURE — 25000003 PHARM REV CODE 250: Performed by: NURSE ANESTHETIST, CERTIFIED REGISTERED

## 2018-02-21 PROCEDURE — 37000008 HC ANESTHESIA 1ST 15 MINUTES: Performed by: SURGERY

## 2018-02-21 PROCEDURE — C1725 CATH, TRANSLUMIN NON-LASER: HCPCS | Performed by: SURGERY

## 2018-02-21 PROCEDURE — C1887 CATHETER, GUIDING: HCPCS | Performed by: SURGERY

## 2018-02-21 PROCEDURE — 37228 PR TIB/PER REVASC W/TLA: CPT | Mod: 79,LT,GC, | Performed by: SURGERY

## 2018-02-21 PROCEDURE — 71000016 HC POSTOP RECOV ADDL HR: Performed by: SURGERY

## 2018-02-21 PROCEDURE — 37226 PR FEM/POPL REVASC W/STENT: CPT | Mod: 79,51,LT,GC | Performed by: SURGERY

## 2018-02-21 PROCEDURE — 99215 OFFICE O/P EST HI 40 MIN: CPT | Mod: PBBFAC,25 | Performed by: NURSE PRACTITIONER

## 2018-02-21 PROCEDURE — 71000045 HC DOSC ROUTINE RECOVERY EA ADD'L HR: Performed by: SURGERY

## 2018-02-21 PROCEDURE — 36000707: Performed by: SURGERY

## 2018-02-21 PROCEDURE — C1769 GUIDE WIRE: HCPCS | Performed by: SURGERY

## 2018-02-21 PROCEDURE — C1894 INTRO/SHEATH, NON-LASER: HCPCS | Performed by: SURGERY

## 2018-02-21 PROCEDURE — 37000009 HC ANESTHESIA EA ADD 15 MINS: Performed by: SURGERY

## 2018-02-21 PROCEDURE — 11042 DBRDMT SUBQ TIS 1ST 20SQCM/<: CPT | Mod: PBBFAC,59 | Performed by: NURSE PRACTITIONER

## 2018-02-21 PROCEDURE — 99999 PR PBB SHADOW E&M-EST. PATIENT-LVL V: CPT | Mod: PBBFAC,,, | Performed by: NURSE PRACTITIONER

## 2018-02-21 PROCEDURE — 1159F MED LIST DOCD IN RCRD: CPT | Mod: ,,, | Performed by: NURSE PRACTITIONER

## 2018-02-21 PROCEDURE — 63600175 PHARM REV CODE 636 W HCPCS: Performed by: NURSE ANESTHETIST, CERTIFIED REGISTERED

## 2018-02-21 PROCEDURE — 1125F AMNT PAIN NOTED PAIN PRSNT: CPT | Mod: ,,, | Performed by: NURSE PRACTITIONER

## 2018-02-21 PROCEDURE — 99212 OFFICE O/P EST SF 10 MIN: CPT | Mod: 25,S$PBB,, | Performed by: NURSE PRACTITIONER

## 2018-02-21 PROCEDURE — 36000706: Performed by: SURGERY

## 2018-02-21 PROCEDURE — C1760 CLOSURE DEV, VASC: HCPCS | Performed by: SURGERY

## 2018-02-21 DEVICE — IMPLANTABLE DEVICE: Type: IMPLANTABLE DEVICE | Site: OTHER (ADD COMMENT) | Status: FUNCTIONAL

## 2018-02-21 RX ORDER — SODIUM CHLORIDE 0.9 % (FLUSH) 0.9 %
3 SYRINGE (ML) INJECTION
Status: DISCONTINUED | OUTPATIENT
Start: 2018-02-21 | End: 2018-02-21 | Stop reason: HOSPADM

## 2018-02-21 RX ORDER — MUPIROCIN 20 MG/G
OINTMENT TOPICAL
Status: DISCONTINUED | OUTPATIENT
Start: 2018-02-21 | End: 2018-02-21 | Stop reason: HOSPADM

## 2018-02-21 RX ORDER — HYDROCODONE BITARTRATE AND ACETAMINOPHEN 5; 325 MG/1; MG/1
1 TABLET ORAL EVERY 4 HOURS PRN
Status: DISCONTINUED | OUTPATIENT
Start: 2018-02-21 | End: 2018-02-21 | Stop reason: HOSPADM

## 2018-02-21 RX ORDER — CLOPIDOGREL BISULFATE 75 MG/1
150 TABLET ORAL ONCE
Status: COMPLETED | OUTPATIENT
Start: 2018-02-21 | End: 2018-02-21

## 2018-02-21 RX ORDER — ETOMIDATE 2 MG/ML
INJECTION INTRAVENOUS
Status: DISCONTINUED | OUTPATIENT
Start: 2018-02-21 | End: 2018-02-21

## 2018-02-21 RX ORDER — LIDOCAINE HYDROCHLORIDE 10 MG/ML
1 INJECTION, SOLUTION EPIDURAL; INFILTRATION; INTRACAUDAL; PERINEURAL ONCE
Status: COMPLETED | OUTPATIENT
Start: 2018-02-21 | End: 2018-02-21

## 2018-02-21 RX ORDER — FENTANYL CITRATE 50 UG/ML
INJECTION, SOLUTION INTRAMUSCULAR; INTRAVENOUS
Status: DISCONTINUED | OUTPATIENT
Start: 2018-02-21 | End: 2018-02-21

## 2018-02-21 RX ORDER — SODIUM CHLORIDE 9 MG/ML
INJECTION, SOLUTION INTRAVENOUS CONTINUOUS
Status: DISCONTINUED | OUTPATIENT
Start: 2018-02-21 | End: 2018-02-21 | Stop reason: HOSPADM

## 2018-02-21 RX ORDER — HEPARIN SODIUM 1000 [USP'U]/ML
INJECTION, SOLUTION INTRAVENOUS; SUBCUTANEOUS
Status: DISCONTINUED | OUTPATIENT
Start: 2018-02-21 | End: 2018-02-21

## 2018-02-21 RX ORDER — IODIXANOL 320 MG/ML
INJECTION, SOLUTION INTRAVASCULAR
Status: DISCONTINUED | OUTPATIENT
Start: 2018-02-21 | End: 2018-02-21 | Stop reason: HOSPADM

## 2018-02-21 RX ORDER — CLOPIDOGREL BISULFATE 75 MG/1
75 TABLET ORAL DAILY
Qty: 30 TABLET | Refills: 11 | Status: SHIPPED | OUTPATIENT
Start: 2018-02-21 | End: 2019-02-21

## 2018-02-21 RX ORDER — PHENYLEPHRINE HYDROCHLORIDE 10 MG/ML
INJECTION INTRAVENOUS
Status: DISCONTINUED | OUTPATIENT
Start: 2018-02-21 | End: 2018-02-21

## 2018-02-21 RX ORDER — PROTAMINE SULFATE 10 MG/ML
INJECTION, SOLUTION INTRAVENOUS
Status: DISCONTINUED | OUTPATIENT
Start: 2018-02-21 | End: 2018-02-21

## 2018-02-21 RX ORDER — HEPARIN SODIUM 1000 [USP'U]/ML
INJECTION, SOLUTION INTRAVENOUS; SUBCUTANEOUS
Status: DISCONTINUED | OUTPATIENT
Start: 2018-02-21 | End: 2018-02-21 | Stop reason: HOSPADM

## 2018-02-21 RX ORDER — FENTANYL CITRATE 50 UG/ML
25 INJECTION, SOLUTION INTRAMUSCULAR; INTRAVENOUS EVERY 5 MIN PRN
Status: DISCONTINUED | OUTPATIENT
Start: 2018-02-21 | End: 2018-02-21 | Stop reason: HOSPADM

## 2018-02-21 RX ORDER — CEFAZOLIN SODIUM 1 G/3ML
2 INJECTION, POWDER, FOR SOLUTION INTRAMUSCULAR; INTRAVENOUS
Status: COMPLETED | OUTPATIENT
Start: 2018-02-21 | End: 2018-02-21

## 2018-02-21 RX ORDER — DIPHENHYDRAMINE HYDROCHLORIDE 50 MG/ML
INJECTION INTRAMUSCULAR; INTRAVENOUS
Status: DISCONTINUED | OUTPATIENT
Start: 2018-02-21 | End: 2018-02-21

## 2018-02-21 RX ORDER — LIDOCAINE HYDROCHLORIDE 10 MG/ML
INJECTION, SOLUTION EPIDURAL; INFILTRATION; INTRACAUDAL; PERINEURAL
Status: DISCONTINUED | OUTPATIENT
Start: 2018-02-21 | End: 2018-02-21 | Stop reason: HOSPADM

## 2018-02-21 RX ORDER — LIDOCAINE HCL/PF 100 MG/5ML
SYRINGE (ML) INTRAVENOUS
Status: DISCONTINUED | OUTPATIENT
Start: 2018-02-21 | End: 2018-02-21

## 2018-02-21 RX ORDER — DEXMEDETOMIDINE HYDROCHLORIDE 100 UG/ML
INJECTION, SOLUTION INTRAVENOUS
Status: DISCONTINUED | OUTPATIENT
Start: 2018-02-21 | End: 2018-02-21

## 2018-02-21 RX ADMIN — PHENYLEPHRINE HYDROCHLORIDE 50 MCG: 10 INJECTION INTRAVENOUS at 01:02

## 2018-02-21 RX ADMIN — DEXMEDETOMIDINE HYDROCHLORIDE 10 MCG: 100 INJECTION, SOLUTION, CONCENTRATE INTRAVENOUS at 01:02

## 2018-02-21 RX ADMIN — LIDOCAINE HYDROCHLORIDE 20 MG: 20 INJECTION, SOLUTION INTRAVENOUS at 01:02

## 2018-02-21 RX ADMIN — HEPARIN SODIUM 10000 UNITS: 1000 INJECTION, SOLUTION INTRAVENOUS; SUBCUTANEOUS at 01:02

## 2018-02-21 RX ADMIN — ETOMIDATE 2 MG: 2 INJECTION, SOLUTION INTRAVENOUS at 02:02

## 2018-02-21 RX ADMIN — FENTANYL CITRATE 5 MCG: 50 INJECTION, SOLUTION INTRAMUSCULAR; INTRAVENOUS at 02:02

## 2018-02-21 RX ADMIN — MUPIROCIN: 20 OINTMENT TOPICAL at 11:02

## 2018-02-21 RX ADMIN — SODIUM CHLORIDE: 0.9 INJECTION, SOLUTION INTRAVENOUS at 11:02

## 2018-02-21 RX ADMIN — LIDOCAINE HYDROCHLORIDE 17 ML: 10 INJECTION, SOLUTION EPIDURAL; INFILTRATION; INTRACAUDAL; PERINEURAL at 02:02

## 2018-02-21 RX ADMIN — ETOMIDATE 4 MG: 2 INJECTION, SOLUTION INTRAVENOUS at 02:02

## 2018-02-21 RX ADMIN — DIPHENHYDRAMINE HYDROCHLORIDE 10 MG: 50 INJECTION, SOLUTION INTRAMUSCULAR; INTRAVENOUS at 01:02

## 2018-02-21 RX ADMIN — PROTAMINE SULFATE 30 MG: 10 INJECTION, SOLUTION INTRAVENOUS at 02:02

## 2018-02-21 RX ADMIN — HEPARIN SODIUM 3500 UNITS: 1000 INJECTION, SOLUTION INTRAVENOUS; SUBCUTANEOUS at 01:02

## 2018-02-21 RX ADMIN — ETOMIDATE 4 MG: 2 INJECTION, SOLUTION INTRAVENOUS at 01:02

## 2018-02-21 RX ADMIN — DEXMEDETOMIDINE HYDROCHLORIDE 10 MCG: 100 INJECTION, SOLUTION, CONCENTRATE INTRAVENOUS at 02:02

## 2018-02-21 RX ADMIN — FENTANYL CITRATE 5 MCG: 50 INJECTION, SOLUTION INTRAMUSCULAR; INTRAVENOUS at 01:02

## 2018-02-21 RX ADMIN — CEFAZOLIN 1 G: 330 INJECTION, POWDER, FOR SOLUTION INTRAMUSCULAR; INTRAVENOUS at 01:02

## 2018-02-21 RX ADMIN — IODIXANOL 34 ML: 320 INJECTION, SOLUTION INTRAVASCULAR at 02:02

## 2018-02-21 RX ADMIN — CLOPIDOGREL 150 MG: 75 TABLET, FILM COATED ORAL at 07:02

## 2018-02-21 RX ADMIN — LIDOCAINE HYDROCHLORIDE 0.2 MG: 10 INJECTION, SOLUTION EPIDURAL; INFILTRATION; INTRACAUDAL; PERINEURAL at 11:02

## 2018-02-21 RX ADMIN — FENTANYL CITRATE 10 MCG: 50 INJECTION, SOLUTION INTRAMUSCULAR; INTRAVENOUS at 01:02

## 2018-02-21 RX ADMIN — PHENYLEPHRINE HYDROCHLORIDE 50 MCG: 10 INJECTION INTRAVENOUS at 02:02

## 2018-02-21 NOTE — DISCHARGE INSTRUCTIONS
Peripheral Angiography  Peripheral angiography is an outpatient procedure that makes a map of the vessels (arteries) in your lower body, legs, and arms, using X-ray and dye.This map can show where blood flow may be blocked.  Talk with your healthcare provider about the risks and complications of angiography.   Before the procedure  Prepare for the peripheral angiography as follows:   · Tell your healthcare provider about all medicines you take and any allergies you may have.  · Follow any directions youre given for not eating or drinking before the procedure. If your provider says to take your normal medicines, swallow them with only small sips of water.  · Arrange for a family member or friend to drive you home.  During the procedure  Here is what to expect:  ·   You may get medicine through an IV (intravenous) line to relax you. Youre given an injection to numb the insertion site. Then, a tiny skin cut (incision) is made near an artery in your groin.  · Your provider inserts a thin tube (catheter) through the incision. He or she then threads the catheter into an artery while looking at a video monitor.  · Contrast dye is injected into the catheter to confirm position. You may feel warmth or pressure in your legs and back. You lie still as X-rays are taken. The catheter is then taken out.  After the procedure  Youll be taken to a recovery area. A healthcare provider will apply pressure to the site for about 10 minutes. Your healthcare provider will tell you how long to lie down and keep the insertion site still. Your healthcare provider will discuss the results with you soon after the procedure.  Back at home  On the day you get home, dont drive, dont exercise, avoid walking and taking stairs, and avoid bending and lifting. Your healthcare provider may give you other care instructions.  Call your healthcare provider  Call your healthcare provider right away if:  · You notice a lump or bleeding at the  insertion site  · You feel pain at the insertion site  · You become lightheaded or dizzy  · You have leg pain or numbness  · You do not urinate in 8 hours    Date Last Reviewed: 5/1/2016 © 2000-2017 [employer]. [address] [place] [id number]. All rights reserved. This information is not intended as a substitute for professional medical care. Always follow your healthcare professional's instructions.      Recovery After Procedural Sedation (Adult)  You have been given medicine by vein to make you sleep during your surgery. This may have included both a pain medicine and sleeping medicine. Most of the effects have worn off. But you may still have some drowsiness for the next 6 to 8 hours.  Home care  Follow these guidelines when you get home:  · For the next 8 hours, you should be watched by a responsible adult. This person should make sure your condition is not getting worse.  · Don't drink any alcohol for the next 24 hours.  · Don't drive, operate dangerous machinery, or make important business or personal decisions during the next 24 hours.  Note: Your healthcare provider may tell you not to take any medicine by mouth for pain or sleep in the next 4 hours. These medicines may react with the medicines you were given in the hospital. This could cause a much stronger response than usual.  Follow-up care  Follow up with your healthcare provider if you are not alert and back to your usual level of activity within 12 hours.  When to seek medical advice  Call your healthcare provider right away if any of these occur:  · Drowsiness gets worse  · Weakness or dizziness gets worse  · Repeated vomiting  · You can't be awakened   Date Last Reviewed: 10/18/2016  © 7409-4761 [employer]. [address] [place] [id number]. All rights reserved. This information is not intended as a substitute for professional medical care. Always follow your healthcare professional's  instructions.

## 2018-02-21 NOTE — TRANSFER OF CARE
"Anesthesia Transfer of Care Note    Patient: Simona Bassett    Procedure(s) Performed: Procedure(s) (LRB):  Angiogram left leg (right CFA approach) (Left)    Patient location: [facility]    Anesthesia Type: general    Transport from OR: Transported from OR on 2-3 L/min O2 by NC with adequate spontaneous ventilation    Post pain: adequate analgesia    Post assessment: no apparent anesthetic complications and tolerated procedure well    Post vital signs: stable    Level of consciousness: sedated    Nausea/Vomiting: no nausea/vomiting    Complications: none    Transfer of care protocol was followed      Last vitals:   Visit Vitals  BP (!) 121/55   Pulse 62   Temp 36.4 °C (97.5 °F) (Oral)   Resp 16   Ht 4' 11" (1.499 m)   Wt 37.6 kg (83 lb)   SpO2 (!) 93%   Breastfeeding? No   BMI 16.76 kg/m²     "

## 2018-02-21 NOTE — ANESTHESIA PREPROCEDURE EVALUATION
02/21/2018  Simona Bassett is a 89 y.o., female     Patient Active Problem List   Diagnosis    Mixed hyperlipidemia    Osteoporosis, post-menopausal    Tobacco abuse    Thoracic compression fracture    Esophageal dysmotility    Gastroesophageal reflux disease without esophagitis    Constipation due to pain medication    Chronic pain    Closed displaced intertrochanteric fracture of left femur with routine healing s/p IM tayo on 10/26/2017    Poor appetite    Closed fracture of base of fifth metatarsal bone    Nausea    Eschar of heel    Peripheral vascular disease, unspecified    Skin ulcer of left foot with necrosis of muscle    Decubitus ulcer of left heel, unstageable    Atherosclerotic PVD with ulceration       Anesthesia Evaluation    I have reviewed the Patient Summary Reports.    I have reviewed the Nursing Notes.   I have reviewed the Medications.     Review of Systems  Anesthesia Hx:  Hx of Anesthetic complications PONV   Cardiovascular:  Cardiovascular Normal     Pulmonary:  Pulmonary Normal    Hepatic/GI:   GERD    Endocrine:  Endocrine Normal        Physical Exam   Airway/Jaw/Neck:  Airway Findings: Mouth Opening: Normal Tongue: Normal  General Airway Assessment: Adult  Mallampati: III  Improves to II with phonation.  TM Distance: Normal, at least 6 cm      Dental:  Dental Findings: Upper Dentures, loose tooth    Chest/Lungs:  Chest/Lungs Findings: Clear to auscultation, Normal Respiratory Rate     Heart/Vascular:  Heart Findings: Rate: Normal  Rhythm: Regular Rhythm             Anesthesia Plan  Type of Anesthesia, risks & benefits discussed:  Anesthesia Type:  MAC  Patient's Preference:   Intra-op Monitoring Plan: standard ASA monitors  Intra-op Monitoring Plan Comments:   Post Op Pain Control Plan: IV/PO Opioids PRN  Post Op Pain Control Plan Comments:   Induction:    Beta  Blocker:  Patient is not currently on a Beta-Blocker (No further documentation required).       Informed Consent: Patient understands risks and agrees with Anesthesia plan.  Questions answered. Anesthesia consent signed with patient.  ASA Score: 3     Day of Surgery Review of History & Physical:            Ready For Surgery From Anesthesia Perspective.

## 2018-02-21 NOTE — PLAN OF CARE
Patient and daughter state they are ready to be discharged. Instructions given to patient and family. Both verbalize understanding. Patient tolerating po liquids with no difficulty. Patient denies pain. Anesthesia consent and surgical consent in chart upon patient's discharge from [facility].

## 2018-02-21 NOTE — PATIENT INSTRUCTIONS
Cleanse wounds with wound cleanser.  Santyl to left foot and heel ulcers daily, cover with gauze and secure with roll gauze.  Darco shoe left foot.

## 2018-02-21 NOTE — PROGRESS NOTES
Subjective:       Patient ID: Simona Bassett is a 89 y.o. female.    Chief Complaint: Wound Check    HPI   This patient is seen today for reevaluation of ulcers to the left dorsal foot and heel.  She broke the left foot in early October 2017 and was given some type of a boot to wear.  This rubbed and caused the wound on the top of the foot.  She then broke the leg in late October and this is when she developed an ulcer to the left heel.  She has home health through [facility] twice weekly.  She has been using santyl daily on both wounds and they are healing as evidenced by wound contracture.  She is afebrile.  She denies increased redness, swelling or purulent drainage.  Her pain level is 4/10.  She also has hypoalbuminemia secondary to protein calorie malnutrition. Her appetite is poor and she is drinking one to two boost daily.  She is also smoking 1/2 pack of cigarettes daily.  Review of Systems   Constitutional: Negative for chills, diaphoresis and fever.   HENT: Positive for hearing loss and trouble swallowing. Negative for postnasal drip, rhinorrhea, sinus pressure, sneezing, sore throat and tinnitus.    Eyes: Positive for visual disturbance.   Respiratory: Positive for cough and shortness of breath. Negative for apnea and wheezing.    Cardiovascular: Negative for chest pain, palpitations and leg swelling.   Gastrointestinal: Positive for constipation. Negative for diarrhea, nausea and vomiting.   Genitourinary: Negative for difficulty urinating, dysuria, frequency and hematuria.   Musculoskeletal: Positive for back pain. Negative for arthralgias and joint swelling.   Skin: Positive for wound.   Neurological: Negative for dizziness, weakness, light-headedness and headaches.   Hematological: Bruises/bleeds easily.   Psychiatric/Behavioral: Negative for confusion, decreased concentration, dysphoric mood and sleep disturbance. The patient is nervous/anxious.        Objective:      Physical Exam   Constitutional:  She is oriented to person, place, and time. She appears well-developed and well-nourished. No distress.   HENT:   Head: Normocephalic and atraumatic.   Musculoskeletal: She exhibits no edema or tenderness.        Thoracic back: She exhibits deformity (kyphosis).        Feet:    Neurological: She is alert and oriented to person, place, and time.   Skin: Skin is warm and dry. No rash noted. She is not diaphoretic. No erythema.   Psychiatric: She has a normal mood and affect. Her behavior is normal. Judgment and thought content normal.   Nursing note and vitals reviewed.        Lab Results   Component Value Date    ALBUMIN 2.7 (L) 02/07/2018     Procedure: Simona was seen in the clinic room and placed in the supine position on the treatment table. Attention was directed to the ulcer which was located on the left heel, where an wound measuring 2.4x2.7x0.4 cm is noted. The wound was covered with 100% necrotic tissue. No acute signs of infection were noted. The wound was non-tender. The wound was prepped with alcohol. Utilizing a #15 scalpel and pickups, I debrided the wound full-thickness through skin and subcutaneous tissue to excise viable and nonviable tissue inside the wound margins. The patient tolerated well. Because of a lack of sensation, anesthesia was not necessary. The wound was flushed with wound . There was minimal bleeding with debridement which was well controlled with direct pressure. The wound was then dressed with medihoney gel. The patient tolerated the procedure well.  Assessment:       1. Skin ulcer of left foot with necrosis of muscle    2. Decubitus ulcer of left heel, unstageable        Plan:           Cleanse wounds with wound cleanser.  Santyl to left foot and heel ulcers daily, cover with gauze and secure with roll gauze.  Darco shoe left foot.  Return to clinic in 2 weeks.    Unstageable left heel decubitus        Stage IV left dorsal foot ulcer

## 2018-02-21 NOTE — BRIEF OP NOTE
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 PTA, L tibio-peroneal trunk (3x20)  2. Stent placement, L SFA (5x60 [place])  3. L LE angiogram  4. US  Guided R CFA access    Anesthesia: Local MAC    Description of the findings of the procedure: 5 fr R CFA/mynx closure; 14.3 min fluoro;130 mGy; 34 cc visipaque    Findings/Key Components: >95% SFA stenosis x 2 (<20% after stent), and 95% terminal pop/TP stenosis, ~20% residual after PTA    Estimated Blood Loss: <3cc         Specimens:   Specimen (12h ago through future)    None          Discharge Note    SUMMARY     Admit Date: 2/21/2018    Discharge Date and Time: 2/21/18    Hospital Course (synopsis of major diagnoses, care, treatment, and services provided during the course of the hospital stay): successful outpatient procedure     Final Diagnosis: Post-Op Diagnosis Codes:     * 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

## 2018-02-21 NOTE — H&P (VIEW-ONLY)
Simona Bassett  02/09/2018    HPI:  Patient is a 89 y.o. female with a long h/o tobacco abuse who had IMN of left femur for intertrochanteric fracture in 10/'17 and subsequently developed left heel pressure ulcer that has not been healing properly and also developed pressure ulcer on dorsum of her left foot from ill fitting boot. She is currently living with her daughter and does have proper heel offloading while in bed and proper home health wound care.     She takes ASA 81 and Statin.    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 prostate    No Known Problems Daughter     No Known Problems Son     No Known Problems Son     No Known Problems Son     No Known Problems Son     No Known Problems Daughter     Colon cancer Neg Hx     Cystic fibrosis Neg Hx     Esophageal cancer Neg Hx     Hemochromatosis Neg Hx     Inflammatory bowel disease Neg Hx     Liver cancer Neg Hx     Liver disease Neg Hx     Rectal cancer Neg  Hx     Stomach cancer Neg Hx     Derrick's disease Neg Hx     Celiac disease Neg Hx      Social History     Social History    Marital status:      Spouse name: N/A    Number of children: 6    Years of education: N/A     Occupational History    Not on file.     Social History Main Topics    Smoking status: Current Every Day Smoker     Packs/day: 1.00     Years: 60.00    Smokeless tobacco: Never Used    Alcohol use No    Drug use: No    Sexual activity: Not on file     Other Topics Concern    Not on file     Social History Narrative    No narrative on file     Review of patient's allergies indicates:  No Known Allergies    Current Outpatient Prescriptions:     ascorbic acid, vitamin C, (VITAMIN C) 250 MG tablet, Take 250 mg by mouth once daily., Disp: , Rfl:     aspirin 81 mg Tab, Every day, Disp: , Rfl:     calcium-vitamin D3 (CALCIUM 500 + D) 500 mg(1,250mg) -200 unit per tablet, Take 1 tablet by mouth 2 (two) times daily., Disp: , Rfl:     collagenase (SANTYL) ointment, Apply topically once daily. Apply to wound daily as directed., Disp: 90 g, Rfl: 0    esomeprazole (NEXIUM) 20 MG capsule, Take 1 capsule (20 mg total) by mouth before breakfast. (Patient taking differently: Take 40 mg by mouth before breakfast. Daily as needed), Disp: 90 capsule, Rfl: 3    LACTOSE-REDUCED FOOD (ENSURE ORAL), Take 4 oz by mouth once daily., Disp: , Rfl:     mirtazapine (REMERON) 7.5 MG Tab, Take 1 tablet (7.5 mg total) by mouth every evening., Disp: 30 tablet, Rfl: 11    multivitamin (ONE DAILY MULTIVITAMIN) per tablet, Take 1 tablet by mouth once daily., Disp: , Rfl:     mupirocin (BACTROBAN) 2 % ointment, , Disp: , Rfl:     ondansetron (ZOFRAN-ODT) 4 MG TbDL, Take 1 tablet (4 mg total) by mouth every 12 (twelve) hours as needed., Disp: 30 tablet, Rfl: 2    polymyxin B sulf-trimethoprim (POLYTRIM) 10,000 unit- 1 mg/mL Drop, Place 1 drop into the left eye 3 (three) times daily. , Disp: , Rfl:      senna-docusate 8.6-50 mg (PERICOLACE) 8.6-50 mg per tablet, Take 1 tablet by mouth 2 (two) times daily., Disp: , Rfl:     simvastatin (ZOCOR) 40 MG tablet, TAKE ONE TABLET BY MOUTH ONCE DAILY IN THE EVENING, Disp: 90 tablet, Rfl: 3    traMADol (ULTRAM) 50 mg tablet, Take 1 tablet (50 mg total) by mouth every 6 (six) hours as needed for Pain., Disp: 40 tablet, Rfl: 0    traMADol (ULTRAM) 50 mg tablet, TAKE 1 TABLET BY MOUTH EVERY DAY, Disp: 90 tablet, Rfl: 0    ZINC SULFATE (ZINC-220 ORAL), Take 1 tablet by mouth once daily., Disp: , Rfl:     acetaminophen (TYLENOL) 500 MG tablet, Take 2 tablets (1,000 mg total) by mouth every 6 (six) hours., Disp: , Rfl: 0    nitroGLYCERIN (NITROSTAT) 0.4 MG SL tablet, Place 1 tablet (0.4 mg total) under the tongue every 5 (five) minutes as needed for Chest pain. Every day PRN, Disp: 100 tablet, Rfl: 5    polyethylene glycol (GLYCOLAX) 17 gram PwPk, Take 17 g by mouth once daily., Disp: , Rfl: 0    REVIEW OF SYSTEMS:  General: negative;   ENT: negative;   Allergy and Immunology: negative;   Hematological and Lymphatic: Negative;   Endocrine: negative;   Respiratory: no cough, shortness of breath, or wheezing;   Cardiovascular: no chest pain or dyspnea on exertion;   Gastrointestinal: no abdominal pain/back, change in bowel habits, or bloody stools; Genito-Urinary: no dysuria, trouble voiding, or hematuria;   Musculoskeletal: negative  Neurological: no TIA or stroke symptoms;   Psychiatric: no nervousness, anxiety or depression.    PHYSICAL EXAM:   Right Arm BP - Sittin/73 (18 1040)  Left Arm BP - Sittin/71 (18 1040)  Pulse: 67  Temp: 97.9 °F (36.6 °C)      General appearance:  Alert, well-appearing, and in no distress.  Oriented to person, place, and time   Neurological:  Normal speech, no focal findings noted; CN II - XII grossly intact           Musculoskeletal: Digits/nail without cyanosis/clubbing.  Normal muscle strength/tone.                  Neck: Supple, no significant adenopathy; thyroid is not enlarged                Chest:  Inspiratory/expiratory wheezing, + coughing             Cardiac: Normal rate and regular rhythm, S1 and S2 normal; PMI non-displaced          Abdomen: Soft, nontender, nondistended, no masses or organomegaly      Extremities:   2+ femoral pulses bilaterally      Right DP/PT biphasic signal. Left DP/PT weak biphasic signal.      Left heel 4x4cm pressure ulcer with chronic fibrinous cap over majority of the wound and some granulation tissues on the edges.      Punctate left foot dorsal ulcer with exposed extensor Hallucis tendon, no erythema or drainage.    LAB RESULTS:  Lab Results   Component Value Date    K 3.9 02/07/2018    K 3.6 12/13/2017    K 4.0 10/29/2017    CREATININE 0.8 02/07/2018    CREATININE 0.8 12/13/2017    CREATININE 0.8 10/29/2017     Lab Results   Component Value Date    WBC 9.85 02/07/2018    WBC 8.25 12/13/2017    WBC 8.47 10/30/2017    HCT 38.3 02/07/2018    HCT 39.9 12/13/2017    HCT 25.0 (L) 10/30/2017     02/07/2018     12/13/2017     10/30/2017     No results found for: HGBA1C    IMAGING:  Results:  Lower Extremities Segmental Pressure [mmHg]:                    Right             Left  _______________________________________________________________  Brachial          139               122  Calf              123               98  Posterior Tibial  120               88  Dorsalis Pedis    105               78  SANTA (Post. Tib.)  0.86              0.63  SANTA (Dors. Ped.)  0.76              0.56    Report Summary:  Impression:   Rt ASNTA (0.86) Segment/Brachial Index and PVR waveforms indicate mild peripheral arterial obstructive disease.    Lt SANTA (0.56) Segment/Brachial Index and PVR waveforms indicate moderate peripheral arterial obstructive disease.    IMP/PLAN:  89 y.o. female with tobacco abuse and PVD who unfortunately had left hip fracture in 10/'17 and underwent IMN. During her  hospitalization, she developed left heel ulcer for which she has been undergoing local wound care and was evaluated by podiatrist. Left heel wound is very slow to heal due to her underlying PVD.     - Left leg angiogram via right CFA approach 02/21/'18 in OR 11  - local wound care to left heel per woundcare nurse.    Braxton Barragan MD  Vascular/Endovascular Surgery Fellow    I have personally taken the history and examined this patient and agree with the resident's note as stated above    LISA De Leon III, MD, FACS  Professor and Chief, Vascular and Endovascular Surgery

## 2018-02-21 NOTE — OP NOTE
[facility]  Vascular Surgery  Operative Note    SUMMARY     Date of Procedure: 2/21/2018     Procedure: 1. PTA, L tibio-peroneal trunk (3x20)    2. Stent placement, L SFA (5x60 Zilver)    3. L LE angiogram    4. 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 female with tobacco abuse and PVD with recent h/o of L hip fracture with resultant L heel ulcer during hospitalization.      Description of the Findings of the Procedure: >95% SFA stenosis x 2 (<20% after stent), and 95% terminal pop/TP stenosis, ~20% residual after PTA     Complications: No    Estimated Blood Loss (EBL): 5 mL           Implants:   Implant Name Type Inv. Item Serial No.  Lot No. LRB No. Used   zilver [id number] biliary stent       [employer] [id number] Left 1       Specimens:   Specimen (12h ago through future)    None                  Condition: Good    Disposition: PACU - hemodynamically stable.    DATE OF PROCEDURE:  02/21/2018    OPERATION IN DETAIL:  Informed consent was obtained and the patient was brought   into the Operating Room and placed in supine position.  Local monitored   anesthesia care was begun.  The patient was given IV antibiotics.  The patient's   bilateral groins were prepped and draped in a sterile fashion.  The patient's   right common femoral artery was visualized under ultrasound.  This was noted to   be highly diseased, but patent, with a palpable pulse.  This was accessed with a   micropuncture needle, followed by a wire, followed by a  micropuncture sheath.    Sheathogram demonstrated access site to be in the right common femoral artery.    A stiff angled Glidewire was then introduced into the distal aorta over   visualization with advancement of an Omniflush catheter.  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  The patient has patent   bilateral common iliac and external iliac arteries.  The patient was noted to   have very small arteries overall.  A stiff angled Glidewire was then advanced   into the external iliac artery.  Using the Omniflush catheter, we selected the   left side.  Omniflush catheter was advanced into the distal external iliac   artery and left lower extremity angiogram was performed.  This demonstrated   patent left common femoral artery and patent profunda femoris artery.  The   patient had a patent SFA with high-grade greater than 95% stenosis in the   proximal SFA as well as another high-grade stenosis greater than 95% in the mid   SFA.  The patient did have bulky calcific disease throughout her SFA.  Distal   runoff images demonstrated a patent distal SFA with patent popliteal artery with   two-vessel runoff with the PT and peroneal artery at the level of the foot.  A   decision was made to intervene.  The patient was given 3500 units of IV heparin.    A 4 x 45 catheter was advanced up and over the aorta to terminate in the left   common femoral artery.  A 0.014 PT ChoICE extra support wire was advanced across   the SFA and the high-grade stenosis followed by a 3.5 x 30 TREK balloon, which   was advanced first to the proximal high-grade SFA stenosis and inflated to 10   mmHg with resolution of waist noted.  This was then advanced into the mid SFA   stenosis and again inflated with resolution of the waist noted.  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    The mid SFA  stenosis looked slightly improved.  Decision was made to place a   stent across the proximal SFA stenosis.  A 0.035 Glidewire was advanced across   these stenoses with exchange of the sheath for a 5 x 45 cm sheath, again   advanced to terminate at the distal common iliac artery.  The seeker catheter   was then advanced over the wire with wire exchange for an 0.018 Glidewire   Advantage.  A 5 x 60 Zilver was used to cross the entirety of the proximal SFA   dissection.  This was deployed.  Followup angiogram demonstrated successful   coverage of the SFA dissection with no evidence of extravasation or continued   fistula, with brisk antegrade flow through the stent.  The patient remained to   have a moderate to high-grade stenosis of the mid SFA.  Decision was made to   intervene with balloon angioplasty.  A 3 x 20 Otto balloon was brought onto   the field and used to cross the mid SFA stenosis.  This was inflated with   resolution of waist noted.  The 3 x20 Otto balloon was then brought into the   SFA stent and used to post-dilate the length of the SFA stent. A followup   angiogram demonstrated less than 20% stenosis of the mid SFA stenosis and again   with brisk antegrade flow through the proximal SFA and through the stent.    Attention was then turned to the patient's high-grade stenosis noted prior and   at the distal popliteal/proximal TP trunk.  The stenosis was greater than 95%.    Decision was made to intervene on this.  The 0.018 Glidewire Advantage was   advanced through the TP trunk and into the peroneal artery.  A 3 x 20 Otto   balloon was used to cross this lesion and the proximal TP trunk was inflated   with resolution of waist noted.  Followup angiogram demonstrated less than 20%   stenosis of the TP trunk with angiogram demonstrating a patent proximal AT as   well as the proximal PT and peroneal arteries.  Decision was made to conclude   the procedure.  The patient was given IV protamine.   Mynx device was deployed   under visualization with good hemostasis and no hematoma noted.  The patient was   taken to the PACU in stable condition.    DICTATED BY:  Orquidea Hernandez D.O. (RES)      BRITTNEY/LEIDA  dd: 02/21/2018 17:43:25 (CST)  td: 02/21/2018 20:36:39 (CST)  Doc ID   #[id number]  Job ID #[id number]    CC:     [id number]

## 2018-02-22 NOTE — ANESTHESIA POSTPROCEDURE EVALUATION
"Anesthesia Post Evaluation    Patient: Simona Bassett    Procedure(s) Performed: Procedure(s) (LRB):  Angiogram left leg (right CFA approach) (Left)    Final Anesthesia Type: general  Patient location during evaluation: PACU  Patient participation: Yes- Able to Participate  Level of consciousness: awake and alert  Post-procedure vital signs: reviewed and stable  Pain management: adequate  Airway patency: patent  PONV status at discharge: No PONV  Anesthetic complications: no      Cardiovascular status: blood pressure returned to baseline  Respiratory status: unassisted  Hydration status: euvolemic  Follow-up not needed.        Visit Vitals  BP (!) 97/58   Pulse 62   Temp 36.9 °C (98.5 °F) (Skin)   Resp 18   Ht 4' 11" (1.499 m)   Wt 37.6 kg (83 lb)   SpO2 97%   Breastfeeding? No   BMI 16.76 kg/m²       Pain/Alex Score: Pain Assessment Performed: Yes (2/21/2018  7:30 PM)  Presence of Pain: denies (2/21/2018  7:30 PM)  Alex Score: 9 (2/21/2018  4:00 PM)      "

## 2018-02-23 ENCOUNTER — TELEPHONE (OUTPATIENT)
Dept: WOUND CARE | Facility: CLINIC | Age: 83
End: 2018-02-23

## 2018-02-23 NOTE — TELEPHONE ENCOUNTER
----- Message from Aneudy Youngblood sent at 2/23/2018 10:16 AM CST -----  Contact: Camila- daughter  Caller said she needs to clarify orders. Caller said home health came out and said she was only ordered two times a week. Caller said she thought it was three times a week. Please call to clarify so that caller can make arrangements to be there. Camila can be reached at [phone]

## 2018-03-07 ENCOUNTER — HOSPITAL ENCOUNTER (OUTPATIENT)
Dept: RADIOLOGY | Facility: HOSPITAL | Age: 83
Discharge: HOME | End: 2018-03-07
Attending: NURSE PRACTITIONER
Payer: MEDICARE

## 2018-03-07 DIAGNOSIS — R93.89 ABNORMAL FINDINGS ON DIAGNOSTIC IMAGING OF OTHER SPECIFIED BODY STRUCTURES: ICD-10-CM

## 2018-03-07 PROCEDURE — 78805 NM INFLAMMATORY LOCALIZATION LTD INDIUM: CPT | Mod: 26,,, | Performed by: RADIOLOGY

## 2018-03-07 PROCEDURE — 78315 BONE IMAGING 3 PHASE: CPT | Mod: TC

## 2018-03-07 PROCEDURE — A9503 TC99M MEDRONATE: HCPCS

## 2018-03-07 PROCEDURE — 78315 BONE IMAGING 3 PHASE: CPT | Mod: 26,,, | Performed by: RADIOLOGY

## 2018-03-08 ENCOUNTER — HOSPITAL ENCOUNTER (OUTPATIENT)
Dept: RADIOLOGY | Facility: HOSPITAL | Age: 83
Discharge: HOME | End: 2018-03-08
Attending: NURSE PRACTITIONER
Payer: MEDICARE

## 2018-03-08 ENCOUNTER — TELEPHONE (OUTPATIENT)
Dept: WOUND CARE | Facility: CLINIC | Age: 83
End: 2018-03-08

## 2018-03-08 PROCEDURE — 78805 NM INFLAMMATORY LOCALIZATION LTD INDIUM: CPT | Mod: TC

## 2018-03-13 ENCOUNTER — OFFICE VISIT (OUTPATIENT)
Dept: VASCULAR SURGERY | Facility: CLINIC | Age: 83
End: 2018-03-13
Payer: MEDICARE

## 2018-03-13 ENCOUNTER — HOSPITAL ENCOUNTER (OUTPATIENT)
Dept: VASCULAR SURGERY | Facility: CLINIC | Age: 83
Discharge: HOME | End: 2018-03-13
Attending: SURGERY
Payer: MEDICARE

## 2018-03-13 VITALS
SYSTOLIC BLOOD PRESSURE: 138 MMHG | TEMPERATURE: 98 F | WEIGHT: 79 LBS | BODY MASS INDEX: 16.58 KG/M2 | HEIGHT: 58 IN | HEART RATE: 74 BPM | DIASTOLIC BLOOD PRESSURE: 66 MMHG

## 2018-03-13 DIAGNOSIS — I70.244 ATHEROSCLEROSIS OF NATIVE ARTERY OF LEFT LOWER EXTREMITY WITH ULCERATION OF HEEL: Primary | ICD-10-CM

## 2018-03-13 DIAGNOSIS — L98.499 ATHEROSCLEROTIC PVD WITH ULCERATION: ICD-10-CM

## 2018-03-13 DIAGNOSIS — I70.209 ATHEROSCLEROTIC PVD WITH ULCERATION: ICD-10-CM

## 2018-03-13 PROCEDURE — 93924 LWR XTR VASC STDY BILAT: CPT | Mod: 26,S$PBB,, | Performed by: SURGERY

## 2018-03-13 PROCEDURE — 99213 OFFICE O/P EST LOW 20 MIN: CPT | Mod: S$PBB,,, | Performed by: SURGERY

## 2018-03-13 PROCEDURE — 99213 OFFICE O/P EST LOW 20 MIN: CPT | Mod: PBBFAC | Performed by: SURGERY

## 2018-03-13 PROCEDURE — 99999 PR PBB SHADOW E&M-EST. PATIENT-LVL III: CPT | Mod: PBBFAC,,, | Performed by: SURGERY

## 2018-03-13 PROCEDURE — 93924 LWR XTR VASC STDY BILAT: CPT | Mod: PBBFAC | Performed by: SURGERY

## 2018-03-13 NOTE — PROGRESS NOTES
See my prior note; review of systems, family history and social history are   unchanged.    HISTORY OF PRESENT ILLNESS:  A 89-year-old female status post:  1.  Angioplasty, left tibioperoneal trunk and proximal SFA, 02/21/2018.    This is her initial followup visit.  This was performed for a poorly healing   left heel ulcer.  The daughter reports that healing has improved substantially   since this intervention.    PHYSICAL EXAMINATION:  VITAL SIGNS:  See nursing note.  ABDOMEN:  Soft, nontender.  EXTREMITIES:  Femoral pulses are 2+.  Left foot is bandaged and family wishes to   leave the bandage on.    IMAGING:  ABIs are 0.92 on the right and 0.91 on the left, compared with 1.56 on   the left pre-intervention.    ASSESSMENT:  Marked improvement in arterial flow, left foot.    Her arterial flow is now optimized.    RECOMMENDATIONS:  Keep followup appointments with Farzana Rao.          /mel [id number] erasmo(s)        LEYDI/LEIDA  dd: 03/13/2018 13:56:00 (CDT)  td: 03/14/2018 10:50:38 (CDT)  Doc ID   #[id number]  Job ID #[id number]    CC:

## 2018-03-21 ENCOUNTER — OFFICE VISIT (OUTPATIENT)
Dept: INFECTIOUS DISEASES | Facility: CLINIC | Age: 83
End: 2018-03-21
Payer: MEDICARE

## 2018-03-21 ENCOUNTER — TELEPHONE (OUTPATIENT)
Dept: WOUND CARE | Facility: CLINIC | Age: 83
End: 2018-03-21

## 2018-03-21 ENCOUNTER — OFFICE VISIT (OUTPATIENT)
Dept: WOUND CARE | Facility: CLINIC | Age: 83
End: 2018-03-21
Payer: MEDICARE

## 2018-03-21 VITALS
SYSTOLIC BLOOD PRESSURE: 102 MMHG | HEIGHT: 58 IN | DIASTOLIC BLOOD PRESSURE: 57 MMHG | BODY MASS INDEX: 16.77 KG/M2 | HEART RATE: 66 BPM | TEMPERATURE: 97 F | WEIGHT: 79.88 LBS

## 2018-03-21 DIAGNOSIS — L89.620 DECUBITUS ULCER OF LEFT HEEL, UNSTAGEABLE: ICD-10-CM

## 2018-03-21 DIAGNOSIS — M86.272 SUBACUTE OSTEOMYELITIS OF LEFT FOOT: Primary | ICD-10-CM

## 2018-03-21 DIAGNOSIS — L97.523 SKIN ULCER OF LEFT FOOT WITH NECROSIS OF MUSCLE: ICD-10-CM

## 2018-03-21 DIAGNOSIS — I70.244 ATHEROSCLEROSIS OF NATIVE ARTERY OF LEFT LOWER EXTREMITY WITH ULCERATION OF HEEL: ICD-10-CM

## 2018-03-21 DIAGNOSIS — L97.523 SKIN ULCER OF LEFT FOOT WITH NECROSIS OF MUSCLE: Primary | ICD-10-CM

## 2018-03-21 DIAGNOSIS — Z72.0 TOBACCO ABUSE: ICD-10-CM

## 2018-03-21 PROCEDURE — 99205 OFFICE O/P NEW HI 60 MIN: CPT | Mod: S$PBB,,, | Performed by: INTERNAL MEDICINE

## 2018-03-21 PROCEDURE — 99212 OFFICE O/P EST SF 10 MIN: CPT | Mod: PBBFAC,27 | Performed by: INTERNAL MEDICINE

## 2018-03-21 PROCEDURE — 99212 OFFICE O/P EST SF 10 MIN: CPT | Mod: S$PBB,,, | Performed by: NURSE PRACTITIONER

## 2018-03-21 PROCEDURE — 99215 OFFICE O/P EST HI 40 MIN: CPT | Mod: PBBFAC | Performed by: NURSE PRACTITIONER

## 2018-03-21 PROCEDURE — 99999 PR PBB SHADOW E&M-EST. PATIENT-LVL V: CPT | Mod: PBBFAC,,, | Performed by: NURSE PRACTITIONER

## 2018-03-21 PROCEDURE — 99999 PR PBB SHADOW E&M-EST. PATIENT-LVL II: CPT | Mod: PBBFAC,,, | Performed by: INTERNAL MEDICINE

## 2018-03-21 RX ORDER — CIPROFLOXACIN 500 MG/5ML
500 KIT ORAL DAILY
Qty: 300 ML | Refills: 0 | Status: SHIPPED | OUTPATIENT
Start: 2018-03-21 | End: 2018-05-05

## 2018-03-21 NOTE — PATIENT INSTRUCTIONS
Cleanse wounds with wound cleanser.  Santyl to left heel ulcers daily.  Hydrogel to left dorsal foot ulcer daily.  Cover wounds with gauze and secure with roll gauze.  Darco shoe left foot.

## 2018-03-21 NOTE — PROGRESS NOTES
Subjective:       Patient ID: Simona Bassett is a 89 y.o. female.    Chief Complaint: Wound Check    Wound Check        This patient is seen today for reevaluation of ulcers to the left dorsal foot and heel.  She broke the left foot in early October 2017 and was given some type of a boot to wear.  This rubbed and caused the wound on the top of the foot.  She then broke the leg in late October and this is when she developed an ulcer to the left heel.  She has home health through [facility] three times weekly.  She has been using santyl daily on both wounds and they are healing as evidenced by wound contracture.  She is afebrile.  She denies increased redness, swelling or purulent drainage.  Her pain level is 4/10.  She also has hypoalbuminemia secondary to protein calorie malnutrition. Her appetite is poor and she is drinking one to two boost daily.  She is also smoking 1/2 pack of cigarettes daily. She is seen with Dr. Cohen to coordinate care.  Review of Systems   Constitutional: Negative for chills, diaphoresis and fever.   HENT: Positive for hearing loss and trouble swallowing. Negative for postnasal drip, rhinorrhea, sinus pressure, sneezing, sore throat and tinnitus.    Eyes: Positive for visual disturbance.   Respiratory: Positive for cough and shortness of breath. Negative for apnea and wheezing.    Cardiovascular: Negative for chest pain, palpitations and leg swelling.   Gastrointestinal: Positive for constipation. Negative for diarrhea, nausea and vomiting.   Genitourinary: Negative for difficulty urinating, dysuria, frequency and hematuria.   Musculoskeletal: Positive for back pain. Negative for arthralgias and joint swelling.   Skin: Positive for wound.   Neurological: Negative for dizziness, weakness, light-headedness and headaches.   Hematological: Bruises/bleeds easily.   Psychiatric/Behavioral: Negative for confusion, decreased concentration, dysphoric mood and sleep disturbance. The patient is  nervous/anxious.        Objective:      Physical Exam   Constitutional: She is oriented to person, place, and time. She appears well-developed and well-nourished. No distress.   HENT:   Head: Normocephalic and atraumatic.   Musculoskeletal: She exhibits no edema or tenderness.        Thoracic back: She exhibits deformity (kyphosis).        Feet:    Neurological: She is alert and oriented to person, place, and time.   Skin: Skin is warm and dry. No rash noted. She is not diaphoretic. No erythema.   Psychiatric: She has a normal mood and affect. Her behavior is normal. Judgment and thought content normal.   Nursing note and vitals reviewed.        Lab Results   Component Value Date    ALBUMIN 2.7 (L) 02/07/2018     Procedure: Simona was seen in the clinic room and placed in the supine position on the treatment table. Attention was directed to the ulcer which was located on the left heel, where an wound measuring 2.1x2.5x0.3 cm is noted. The wound was covered with 100% fibrin and a mild callused rim. No acute signs of infection were noted. The wound was non-tender. The wound was prepped with alcohol. Utilizing a #15 scalpel, I debrided the wound full-thickness through skin and subcutaneous tissue to excise viable and nonviable tissue inside and outside the wound margins. The patient tolerated well. Because of a lack of sensation, anesthesia was not necessary. The wound was flushed with wound . There was minimal bleeding with debridement which was well controlled with direct pressure. The wound was then dressed with santyl. The patient tolerated the procedure well.  Assessment:       1. Skin ulcer of left foot with necrosis of muscle    2. Decubitus ulcer of left heel, unstageable    3. 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  Santyl (patient brought her own medication from home) to left heel ulcers daily.  Hydrogel  to left dorsal foot ulcer daily.  Cover wounds with gauze and secure with roll gauze.  Darco shoe left foot.  Return to clinic in 2 weeks.  [facility] notified of orders via EPIC fax.    Unstageable left heel decubitus      Stage IV left dorsal foot ulcer

## 2018-03-21 NOTE — TELEPHONE ENCOUNTER
----- Message from Mario Gilbert sent at 3/21/2018  4:04 PM CDT -----  Contact: Brandi//Daughter  Patient states that (s)he needs to speak with nurse in ref to some additional questions she has about the pts consult earlier today//please call back at [phone]//thank you

## 2018-03-22 NOTE — PROGRESS NOTES
Subjective:      Chief Complaint:  Left heel osteomyelitis    History of Present Illness  89-year-old female with current tobacco use and peripheral vascular disease presents for evaluation of left heal osteomyelitis.  Patient broke her left foot and leg in October 2017 - subsequently developed ulceration on dorsal surface of foot and on heel. She was found to have significant PVD, underwent PTA, L tibio-peroneal trunk (3x20) and stent placement, L SFA (5x60 Zilver) on 2/21/2018. She is also being managed by wound care. She has been applying santyl on both wounds daily with subsequent resolution of wounds. An indium scan was performed which showed L calcaneous osteomyelitis. Her inflammatory markers were also found to be elevated. Currently having 4/10 pain in heel. No redness, swelling, purulent drainage from heel ulcer. Patient continues to smoke 1/2 PPD, but poor appetite - drinks 2 boosts per day. Per daughter, patient has difficulty with taking pills. No fevers, chills, sweats.    Review of Systems   Constitutional: Negative for chills, diaphoresis, fever and weight loss.   HENT: Negative for congestion, sinus pain and sore throat.    Eyes: Negative for photophobia and pain.   Respiratory: Negative for cough, sputum production and shortness of breath.    Cardiovascular: Negative for chest pain and leg swelling.   Gastrointestinal: Negative for abdominal pain, diarrhea, nausea and vomiting.   Genitourinary: Negative for dysuria and hematuria.   Musculoskeletal: Negative for joint pain.   Skin: Negative for rash.   Neurological: Negative for focal weakness and headaches.   Psychiatric/Behavioral: Negative for depression. The patient is not nervous/anxious.        Objective:     Physical Exam   Constitutional: She is oriented to person, place, and time. No distress.   Frail, elderly female   HENT:   Head: Normocephalic and atraumatic.   Eyes: Conjunctivae and EOM are normal.   Neck: Normal range of motion. Neck  supple.   Cardiovascular: Normal rate.    Pulmonary/Chest: Effort normal. No respiratory distress.   Abdominal: Soft. She exhibits no distension.   Musculoskeletal: Normal range of motion. She exhibits no edema.   Neurological: She is alert and oriented to person, place, and time.   Skin: Skin is warm and dry. No rash noted. She is not diaphoretic. No erythema.   Left heel with 3 cm ulceration, clean base. No drainage. No surrounding redness, swelling   Psychiatric: Mood, memory, affect and judgment normal.     Significant labs and imaging reviewed:    Sed Rate 0 - 20 mm/Hr 109       CRP 0.0 - 8.2 mg/L 23.3           3/8/2018    Findings: The patient was administered 0.542 mCi of indium-111 labeled white blood cells intravenously. There is abnormal accumulation of white cells at the posterior left calcaneus.   Impression      Osteomyelitis posterior left calcaneus.       Assessment:   89-year-old female  - Osteomyelitis left calcaneus  - Left heel ulcer  - Peripheral vascular disease   - s/p PTA, L tibio-peroneal trunk (3x20) and stent placement, L SFA (5x60 Zilver) 2/21/2018  - Tobacco use    Ideally would obtain bone biopsy to guide antimicrobial thearpy, however, heel ulcer has been healing and concern bone biopsy would backtrack on significant progress that has been made thus far as ultimate goal of treating osteomyelitis would be to assist with wound healing. Will start treatment with PO antibiotics with broad coverage - if no significant improvement in wound heeling, will transition to IV antibiotics.    Plan:   - Start ciprofloxacin 500 mg qdaily  - Start doxycycline 100 mg PO BID  - Plan for 6 week course  - Will monitor CBC, CMP, ESR, CRP  - Follow-up with wound care in 4 weeks    Gina Cohen MD MPH  [facility]

## 2018-03-25 ENCOUNTER — NURSE TRIAGE (OUTPATIENT)
Dept: ADMINISTRATIVE | Facility: CLINIC | Age: 83
End: 2018-03-25

## 2018-03-25 NOTE — TELEPHONE ENCOUNTER
Caller stated that the patient was seen by EMS this morning for low blood sugar, hypotension and bradypnea. Currently takes Cipro and Doxycyline for wound infection. Has been sleeping more than she usually does and attributes to the new antibiotics started. Told her that I cannot tell her to stop taking medications. Informed that I would send a message to the provider's office for when they reopen tomorrow. Instructed to call back with any additional problems and/or concerns    Reason for Disposition   Caller has NON-URGENT medication question about med that PCP prescribed and triager unable to answer question    Protocols used: ST MEDICATION QUESTION CALL-A-AH

## 2018-03-26 ENCOUNTER — TELEPHONE (OUTPATIENT)
Dept: INFECTIOUS DISEASES | Facility: CLINIC | Age: 83
End: 2018-03-26

## 2018-03-26 NOTE — TELEPHONE ENCOUNTER
Daughter called - patient has been taking cipro in Am, then doxy with lunch and dinner.    Patient already with baseline poor appetite. Now she is having nausea and vomiting. Yesterday, she had not eaten breakfast - fell asleep, then passed out with slow breathing - EMS called, found to have low blood pressure and hypotension - resolved with fluids.    No bowel movement since Friday. No other complaints. Reportedly wound is healing well.    Main reason to treat osteomyelitis aggressively is to address underlying infection to assist with wound healing. Patient with healing wound now with santyl.  Appears antibiotics causing more side effects with likely minimal benefit.  Will hold antibiotics, repeat CBC, chem, ESR, CRP. Will monitor wound - if no improvement, will resume antibiotics. If wound heals, will continue to monitor off antibiotics.

## 2018-03-27 ENCOUNTER — TELEPHONE (OUTPATIENT)
Dept: INFECTIOUS DISEASES | Facility: CLINIC | Age: 83
End: 2018-03-27

## 2018-03-27 NOTE — TELEPHONE ENCOUNTER
----- Message from Rhiannon Del Toro MA sent at 3/26/2018  4:53 PM CDT -----  Please fax an order to [facility] for the following:     [facility] Fax [phone]     Draw on 3/30/2018   - CBC with diff   - CMP   -ESR   - CRP     Fax results to Gina Cohen at [phone]

## 2018-04-03 DIAGNOSIS — J44.9 CHRONIC OBSTRUCTIVE PULMONARY DISEASE, UNSPECIFIED COPD TYPE: Primary | ICD-10-CM

## 2018-04-06 ENCOUNTER — TELEPHONE (OUTPATIENT)
Dept: INFECTIOUS DISEASES | Facility: CLINIC | Age: 83
End: 2018-04-06

## 2018-04-06 NOTE — TELEPHONE ENCOUNTER
Discussed with Doug - son    Labs     3/30/2018    WBC 8.7  Hgb 11.7  Plt 344    Cr 0.9  CRP 7.5 (<5)  ESR 50 (<20)

## 2018-04-18 ENCOUNTER — OFFICE VISIT (OUTPATIENT)
Dept: INFECTIOUS DISEASES | Facility: CLINIC | Age: 83
End: 2018-04-18
Payer: MEDICARE

## 2018-04-18 ENCOUNTER — OFFICE VISIT (OUTPATIENT)
Dept: WOUND CARE | Facility: CLINIC | Age: 83
End: 2018-04-18
Payer: MEDICARE

## 2018-04-18 VITALS
WEIGHT: 78.38 LBS | TEMPERATURE: 97 F | SYSTOLIC BLOOD PRESSURE: 120 MMHG | HEART RATE: 89 BPM | DIASTOLIC BLOOD PRESSURE: 60 MMHG | BODY MASS INDEX: 16.45 KG/M2 | HEIGHT: 58 IN

## 2018-04-18 DIAGNOSIS — L89.620 DECUBITUS ULCER OF LEFT HEEL, UNSTAGEABLE: Primary | ICD-10-CM

## 2018-04-18 DIAGNOSIS — I70.244 ATHEROSCLEROSIS OF NATIVE ARTERY OF LEFT LOWER EXTREMITY WITH ULCERATION OF HEEL: ICD-10-CM

## 2018-04-18 PROBLEM — L97.523 SKIN ULCER OF LEFT FOOT WITH NECROSIS OF MUSCLE: Status: RESOLVED | Noted: 2018-02-07 | Resolved: 2018-04-18

## 2018-04-18 PROCEDURE — 99215 OFFICE O/P EST HI 40 MIN: CPT | Mod: PBBFAC,25 | Performed by: NURSE PRACTITIONER

## 2018-04-18 PROCEDURE — 99213 OFFICE O/P EST LOW 20 MIN: CPT | Mod: S$PBB,,, | Performed by: INTERNAL MEDICINE

## 2018-04-18 PROCEDURE — 99212 OFFICE O/P EST SF 10 MIN: CPT | Mod: 25,S$PBB,, | Performed by: NURSE PRACTITIONER

## 2018-04-18 PROCEDURE — 99999 PR PBB SHADOW E&M-EST. PATIENT-LVL I: CPT | Mod: PBBFAC,,, | Performed by: INTERNAL MEDICINE

## 2018-04-18 PROCEDURE — 11042 DBRDMT SUBQ TIS 1ST 20SQCM/<: CPT | Mod: S$PBB,,, | Performed by: NURSE PRACTITIONER

## 2018-04-18 PROCEDURE — 99211 OFF/OP EST MAY X REQ PHY/QHP: CPT | Mod: PBBFAC,27 | Performed by: INTERNAL MEDICINE

## 2018-04-18 PROCEDURE — 99999 PR PBB SHADOW E&M-EST. PATIENT-LVL V: CPT | Mod: PBBFAC,,, | Performed by: NURSE PRACTITIONER

## 2018-04-18 PROCEDURE — 11042 DBRDMT SUBQ TIS 1ST 20SQCM/<: CPT | Mod: PBBFAC | Performed by: NURSE PRACTITIONER

## 2018-04-18 NOTE — PATIENT INSTRUCTIONS
Cleanse wound with wound .  Apply santyl to left heel wound daily, cover with gauze and secure with roll gauze.    Home health will come out 3 times weekly and you will change the dressing on the alternate days.

## 2018-04-18 NOTE — PROGRESS NOTES
Subjective:       Patient ID: Simona Bassett is a 89 y.o. female.    Chief Complaint: Wound Check    Wound Check        This patient is seen today for reevaluation of ulcers to the left dorsal foot and heel.  She broke the left foot in early October 2017 and was given some type of a boot to wear.  This rubbed and caused the wound on the top of the foot.  She then broke the leg in late October and this is when she developed an ulcer to the left heel.  She has home health through [employer] three times weekly.  She has been using santyl daily on both wounds and they are healing as evidenced by wound contracture.  She is afebrile.  She denies increased redness, swelling or purulent drainage.  She has no pain.  She continues to lose weight.  Her daughter is considering home hospice for both of her parents.  She also has hypoalbuminemia secondary to protein calorie malnutrition. Her appetite is poor and she is drinking one to two boost daily.  She is also smoking 1/2 pack of cigarettes daily. She is seen with Dr. Cohen to coordinate care.  Review of Systems   Constitutional: Negative for chills, diaphoresis and fever.   HENT: Positive for hearing loss and trouble swallowing. Negative for postnasal drip, rhinorrhea, sinus pressure, sneezing, sore throat and tinnitus.    Eyes: Positive for visual disturbance.   Respiratory: Positive for cough and shortness of breath. Negative for apnea and wheezing.    Cardiovascular: Negative for chest pain, palpitations and leg swelling.   Gastrointestinal: Positive for constipation. Negative for diarrhea, nausea and vomiting.   Genitourinary: Negative for difficulty urinating, dysuria, frequency and hematuria.   Musculoskeletal: Positive for back pain. Negative for arthralgias and joint swelling.   Skin: Positive for wound.   Neurological: Negative for dizziness, weakness, light-headedness and headaches.   Hematological: Bruises/bleeds easily.   Psychiatric/Behavioral: Negative for  confusion, decreased concentration, dysphoric mood and sleep disturbance. The patient is nervous/anxious.        Objective:      Physical Exam   Constitutional: She is oriented to person, place, and time. She appears well-developed and well-nourished. No distress.   HENT:   Head: Normocephalic and atraumatic.   Musculoskeletal: She exhibits no edema or tenderness.        Thoracic back: She exhibits deformity (kyphosis).        Feet:    Neurological: She is alert and oriented to person, place, and time.   Skin: Skin is warm and dry. No rash noted. She is not diaphoretic. No erythema.   Psychiatric: She has a normal mood and affect. Her behavior is normal. Judgment and thought content normal.   Nursing note and vitals reviewed.        Lab Results   Component Value Date    ALBUMIN 2.7 (L) 02/07/2018     Procedure: Simona was seen in the clinic room and placed in the supine position on the treatment table. Attention was directed to the ulcer which was located on the left heel, where an wound measuring 0.6x1.6x0.2 cm is noted. The wound was covered with 100% fibrin and a mild callused rim. No acute signs of infection were noted. The wound was non-tender. The wound was prepped with alcohol. Utilizing a #15 scalpel, I debrided the wound full-thickness through skin and subcutaneous tissue to excise viable and nonviable tissue inside and outside the wound margins. The patient tolerated well. Because of a lack of sensation, anesthesia was not necessary. The wound was flushed with wound . There was minimal bleeding with debridement which was well controlled with direct pressure. The wound was then dressed with santyl. The patient tolerated the procedure well.  Assessment:       1. Decubitus ulcer of left heel, unstageable    2. 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  Santyl (patient brought her own medication from home)  to left heel ulcers daily.  Cover wound with gauze and secure with roll gauze.  Darco shoe left foot.  Return to clinic in 2-3 weeks.  [facility] notified of orders via EPIC fax.    Unstageable left heel decubitus          Stage IV left dorsal foot ulcer healed

## 2018-04-19 NOTE — PROGRESS NOTES
Subjective:      Chief Complaint: Follow-up for left heel osteomyelitis     History of Present Illness  89-year-old female with current tobacco use and peripheral vascular disease presents for evaluation of left heal osteomyelitis.      Summary of illness:  - Patient broke her left foot and leg in October 2017 - subsequently developed ulceration on dorsal surface of foot and on heel. She was found to have significant PVD, underwent PTA, L tibio-peroneal trunk (3x20) and stent placement, L SFA (5x60 Zilver) on 2/21/2018.    - An indium scan was performed which showed L calcaneous osteomyelitis.     Since last visit - patient tried liquid formulation of cipro and doxycycline but subsequently developed nausea and vomiting. She was found passed out - EMS called, found to have low blood pressure and hypotension - resolved with fluids. At that point, the decision was to discontinue antibiotics as risk appeared to outweigh any benefit as her heal ulcer was already improving with just local wound care.    Patient presents with daughter - left heel wound has continued to decrease in size. Dorsal foot ulcer has completely healed. Per daughter, plan to do pursue home hospice along with wound care at home.     Review of Systems   Unable to perform ROS: Dementia     Objective:      Physical Exam   Constitutional:  No distress.   Frail, elderly female   Cardiovascular: Normal rate.    Pulmonary/Chest: Effort normal. No respiratory distress.   Abdominal: Soft. She exhibits no distension.   Skin: Skin is warm and dry. No rash noted. She is not diaphoretic. No erythema.   Left heel with 1x2 cm ulceration, clean base. No drainage. No surrounding redness, swelling     Significant labs and imaging reviewed:   3/30/2018     WBC 8.7  Hgb 11.7  Plt 344     Cr 0.9  CRP 7.5 (<5)  ESR 50 (<20)       3/8/2018     Findings: The patient was administered 0.542 mCi of indium-111 labeled white blood cells intravenously. There is abnormal  accumulation of white cells at the posterior left calcaneus.   Impression       Osteomyelitis posterior left calcaneus.         Assessment:   89-year-old female  - Osteomyelitis left calcaneus  - Left heel ulcer, resolving  - Peripheral vascular disease   - s/p PTA, L tibio-peroneal trunk (3x20) and stent placement, L SFA (5x60 Zilver) 2/21/2018  - Tobacco use      Main reason to treat osteomyelitis aggressively is to address underlying infection to assist with wound healing. Patient with healing wound now with santyl.  Appears antibiotics causing more side effects with likely minimal benefit. As wound healing, will continue to monitor off antibiotics.       Plan:   - Continue to monitor heel off antibiotics  - Continue wound care     Gina Cohen MD MPH  [facility]

## 2018-05-06 DIAGNOSIS — M48.56XS NON-TRAUMATIC COMPRESSION FRACTURE OF THIRD LUMBAR VERTEBRA, SEQUELA: ICD-10-CM

## 2018-05-06 RX ORDER — TRAMADOL HYDROCHLORIDE 50 MG/1
TABLET ORAL
Qty: 90 TABLET | Refills: 0 | OUTPATIENT
Start: 2018-05-06

## 2018-05-06 NOTE — TELEPHONE ENCOUNTER
Tramadol declined - she should be getting through hospice now - let me know if that is not the case.  pls also inform pharmacy

## 2018-07-24 ENCOUNTER — DOCUMENTATION ONLY (OUTPATIENT)
Dept: INTERNAL MEDICINE | Facility: CLINIC | Age: 83
End: 2018-07-24

## 2018-12-19 ENCOUNTER — DOCUMENTATION ONLY (OUTPATIENT)
Dept: INTERNAL MEDICINE | Facility: CLINIC | Age: 83
End: 2018-12-19

## 2019-09-30 ENCOUNTER — PES CALL (OUTPATIENT)
Dept: ADMINISTRATIVE | Facility: CLINIC | Age: 84
End: 2019-09-30

## 2019-10-09 ENCOUNTER — PES CALL (OUTPATIENT)
Dept: ADMINISTRATIVE | Facility: CLINIC | Age: 84
End: 2019-10-09

## 2022-06-13 NOTE — ASSESSMENT & PLAN NOTE
JAVID SHANDRA Yanez, CECY     Pediatric Hematology Oncology   [facility]   Phone: [phone]  Pager: [phone]  *no letter*    · Patient admitted with hip fracture related to osteoporosis. Patient will need outpatient DEXA scan and further evaluation for additional treatment of osteoporosis. Will refer patient to [facility] on discharge for further evaluation and management of osteoporosis. Patient has scheduled appointment in Fracture Clinic for 11/30/2017 to further evaluate osteoporosis and treatment for osteoporosis.  · Continue Calcium and Vitamin D 1 tablet po BID to treat on discharge.  · Vitamin D level is adequate at 48 on this admit so no additional Vitamin D replacement needed.

## 2023-03-27 NOTE — TELEPHONE ENCOUNTER
reasonably well controlled and no significant medication side effects noted continue Crestor 40 mg nightly declines to add Zetia per Dr Paulino recommendation repeat labs in 6 months, labs reviewed Lipid, CMP, CBCD, TSH, FT4,   -Discussed low fat diet, limit fast food, goodies, breads and pastas if consuming several days a week,  limit alcohol consumption as this combined with statin can cause liver problems.  -Discussed weight reduction and exercise 30 minutes 5 days a week for total of 150 minutes weekly.  -Discussed CoQ10 as directed  -Discussed if any unusual muscle aching/pain to contact the office, discussed medication and risk of muscle pain/damage from Rhabdomyolysis.     ----- Message from Jen Artis sent at 1/25/2018  2:20 PM CST -----  Contact: daughter   Pt's daughter stated that [facility] is requesting updated wound care orders for pt fax: [phone] ATTN: Polly Newton  Paper work faxed over on 01/25/2018

## 2024-09-25 NOTE — ASSESSMENT & PLAN NOTE
Encounter for aftercare for healing closed traumatic fracture of left hip  Patient is POD # 1. Patient reports no pain in left hip. Plan is to start PT/OT for gait training and strengthening and restoration of ADL's. Patient is TTWB: left lower extremity as per Orthopedic recommendations. Plan is to start Lovenox 40 mg subcutaneous daily and will need a total of 4 weeks post-op after hip fracture surgery and LULÚ/SCDs for DVT prophylaxis. Orthopedics is following and managing hip fracture and surgical site. Perineural pain catheter in place for pain control and being managed by Anesthesia Pain Service. Skilled nursing consult placed as patient will likely need skilled care upon discharge from the hospital and  notified. Discontinue Bean to gravity.    15

## (undated) DEVICE — SHEATH ANSEL FLEXOR 5FRX45CM

## (undated) DEVICE — INTRODUCER VASC RADPQ 5FRX10CM

## (undated) DEVICE — SUT 0 VICRYL / CT-1

## (undated) DEVICE — Device

## (undated) DEVICE — DRESSING ABSRBNT ISLAND 3.6X8

## (undated) DEVICE — COVER INSTR ELASTIC BAND 40X20

## (undated) DEVICE — SUT D SPECIAL VICRYL 2-0

## (undated) DEVICE — DRESSING XEROFORM GAUZE 5X9

## (undated) DEVICE — TRAY MINOR ORTHO

## (undated) DEVICE — DRAPE C ARM 42 X 120 10/BX

## (undated) DEVICE — CATH ANGIO SOFT VU 5FR 65CM

## (undated) DEVICE — SPONGE LAP 18X18 PREWASHED

## (undated) DEVICE — SEE MEDLINE ITEM 157150

## (undated) DEVICE — DRAPE IOBAN 2 STERI

## (undated) DEVICE — DRESSING AQUACEL AG ADV 3.5X6

## (undated) DEVICE — SHEATH ANSEL FLEXOR 4FRX45CM

## (undated) DEVICE — GAUZE SPONGE 4X4 12PLY

## (undated) DEVICE — COVERS PROBE NR-48 STERILE

## (undated) DEVICE — WIRE GUIDE 3.2MM 400MM
Type: IMPLANTABLE DEVICE | Site: LEG | Status: NON-FUNCTIONAL
Removed: 2017-10-26

## (undated) DEVICE — TUBE FRAZIER 5MM 2FT SOFT TIP

## (undated) DEVICE — SUT ETHILON 3/0 18IN PS-1

## (undated) DEVICE — BLADE SURG CARBON STEEL #10

## (undated) DEVICE — DRAPE PLASTIC U 60X72

## (undated) DEVICE — DEVICE CLOSURE MYNX GRIP 5FR

## (undated) DEVICE — DRESSING TEGADERM IV 3.5 X 4.5

## (undated) DEVICE — DRAPE STERI U-SHAPED 47X51IN

## (undated) DEVICE — DRESSING GAUZE 6PLY 4X4

## (undated) DEVICE — SEE MEDLINE ITEM 152529

## (undated) DEVICE — SYS LABEL CORRECT MED

## (undated) DEVICE — KIT INTRODUCER W/GUIDEWIRE

## (undated) DEVICE — KIT INTRO MICRO NIT VSI 4FR

## (undated) DEVICE — STOPCOCK NDLS INJ MALE LL IV

## (undated) DEVICE — CATH OMNI FLUSH 4FR 65CM

## (undated) DEVICE — DRESSING N ADH OIL EMUL 3X8

## (undated) DEVICE — WIRE PTCA .014X300 CHOICE PTXS

## (undated) DEVICE — DRAPE C-ARMOR EQUIPMENT COVER

## (undated) DEVICE — SET DECANTER MEDICHOICE

## (undated) DEVICE — SOL 9P NACL IRR PIC IL

## (undated) DEVICE — SEE MEDLINE ITEM 146347

## (undated) DEVICE — APPLICATOR CHLORAPREP ORN 26ML

## (undated) DEVICE — DRESSING TRANS 4X4 TEGADERM

## (undated) DEVICE — SOL NS 1000CC

## (undated) DEVICE — DRESSING TRANS 6X8 TEGADERM

## (undated) DEVICE — CATH SEEKER .035IN 90CM

## (undated) DEVICE — SET MICROPUNCTURE

## (undated) DEVICE — SPONGE DERMACEA 4X4IN 12PLY

## (undated) DEVICE — INFLATOR ENCORE

## (undated) DEVICE — GUIDEWIRE ADVNTG 018X300CM ANG

## (undated) DEVICE — GUIDEWIRE STF .035X180CM ANG